# Patient Record
Sex: FEMALE | Race: WHITE
[De-identification: names, ages, dates, MRNs, and addresses within clinical notes are randomized per-mention and may not be internally consistent; named-entity substitution may affect disease eponyms.]

---

## 2017-07-15 ENCOUNTER — HOSPITAL ENCOUNTER (EMERGENCY)
Dept: HOSPITAL 11 - JP.ED | Age: 62
LOS: 1 days | Discharge: HOME | End: 2017-07-16
Payer: MEDICARE

## 2017-07-15 VITALS — SYSTOLIC BLOOD PRESSURE: 146 MMHG | DIASTOLIC BLOOD PRESSURE: 78 MMHG

## 2017-07-15 DIAGNOSIS — Z90.710: ICD-10-CM

## 2017-07-15 DIAGNOSIS — E66.9: ICD-10-CM

## 2017-07-15 DIAGNOSIS — F41.9: ICD-10-CM

## 2017-07-15 DIAGNOSIS — J44.9: ICD-10-CM

## 2017-07-15 DIAGNOSIS — F32.9: ICD-10-CM

## 2017-07-15 DIAGNOSIS — Z87.440: ICD-10-CM

## 2017-07-15 DIAGNOSIS — E03.9: ICD-10-CM

## 2017-07-15 DIAGNOSIS — Z88.8: ICD-10-CM

## 2017-07-15 DIAGNOSIS — R10.84: Primary | ICD-10-CM

## 2017-07-15 DIAGNOSIS — F17.210: ICD-10-CM

## 2017-07-15 DIAGNOSIS — Z88.1: ICD-10-CM

## 2017-07-15 DIAGNOSIS — Z90.721: ICD-10-CM

## 2017-07-15 DIAGNOSIS — Z79.899: ICD-10-CM

## 2017-07-15 DIAGNOSIS — Z90.49: ICD-10-CM

## 2017-07-15 DIAGNOSIS — Z98.890: ICD-10-CM

## 2017-07-15 DIAGNOSIS — Z88.5: ICD-10-CM

## 2017-07-16 NOTE — EDM.PDOC
ED HPI GENERAL MEDICAL PROBLEM





- General


Chief Complaint: Abdominal Pain


Stated Complaint: ABDOMINAL PAIN


Time Seen by Provider: 07/15/17 23:14


Source of Information: Reports: Patient, Family, RN Notes Reviewed


History Limitations: Reports: No Limitations





- History of Present Illness


INITIAL COMMENTS - FREE TEXT/NARRATIVE: 





61-year-old female presents emergency department day complaint of generalized 

abdominal pain, she does have a history of diverticular disease however the 

pain now has completely resolved she had no nausea vomiting or fever


  ** abdominal pain


Pain Score (Numeric/FACES): 3





- Related Data


 Allergies











Allergy/AdvReac Type Severity Reaction Status Date / Time


 


acetaminophen [From Percocet] Allergy  Hives Verified 07/15/17 23:49


 


ciprofloxacin [From Cipro] Allergy  Hives Verified 07/15/17 23:49


 


ciprofloxacin HCl Allergy  Hives Verified 07/15/17 23:49





[From Cipro]     


 


clonidine HCl [From Catapres] Allergy  Blisters Verified 07/15/17 23:49


 


hydromorphone Allergy  Itching Verified 07/15/17 23:49


 


oxycodone [From Percocet] Allergy  Hives Verified 07/15/17 23:49











Home Meds: 


 Home Meds





Citalopram [Citalopram HBr] 20 mg PO DAILY 01/29/15 [History]


Cyclobenzaprine [Flexeril] 10 - 20 mg PO TID PRN 01/29/15 [History]


Levothyroxine Sodium [Synthroid] 25 mcg PO DAILY 01/29/15 [History]


Pantoprazole [ProTONIX***] 40 mg PO BID 01/29/15 [History]


Albuterol Sulfate [Albuterol Sulfate HFA] 2 puff INH Q6H PRN 05/21/15 [History]


Ondansetron HCl [Zofran] 4 mg PO Q4H PRN 05/21/15 [History]


Tiotropium [Spiriva HandiHaler] 1 cap INH DAILY 16 [History]


ClonazePAM [KlonoPIN] 0.5 mg PO BID PRN #0 tablet 16 [Rx]


Loratadine 10 mg PO DAILY 16 [History]


Meclizine HCl 25 mg PO Q6HR PRN 16 [History]


traMADol [Ultram] 50 mg PO Q6HR PRN 16 [History]


buPROPion [Wellbutrin] 200 mg PO DAILY 09/10/16 [History]


Furosemide 40 mg PO DAILY PRN 17 [History]


Lidocaine 2% [Xylocaine 2% Jelly] 1 applic TOP ASDIRECTED 17 [History]


Mupirocin Oint [Bactroban Oint] 1 applic TP BID 17 [History]


Triamcinolone Acetonide [Kenalog 0.1% Crm] 1 applic TOP TID 17 [History]


Albuterol/Ipratropium [DuoNeb 3.0-0.5 MG/3 ML] 3 ml IH Q4H PRN 07/10/17 [History

]


predniSONE [Prednisone] 5 mg PO ASDIRECTED 07/10/17 [History]











Past Medical History


HEENT History: Reports: Allergic Rhinitis, Impaired Vision


Cardiovascular History: Reports: Cardiomyopathy, SOB on Exertion


Respiratory History: Reports: Bronchitis, Recurrent, COPD, Sleep Apnea


Gastrointestinal History: Reports: Bowel Obstruction, Diverticulosis, GERD, 

Hiatal Hernia


Genitourinary History: Reports: UTI, Recurrent, Other (See Below)


Other Genitourinary History: bladder suspension


OB/GYN History: Reports: Pregnancy, Spontaneous 


Musculoskeletal History: Reports: Arthritis, Back Pain, Chronic, Fibromyalgia


Neurological History: Reports: Vertigo


Psychiatric History: Reports: Anxiety, Depression


Endocrine/Metabolic History: Reports: Hypothyroidism, Obesity/BMI 30+


Oncologic (Cancer) History: Reports: Uterine


Other Oncologic History: complete hysterectomy 2015


Dermatologic History: Reports: Cellulitis


Other Dermatologic History: sore on right lower leg that won't go away





- Infectious Disease History


Infectious Disease History: Reports: Chicken Pox, Measles





- Past Surgical History


GI Surgical History: Reports: Appendectomy, Colonoscopy, EGD, Hernia Repair/

Other


Female  Surgical History: Reports: Hysterectomy, Salpingo-Oophorectomy





Social & Family History





- Family History


Family Medical History: Noncontributory





- Tobacco Use


Smoking Status *Q: Current Every Day Smoker


Years of Tobacco use: 40


Packs/Tins Daily: 0.5


Used Tobacco, but Quit: No


Month Tobacco Last Used: TODAY


Second Hand Smoke Exposure: No





- Caffeine Use


Caffeine Use: Reports: Coffee, Soda





- Alcohol Use


Days Per Week of Alcohol Use: 0





- Recreational Drug Use


Recreational Drug Use: No





ED ROS GENERAL





- Review of Systems


Review Of Systems: See Below


Constitutional: Reports: No Symptoms


Respiratory: Reports: No Symptoms


Cardiovascular: Reports: No Symptoms


GI/Abdominal: Reports: Abdominal Pain (Now resolved), Flatus.  Denies: Nausea, 

Vomiting ( headaches versus constipation)


: Reports: No Symptoms


Musculoskeletal: Reports: No Symptoms





ED EXAM, GI/ABD





- Physical Exam


Exam: See Below


Exam Limited By: No Limitations


General Appearance: Alert, WD/WN, No Apparent Distress


Respiratory/Chest: No Respiratory Distress


GI/Abdominal: Normal Bowel Sounds, Soft, Non-Tender, No Mass





Course





- Vital Signs


Last Recorded V/S: 





 Last Vital Signs











Temp  97.3 F   07/15/17 23:15


 


Pulse  83   07/15/17 23:15


 


Resp  16   07/15/17 23:15


 


BP  146/78 H  07/15/17 23:15


 


Pulse Ox  93 L  07/15/17 23:15














Departure





- Departure


Time of Disposition: 00:14


Disposition: Home, Self-Care 01


Condition: Good


Clinical Impression: 


Abdominal pain


Qualifiers:


 Abdominal location: generalized Qualified Code(s): R10.84 - Generalized 

abdominal pain








- Discharge Information


Forms:  ED Department Discharge


Additional Instructions: 


Continue regular medications,  Please followup with your primary care provider 

in 3-5 days if not better, please call return to the emergency department with 

worsening of symptoms.





- Assessment/Plan


Plan: 





Assessment





Acuity = acute





Site and laterality = abdominal pain now resolved





Etiology  = unclear etiology





Manifestations = none





Location of injury =  Home





Lab values = none





Plan


I offered to provide further workup and evaluation of abdominal pain she felt 

since the pain is now resolved she would prefer to just go home and will return 

if the she has a follow-up appointment with general surgery next month

















Patient was in agreement with the plan all questions were answered, they were 

instructed to return to the emergency department or call for worsening 

symptoms.  This note was dictated using dragon voice recognition software 

please call with any questions.

## 2017-10-03 ENCOUNTER — HOSPITAL ENCOUNTER (OUTPATIENT)
Dept: HOSPITAL 11 - JP.SDS | Age: 62
Discharge: HOME | End: 2017-10-03
Attending: SURGERY
Payer: MEDICARE

## 2017-10-03 VITALS — DIASTOLIC BLOOD PRESSURE: 72 MMHG | SYSTOLIC BLOOD PRESSURE: 107 MMHG

## 2017-10-03 DIAGNOSIS — F32.9: ICD-10-CM

## 2017-10-03 DIAGNOSIS — K44.9: ICD-10-CM

## 2017-10-03 DIAGNOSIS — F17.210: ICD-10-CM

## 2017-10-03 DIAGNOSIS — E03.9: ICD-10-CM

## 2017-10-03 DIAGNOSIS — K29.50: Primary | ICD-10-CM

## 2017-10-03 DIAGNOSIS — K21.9: ICD-10-CM

## 2017-10-03 DIAGNOSIS — J44.9: ICD-10-CM

## 2017-10-03 DIAGNOSIS — F41.9: ICD-10-CM

## 2017-10-03 DIAGNOSIS — G47.33: ICD-10-CM

## 2017-10-03 DIAGNOSIS — Z88.8: ICD-10-CM

## 2017-10-03 DIAGNOSIS — Z88.1: ICD-10-CM

## 2017-10-03 PROCEDURE — 43239 EGD BIOPSY SINGLE/MULTIPLE: CPT

## 2017-10-03 PROCEDURE — 88305 TISSUE EXAM BY PATHOLOGIST: CPT

## 2017-10-03 PROCEDURE — 88342 IMHCHEM/IMCYTCHM 1ST ANTB: CPT

## 2017-10-03 PROCEDURE — 88341 IMHCHEM/IMCYTCHM EA ADD ANTB: CPT

## 2017-10-03 PROCEDURE — 87081 CULTURE SCREEN ONLY: CPT

## 2017-10-10 NOTE — OR
DATE OF PROCEDURE:  10/03/2017

 

PREOPERATIVE DIAGNOSES:  Nausea and epigastric discomfort.

 

POSTOPERATIVE DIAGNOSES:  Nausea and epigastric discomfort associated with:

1. Small hiatal hernia with no gross inflammation at esophagogastric junction.

2. Diffuse gastritis (marked edema and mild redness throughout the gastric mucosa).

 

OPERATIVE PROCEDURE:  Esophagogastroduodenoscopy with:

1. Biopsies of antrum for CLOtest.

2. Biopsies of gastric body for histologic evaluation.

 

ANESTHESIA:  IV sedation.

 

INDICATIONS FOR PROCEDURE:  This is a 62-year-old presenting with ongoing problems with

nausea.  Nausea is predominant symptom, also has some mild epigastric discomfort as well.

Presently, she is on Protonix 40 mg b.i.d. and also taking Zofran on a p.r.n. basis.  The

plan is to proceed with an upper GI endoscopy with biopsies as indicated.  Potential risks

including bleeding and perforation were discussed, and the patient wishes to proceed.

 

DETAILS OF PROCEDURE:  The patient was taken to the operating room and placed in a left

lateral decubitus position.  IV sedation was administered, after which the upper GI

endoscope was passed orally through the length of the esophagus into the stomach with

retroflexion view of the fundus, and thereafter through the pyloric channel and into the

duodenum.

 

Findings included a normal hypopharynx, larynx, upper esophageal sphincter, there was a

small hiatal hernia present, but no gross inflammation or upward extension of the

gastroesophageal junction mucosal lining above the gastric fold.  Within the stomach, there

was a quite pronounced diffuse gastritis, this was most prominent within the body and

antrum.  The entire surface of the stomach was reddened and edematous.  No erosions or

ulcers were seen.  Pyloric channel and duodenum __________ third and fourth portions were

otherwise unremarkable.

 

The biopsies were obtained from the antrum and sent for CLOtest for H. pylori.  Multiple

biopsies were obtained from the gastric body, sent for histologic evaluation.  No bleeding

from the biopsy sites was seen and the procedure then concluded.

 

The plan will be to add a cytoprotective agent, in this case Carafate 1 gram q.i.d. to the

regimen and we will see her back in one month for recheck.  If the CLOtest is abnormal, we

will contact her regarding treatment of H. pylori.

 

 

 

 

Arian August MD

DD:  10/09/2017 09:07:38

DT:  10/10/2017 18:44:47

Job #:  [id number]

## 2018-04-16 ENCOUNTER — HOSPITAL ENCOUNTER (INPATIENT)
Dept: HOSPITAL 11 - JP.SDSSCHI | Age: 63
LOS: 2 days | Discharge: HOME | DRG: 351 | End: 2018-04-18
Attending: SURGERY | Admitting: SURGERY
Payer: MEDICARE

## 2018-04-16 DIAGNOSIS — K40.91: Primary | ICD-10-CM

## 2018-04-16 DIAGNOSIS — F17.210: ICD-10-CM

## 2018-04-16 DIAGNOSIS — N18.9: ICD-10-CM

## 2018-04-16 DIAGNOSIS — G57.82: ICD-10-CM

## 2018-04-16 DIAGNOSIS — K40.90: ICD-10-CM

## 2018-04-16 DIAGNOSIS — E78.00: ICD-10-CM

## 2018-04-16 DIAGNOSIS — G47.33: ICD-10-CM

## 2018-04-16 DIAGNOSIS — F60.9: ICD-10-CM

## 2018-04-16 DIAGNOSIS — G89.29: ICD-10-CM

## 2018-04-16 DIAGNOSIS — E55.9: ICD-10-CM

## 2018-04-16 DIAGNOSIS — J43.1: ICD-10-CM

## 2018-04-16 DIAGNOSIS — F32.9: ICD-10-CM

## 2018-04-16 DIAGNOSIS — F41.9: ICD-10-CM

## 2018-04-16 DIAGNOSIS — E03.9: ICD-10-CM

## 2018-04-16 DIAGNOSIS — I50.30: ICD-10-CM

## 2018-04-16 DIAGNOSIS — M54.9: ICD-10-CM

## 2018-04-16 DIAGNOSIS — K30: ICD-10-CM

## 2018-04-16 DIAGNOSIS — Z99.89: ICD-10-CM

## 2018-04-16 DIAGNOSIS — K43.0: ICD-10-CM

## 2018-04-16 PROCEDURE — C1781 MESH (IMPLANTABLE): HCPCS

## 2018-04-16 PROCEDURE — S0077 INJECTION, CLINDAMYCIN PHOSP: HCPCS

## 2018-04-16 PROCEDURE — 0YU60JZ SUPPLEMENT LEFT INGUINAL REGION WITH SYNTHETIC SUBSTITUTE, OPEN APPROACH: ICD-10-PCS | Performed by: SURGERY

## 2018-04-16 PROCEDURE — 01890ZZ DIVISION OF LUMBAR PLEXUS, OPEN APPROACH: ICD-10-PCS | Performed by: SURGERY

## 2018-04-16 PROCEDURE — 0WUF0JZ SUPPLEMENT ABDOMINAL WALL WITH SYNTHETIC SUBSTITUTE, OPEN APPROACH: ICD-10-PCS | Performed by: SURGERY

## 2018-04-16 RX ADMIN — BUPROPION HYDROCHLORIDE SCH MG: 150 TABLET, EXTENDED RELEASE ORAL at 21:50

## 2018-04-17 RX ADMIN — HYDROCODONE BITARTRATE AND ACETAMINOPHEN PRN TAB: 5; 325 TABLET ORAL at 08:58

## 2018-04-17 RX ADMIN — BUPROPION HYDROCHLORIDE SCH MG: 150 TABLET, EXTENDED RELEASE ORAL at 08:53

## 2018-04-17 RX ADMIN — HYDROCODONE BITARTRATE AND ACETAMINOPHEN PRN TAB: 5; 325 TABLET ORAL at 19:28

## 2018-04-17 RX ADMIN — BUPROPION HYDROCHLORIDE SCH MG: 150 TABLET, EXTENDED RELEASE ORAL at 21:04

## 2018-04-17 RX ADMIN — HYDROCODONE BITARTRATE AND ACETAMINOPHEN PRN TAB: 5; 325 TABLET ORAL at 15:05

## 2018-04-18 VITALS — DIASTOLIC BLOOD PRESSURE: 52 MMHG | SYSTOLIC BLOOD PRESSURE: 99 MMHG

## 2018-04-18 RX ADMIN — HYDROCODONE BITARTRATE AND ACETAMINOPHEN PRN TAB: 5; 325 TABLET ORAL at 08:20

## 2018-04-18 RX ADMIN — BUPROPION HYDROCHLORIDE SCH MG: 150 TABLET, EXTENDED RELEASE ORAL at 09:32

## 2018-04-19 NOTE — DISCH
ADMISSION DIAGNOSES:

1. Recurrent incarcerated incisional hernia.

2. Panlobular emphysema.

3. Chronic low back pain.

4. Obstructive sleep apnea syndrome with CPAP.

5. Mild concentric left ventricular hypertrophy.

6. Vitamin D deficiency.

7. Anxiety.

8. Gastrointestinal dysmotility.

9. Personality disorder.

10.Tobacco use disorder, recently stopped about a week ago.

11.Venous insufficiency.

12.Memory loss.

13.Major depression disorder.

14.Pure hypercholesterolemia.

15.Hypothyroidism due to acquired atrophy of the thyroid.

 

DISCHARGE DIAGNOSIS:  Open repair of recurrent incarcerated incisional hernia with mesh, and

repair of left inguinal hernia with mesh, and division of left inguinal nerve for recurrent

incarcerated incisional hernia and recurrent incarcerated left inguinal hernia and left

inguinal nerve entrapment.  Date of surgery on 04/16/2018 by Arian August MD.

 

HISTORY:    Bear Grey is a 62-year-old female with a recurrent incarcerated incisional

hernia and recurrent left inguinal hernia.  After preoperative evaluation and discussion of

possible risks and possible complications, she wished to proceed with surgical procedure.

 

HOSPITAL COURSE:  Bear had her surgery on 04/16/2018.  She had no operative complications.

On postop day #1, she was started on a regular diet, oral pain medication, and a stool

softener.  On postop day #2, her pain was well managed, her activity was good, vital signs

stable, and she was able to be discharged to home.

 

PHYSICAL EXAMINATION:

GENERAL:  Bear Grey is a 62-year-old female, alert, orientated.

VITAL SIGNS:  Height is 5 feet 1 inch, weight is 199 pounds.  TPR is 99.2, 61, 16, blood

pressure 99/52.

HEENT:  Negative.

NECK:  Supple.

HEART:  Regular rate and rhythm.

LUNGS:  Clear.

ABDOMEN:  Soft.  Normal expected tenderness on left surgical site.

EXTREMITIES:  Negative extremities without peripheral edema.

 

DISPOSITION:  Discharged to home.

 

CONDITION:  Stable and improving.

 

FOLLOWUP APPOINTMENT:  With Mariel Lugo PA-C, on 04/30/2018 at 9:00 a.m. and follow up

with Arian August MD on 05/09/2018 at 10:00 a.m.

 

DISCHARGE MEDICATIONS:  New prescriptions:

1. Norco 5/325 mg 1 to 2 every 4 hours p.r.n. pain, #40, new.

She is to resume her home medications:

1. Albuterol inhaler 2 puffs every 6 hours p.r.n. dyspnea.

2. Albuterol DuoNeb 3 mL every 4 hours p.r.n. shortness of breath.

3. Keflex 500 mg oral twice daily.

4. Klonopin 0.5 mg oral daily.

5. Furosemide 40 mg oral daily.

6. Levothyroxine 25 mcg oral daily.

7. Lidocaine 2% jelly one applicator topical as directed.

8. Loratadine 10 mg oral daily.

9. Magnesium oxide 500 mg oral daily.

10.Meclizine 25 mg oral q.6 hours p.r.n. dizziness.

11.Elocon 0.1% cream 1 applicator topical daily.

12.Zofran 4 mg every 4 hours p.r.n. nausea.

13.Protonix 40 mg oral twice daily.

14.Denavir 1% cream 1.5 g topical as directed.

15.Sertraline (Zoloft) 100 mg oral daily.

16.Carafate 1 tablet oral before meals and at bedtime.

17.Kenalog 0.1% cream topical t.i.d.

18.Chantix 1 tablet as directed b.i.d.

19.Bupropion 150 mg oral twice daily.

20.Lamotrigine 150 mg oral at bedtime.

21.Tizanidine 4 mg oral every 8 hours p.r.n. muscle spasms.

22.Trazodone 1 to 2 at bedtime for sleep.

 

DIET:  Usual diet as tolerated, drink 8-10 glasses of water a day.

 

ACTIVITY:  No lifting greater than 10 pounds for 6 weeks.

 

DRIVING AFTER DISCHARGE:  Do not drive on pain medication, may shower.

 

INSTRUCTIONS:  Notify provider if any fever, increased pain, nausea, or vomiting.  Keep site

clean and dry.

 

SPECIAL INSTRUCTION:  Use incentive spirometer 10 times every hour while awake.

## 2018-04-22 NOTE — OR
DATE OF PROCEDURE:  04/16/2018

 

PREOPERATIVE DIAGNOSIS:  Left inguinal hernia.

 

POSTOPERATIVE DIAGNOSES:

1. Recurrent incarcerated incisional hernia.

2. Non-incarcerated left inguinal hernia.

3. Left ilioinguinal nerve at risk for scar entrapment.

 

OPERATIVE PROCEDURES:

1. Open repair of recurrent incarcerated incisional hernia with mesh (61607, 49444).

2. Repair of left inguinal hernia with mesh (48084).

3. Division of left ilioinguinal nerve (77900).

 

ANESTHESIA:  General.

 

ASSISTANTS:

1. Mariel Lugo PA-C.

2. JUS Sierra.

3. JUS Kelsey.

 

INDICATION FOR PROCEDURE:  A 62-year-old presenting with what appears to be increasingly

symptomatic left inguinal hernia.  Plan is to proceed with an inguinal exploration with

repair of hernias as indicated.  Potential risks including bleeding, infection, injury to

underlying viscera, possible problems with the hernia recurring, problems of chronic pain

following the repair were all reviewed along with the remote possibility of cardiopulmonary,

septic, or hemorrhagic complications leading to death, and the patient wishes to proceed.

 

DETAILS OF PROCEDURE:  The patient was taken to the operating room and after general

endotracheal anesthesia was induced, the abdomen and groin areas were prepped and draped.

The area over the left inguinal region was then incised with incision then carried down

through the skin and subcutaneous tissue and down on to the area of the external oblique

aponeurosis.  This area was initially divided.  The patient had a complex hernia picture in

this case.  She had a previous incisional hernia located just above that, but that had been

repaired with mesh.  This appeared to have recurred along with some mesh prolapse in an

incarcerated manner into the abdominal wall just below the previous hernia repair site.

This was associated with the onset of a broad-based direct inguinal hernia as well.  At this

point, the incisional hernia component was dissected free from the surrounding soft tissues

and then reduced.  The previously-placed mesh was then affixed superior to that with some 0

Vicryl stitches between the musculature consisting of the 3 layers of the abdominal muscle

at that level.  This then involved division of the transversalis fascia and dissection in

the plane underneath the transversalis fascia down to the level of the Dell ligament as

well as immediately superiorly up to the level of the adjacent incisional hernia and then

laterally underneath the conjoined tendon.  An extra-large mesh plug was then placed into

that defect.  This was affixed initially to the Dell ligament with titanium tacking

screws, then medially to the underside of the conjoined tendon, superiorly to the conjoint

tendon, and also to the mesh at the repair site of the incisional hernia, all with 0

Ethibond sutures, and then finally with horizontal mattress sutures to the lateral aspect of

the tissue underlying the conjoined tendon.  At that point, there appeared to be a

reasonably secure closure of the floor as well as the above incisional hernia overlying

this.  The conjoined tendon was affixed to the inguinal ligament with a running #1 Vicryl

stitch.

 

The patient's ilioinguinal nerve was within the field of all of this dissection and it was

felt to be at significant risk for entrapment plus scar resulting in chronic pain.  Given

this, this was divided at the lateral aspect of the incision and specimen of this was

delivered from the field.

 

Finally, the external oblique aponeurosis was approximated over this area with 0 Vicryl

stitch, subcutaneous tissue with 4-0 Vicryl stitch, and the skin with staples.  Dressing was

applied.  The patient was taken to the recovery room in a satisfactory condition.  There

were no other complications.

 

Physician assistant, Mariel Lugo PA-C, played an essential role in assisting in this case,

helping to position the patient, retract structures as needed, as well as suturing and

cutting sutures when indicated.  Her presence improved patient safety and decreased the

operative time.

 

 

 

 

Arian August MD

DD:  04/21/2018 18:27:31

DT:  04/22/2018 12:57:37

Job #:  [id number]

## 2018-04-22 NOTE — PN
DATE OF SERVICE:  04/17/2018

 

The patient has been afebrile with stable vital signs.  The plan will be to start diet today

and go over to oral pain medication, and she may be ready for discharge home tomorrow.

 

 

 

 

Arian August MD

DD:  04/17/2018 06:38:23

DT:  04/22/2018 09:18:30

Job #:  [id number]

## 2018-07-03 ENCOUNTER — HOSPITAL ENCOUNTER (INPATIENT)
Dept: HOSPITAL 11 - JP.ED | Age: 63
LOS: 3 days | Discharge: HOME | DRG: 392 | End: 2018-07-06
Attending: INTERNAL MEDICINE | Admitting: INTERNAL MEDICINE
Payer: MEDICARE

## 2018-07-03 DIAGNOSIS — G89.29: ICD-10-CM

## 2018-07-03 DIAGNOSIS — K91.873: ICD-10-CM

## 2018-07-03 DIAGNOSIS — Z88.1: ICD-10-CM

## 2018-07-03 DIAGNOSIS — Z87.440: ICD-10-CM

## 2018-07-03 DIAGNOSIS — F32.9: ICD-10-CM

## 2018-07-03 DIAGNOSIS — E03.9: ICD-10-CM

## 2018-07-03 DIAGNOSIS — F41.9: ICD-10-CM

## 2018-07-03 DIAGNOSIS — K21.9: ICD-10-CM

## 2018-07-03 DIAGNOSIS — F17.210: ICD-10-CM

## 2018-07-03 DIAGNOSIS — H54.7: ICD-10-CM

## 2018-07-03 DIAGNOSIS — K57.32: Primary | ICD-10-CM

## 2018-07-03 DIAGNOSIS — Z85.42: ICD-10-CM

## 2018-07-03 DIAGNOSIS — R10.9: ICD-10-CM

## 2018-07-03 DIAGNOSIS — Z88.5: ICD-10-CM

## 2018-07-03 DIAGNOSIS — K57.30: ICD-10-CM

## 2018-07-03 DIAGNOSIS — M54.9: ICD-10-CM

## 2018-07-03 DIAGNOSIS — M79.7: ICD-10-CM

## 2018-07-03 DIAGNOSIS — J43.1: ICD-10-CM

## 2018-07-03 DIAGNOSIS — Z88.8: ICD-10-CM

## 2018-07-03 DIAGNOSIS — R07.9: ICD-10-CM

## 2018-07-03 DIAGNOSIS — J43.9: ICD-10-CM

## 2018-07-03 PROCEDURE — 36415 COLL VENOUS BLD VENIPUNCTURE: CPT

## 2018-07-03 PROCEDURE — C9113 INJ PANTOPRAZOLE SODIUM, VIA: HCPCS

## 2018-07-03 PROCEDURE — 96374 THER/PROPH/DIAG INJ IV PUSH: CPT

## 2018-07-03 PROCEDURE — 74019 RADEX ABDOMEN 2 VIEWS: CPT

## 2018-07-03 PROCEDURE — 74177 CT ABD & PELVIS W/CONTRAST: CPT

## 2018-07-03 PROCEDURE — 85025 COMPLETE CBC W/AUTO DIFF WBC: CPT

## 2018-07-03 PROCEDURE — 81001 URINALYSIS AUTO W/SCOPE: CPT

## 2018-07-03 PROCEDURE — 82150 ASSAY OF AMYLASE: CPT

## 2018-07-03 PROCEDURE — 96376 TX/PRO/DX INJ SAME DRUG ADON: CPT

## 2018-07-03 PROCEDURE — 99284 EMERGENCY DEPT VISIT MOD MDM: CPT

## 2018-07-03 PROCEDURE — 83690 ASSAY OF LIPASE: CPT

## 2018-07-03 PROCEDURE — 99285 EMERGENCY DEPT VISIT HI MDM: CPT

## 2018-07-03 PROCEDURE — 80053 COMPREHEN METABOLIC PANEL: CPT

## 2018-07-03 PROCEDURE — 96361 HYDRATE IV INFUSION ADD-ON: CPT

## 2018-07-03 RX ADMIN — HYDROCODONE BITARTRATE AND ACETAMINOPHEN PRN TAB: 5; 325 TABLET ORAL at 22:31

## 2018-07-03 RX ADMIN — IOPAMIDOL SCH ML: 612 INJECTION, SOLUTION INTRAVENOUS at 17:44

## 2018-07-03 RX ADMIN — TRIAMCINOLONE ACETONIDE SCH: 1 CREAM TOPICAL at 22:40

## 2018-07-03 RX ADMIN — IOPAMIDOL SCH: 612 INJECTION, SOLUTION INTRAVENOUS at 21:57

## 2018-07-03 RX ADMIN — METRONIDAZOLE SCH MLS/HR: 500 SOLUTION INTRAVENOUS at 22:24

## 2018-07-03 RX ADMIN — IOPAMIDOL SCH ML: 612 INJECTION, SOLUTION INTRAVENOUS at 17:43

## 2018-07-04 RX ADMIN — TAZOBACTAM SODIUM AND PIPERACILLIN SODIUM SCH MLS/HR: 375; 3 INJECTION, SOLUTION INTRAVENOUS at 16:02

## 2018-07-04 RX ADMIN — TAZOBACTAM SODIUM AND PIPERACILLIN SODIUM SCH MLS/HR: 375; 3 INJECTION, SOLUTION INTRAVENOUS at 21:17

## 2018-07-04 RX ADMIN — METRONIDAZOLE SCH MLS/HR: 500 SOLUTION INTRAVENOUS at 04:08

## 2018-07-04 RX ADMIN — TAZOBACTAM SODIUM AND PIPERACILLIN SODIUM SCH MLS/HR: 375; 3 INJECTION, SOLUTION INTRAVENOUS at 09:53

## 2018-07-04 RX ADMIN — DEXTROSE SCH MG: 50 INJECTION, SOLUTION INTRAVENOUS at 21:13

## 2018-07-04 RX ADMIN — TRIAMCINOLONE ACETONIDE SCH APPLIC: 1 CREAM TOPICAL at 08:32

## 2018-07-04 RX ADMIN — HYDROCODONE BITARTRATE AND ACETAMINOPHEN PRN TAB: 5; 325 TABLET ORAL at 14:43

## 2018-07-04 RX ADMIN — HYDROCODONE BITARTRATE AND ACETAMINOPHEN PRN TAB: 5; 325 TABLET ORAL at 08:44

## 2018-07-04 RX ADMIN — HYDROCODONE BITARTRATE AND ACETAMINOPHEN PRN TAB: 5; 325 TABLET ORAL at 04:29

## 2018-07-04 RX ADMIN — TRIAMCINOLONE ACETONIDE SCH APPLIC: 1 CREAM TOPICAL at 14:44

## 2018-07-04 RX ADMIN — HYDROCODONE BITARTRATE AND ACETAMINOPHEN PRN TAB: 5; 325 TABLET ORAL at 21:11

## 2018-07-04 RX ADMIN — TRIAMCINOLONE ACETONIDE SCH APPLIC: 1 CREAM TOPICAL at 21:04

## 2018-07-04 NOTE — PCM.PN
- General Info


Date of Service: 07/04/18


Functional Status: Reports: Pain Controlled





- Review of Systems


General: Denies: Fever


Gastrointestinal: Reports: Abdominal Pain


Systems Review Comment:: 





There were no acute events overnight. Abdominal pain has improved since 

admission. She still reports moderate lower abdominal and left lower quadrant 

abdominal pain. She has had a bowel movement. She has not had any fevers. White 

blood cell count is normal. No nausea. Appetite is returning.





- Patient Data


Vitals - Most Recent: 


 Last Vital Signs











Temp  35.7 C   07/04/18 08:21


 


Pulse  73   07/04/18 08:21


 


Resp  16   07/04/18 08:21


 


BP  97/54 L  07/04/18 08:21


 


Pulse Ox  90 L  07/04/18 08:21











Weight - Most Recent: 89.811 kg


I&O - Last 24 Hours: 


 Intake & Output











 07/03/18 07/04/18 07/04/18





 22:59 06:59 14:59


 


Intake Total  1001 140


 


Output Total  1150 300


 


Balance  -149 -160











Lab Results Last 24 Hours: 


 Laboratory Results - last 24 hr











  07/03/18 07/03/18 07/03/18 Range/Units





  15:11 15:11 15:11 


 


WBC   9.9   (4.5-11.0)  K/uL


 


RBC   4.44   (3.30-5.50)  M/uL


 


Hgb   13.1   (12.0-15.0)  g/dL


 


Hct   40.5   (36.0-48.0)  %


 


MCV   91   (80-98)  fL


 


MCH   30   (27-31)  pg


 


MCHC   32   (32-36)  %


 


Plt Count   253   (150-400)  K/uL


 


Neut % (Auto)   75 H   (36-66)  %


 


Lymph % (Auto)   18 L   (24-44)  %


 


Mono % (Auto)   6   (2-6)  %


 


Eos % (Auto)   1 L   (2-4)  %


 


Baso % (Auto)   0   (0-1)  %


 


Sodium    143  (140-148)  mmol/L


 


Potassium    3.8  (3.6-5.2)  mmol/L


 


Chloride    104  (100-108)  mmol/L


 


Carbon Dioxide    31  (21-32)  mmol/L


 


Anion Gap    8.3  (5.0-14.0)  mmol/L


 


BUN    14  (7-18)  mg/dL


 


Creatinine    1.0  D  (0.6-1.0)  mg/dL


 


Est Cr Clr Drug Dosing    44.58  mL/min


 


Estimated GFR (MDRD)    56 L  (>60)  


 


Glucose    101  ()  mg/dL


 


Calcium    8.9  (8.5-10.1)  mg/dL


 


Total Bilirubin    0.4  (0.2-1.0)  mg/dL


 


AST    17  (15-37)  U/L


 


ALT    22  (12-78)  U/L


 


Alkaline Phosphatase    98  ()  U/L


 


Total Protein    7.2  (6.4-8.2)  g/dL


 


Albumin    3.3 L  (3.4-5.0)  g/dL


 


Globulin    3.9 H  (2.3-3.5)  g/dL


 


Albumin/Globulin Ratio    0.9 L  (1.2-2.2)  


 


Amylase    52  ()  U/L


 


Lipase    71 L  ()  U/L


 


Urine Color  Yellow    


 


Urine Appearance  Clear    


 


Urine pH  7.0    (4.5-8.0)  


 


Ur Specific Gravity  1.010    (1.008-1.030)  


 


Urine Protein  Negative    (NEGATIVE)  mg/dL


 


Urine Glucose (UA)  Normal    (NEGATIVE)  mg/dL


 


Urine Ketones  Negative    (NEGATIVE)  mg/dL


 


Urine Occult Blood  Negative    (NEGATIVE)  


 


Urine Nitrite  Negative    (NEGATIVE)  


 


Urine Bilirubin  Negative    (NEGATIVE)  


 


Urine Urobilinogen  Normal    (NORMAL)  mg/dL


 


Ur Leukocyte Esterase  Negative    (NEGATIVE)  


 


Urine RBC  0-5    (0-5)  


 


Urine WBC  0-5    (0-5)  


 


Ur Epithelial Cells  Rare    


 


Amorphous Sediment  Not seen    


 


Urine Bacteria  Moderate    


 


Urine Mucus  Not seen    














  07/04/18 07/04/18 Range/Units





  05:45 05:45 


 


WBC  6.8   (4.5-11.0)  K/uL


 


RBC  4.11   (3.30-5.50)  M/uL


 


Hgb  12.4   (12.0-15.0)  g/dL


 


Hct  37.9   (36.0-48.0)  %


 


MCV  92   (80-98)  fL


 


MCH  30   (27-31)  pg


 


MCHC  33   (32-36)  %


 


Plt Count  212   (150-400)  K/uL


 


Neut % (Auto)  92 H   (36-66)  %


 


Lymph % (Auto)  7 L   (24-44)  %


 


Mono % (Auto)  1 L   (2-6)  %


 


Eos % (Auto)  0 L   (2-4)  %


 


Baso % (Auto)  0   (0-1)  %


 


Sodium   143  (140-148)  mmol/L


 


Potassium   4.1  (3.6-5.2)  mmol/L


 


Chloride   106  (100-108)  mmol/L


 


Carbon Dioxide   26  (21-32)  mmol/L


 


Anion Gap   10.7  (5.0-14.0)  mmol/L


 


BUN   11  (7-18)  mg/dL


 


Creatinine   1.0  (0.6-1.0)  mg/dL


 


Est Cr Clr Drug Dosing   44.58  mL/min


 


Estimated GFR (MDRD)   56 L  (>60)  


 


Glucose   154 H  ()  mg/dL


 


Calcium   8.4 L  (8.5-10.1)  mg/dL


 


Total Bilirubin    (0.2-1.0)  mg/dL


 


AST    (15-37)  U/L


 


ALT    (12-78)  U/L


 


Alkaline Phosphatase    ()  U/L


 


Total Protein    (6.4-8.2)  g/dL


 


Albumin    (3.4-5.0)  g/dL


 


Globulin    (2.3-3.5)  g/dL


 


Albumin/Globulin Ratio    (1.2-2.2)  


 


Amylase    ()  U/L


 


Lipase    ()  U/L


 


Urine Color    


 


Urine Appearance    


 


Urine pH    (4.5-8.0)  


 


Ur Specific Gravity    (1.008-1.030)  


 


Urine Protein    (NEGATIVE)  mg/dL


 


Urine Glucose (UA)    (NEGATIVE)  mg/dL


 


Urine Ketones    (NEGATIVE)  mg/dL


 


Urine Occult Blood    (NEGATIVE)  


 


Urine Nitrite    (NEGATIVE)  


 


Urine Bilirubin    (NEGATIVE)  


 


Urine Urobilinogen    (NORMAL)  mg/dL


 


Ur Leukocyte Esterase    (NEGATIVE)  


 


Urine RBC    (0-5)  


 


Urine WBC    (0-5)  


 


Ur Epithelial Cells    


 


Amorphous Sediment    


 


Urine Bacteria    


 


Urine Mucus    











Med Orders - Current: 


 Current Medications





Acetaminophen (Tylenol)  650 mg PO Q4H PRN


   PRN Reason: Pain (Mild 1-3)/fever


Hydrocodone Bitart/Acetaminophen (Norco 325-5 Mg)  1 tab PO Q4H PRN


   PRN Reason: Pain (moderate 4-6)


   Last Admin: 07/04/18 08:44 Dose:  1 tab


Albuterol (Proventil Neb Soln)  2.5 mg NEB Q4H PRN


   PRN Reason: Shortness Of Breath/wheezing


Albuterol (Ventolin Hfa)  0 gm INH Q6H PRN


   PRN Reason: Dyspnea


Albuterol/Ipratropium (Duoneb 3.0-0.5 Mg/3 Ml)  3 ml NEB Q4H PRN


   PRN Reason: Shortness of Breath


Albuterol/Ipratropium (Duoneb 3.0-0.5 Mg/3 Ml)  3 ml NEB QIDRT [facility]


Bisacodyl (Dulcolax)  5 mg PO DAILY PRN


   PRN Reason: Constipation


   Last Admin: 07/03/18 22:31 Dose:  5 mg


Bupropion HCl (Wellbutrin)  150 mg PO BID [facility]


   Last Admin: 07/04/18 08:31 Dose:  150 mg


Clonazepam (Klonopin)  0.5 mg PO DAILY [facility]


   Last Admin: 07/04/18 10:01 Dose:  0.5 mg


Docusate Sodium (Colace)  100 mg PO BID PRN


   PRN Reason: Constipation


   Last Admin: 07/03/18 22:31 Dose:  100 mg


Furosemide (Lasix)  40 mg PO DAILY PRN


   PRN Reason: Other


   Last Admin: 07/03/18 22:32 Dose:  40 mg


Piperacillin/Tazobactam/ (Dextrose 3.375 gm/ Premix)  50 mls @ 100 mls/hr IV 

Q6H [facility]


   Last Admin: 07/04/18 09:53 Dose:  100 mls/hr


Dextrose/Lactated Ringer's (Dextrose 5%-Lactated Ringers)  1,000 mls @ 100 mls/

hr IV ASDIRECTED [facility]


Lamotrigine (Lamotrigine)  150 mg PO BEDTIME [facility]


   Last Admin: 07/03/18 22:32 Dose:  150 mg


Levothyroxine Sodium (Levothyroxine)  25 mcg PO ACBREAKFAST [facility]


   Last Admin: 07/04/18 08:30 Dose:  25 mcg


Loratadine (Claritin)  10 mg PO DAILY [facility]


   Last Admin: 07/04/18 08:31 Dose:  10 mg


Lorazepam (Ativan)  1 mg IV Q6H PRN


   PRN Reason: Nausea/Vomiting


Morphine Sulfate (Morphine)  2 mg IVPUSH Q2H PRN


   PRN Reason: Pain (severe 7-10)


Nicotine (Habitrol)  14 mg TRDERM DAILY [facility]


   Last Admin: 07/04/18 08:31 Dose:  Not Given


Ondansetron HCl (Zofran Odt)  4 mg PO Q6H PRN


   PRN Reason: Nausea able to take PO


Ondansetron HCl (Zofran)  4 mg IV Q4H PRN


   PRN Reason: Nausea/Vomiting


Pantoprazole Sodium (Protonix Iv***)  40 mg IV DAILY [facility]


   Last Admin: 07/04/18 08:32 Dose:  40 mg


Sertraline HCl (Zoloft)  50 mg PO DAILY [facility]


   Last Admin: 07/04/18 08:31 Dose:  50 mg


Sucralfate (Carafate)  1 gm PO QIDACANDBED [facility]


   Last Admin: 07/04/18 08:31 Dose:  1 gm


Tizanidine HCl (Zanaflex)  4 mg PO Q8H PRN


   PRN Reason: Muscle Spasm


Trazodone HCl (Trazodone)  50 mg PO BEDTIME PRN


   PRN Reason: Sleep


   Last Admin: 07/03/18 22:31 Dose:  50 mg


Triamcinolone Acetonide (Triamcinolone Acetonide 0.1% Crm)  0 gm TOP TID [facility]


   Last Admin: 07/04/18 08:32 Dose:  1 applic





Discontinued Medications





Albuterol/Ipratropium (Duoneb 3.0-0.5 Mg/3 Ml)  3 ml NEB QID [facility]


   Last Admin: 07/04/18 05:28 Dose:  3 ml


Bupropion HCl (Wellbutrin)  150 mg PO BID [facility]


   Last Admin: 07/03/18 22:45 Dose:  150 mg


Sodium Chloride (Normal Saline)  1,000 mls @ 75 mls/hr IV ASDIRECTED [facility]


   Last Admin: 07/03/18 15:57 Dose:  75 mls/hr


Sodium Chloride (Normal Saline)  80 mls @ 3 mls/sec IV ASDIRECTED [facility]


   Stop: 07/03/18 23:00


   Last Admin: 07/03/18 17:44 Dose:  3 mls/sec


Sodium Chloride (Normal Saline)  1,000 mls @ 75 mls/hr IV ASDIRECTED [facility]


   Last Admin: 07/03/18 21:00 Dose:  75 mls/hr


Metronidazole 500 mg/ Premix  100 mls @ 100 mls/hr IV Q6H [facility]


   Last Admin: 07/04/18 04:08 Dose:  100 mls/hr


Piperacillin Sod/Tazobactam (Sod 3.375 gm/ Sodium Chloride)  50 mls @ 100 mls/

hr IV Q6H [facility]


   Last Admin: 07/04/18 04:08 Dose:  100 mls/hr


Sodium Chloride (Normal Saline)  1,000 mls @ 125 mls/hr IV ASDIRECTED [facility]


   Last Admin: 07/04/18 08:21 Dose:  125 mls/hr


Metronidazole 500 mg/ Premix  100 mls @ 100 mls/hr IV Q6H [facility]


Iopamidol (Isovue-300 (61%))  135 ml IV .AS DIRECTED [facility]


   Stop: 07/03/18 22:00


   Last Admin: 07/03/18 21:57 Dose:  Not Given


Levothyroxine Sodium (Levothyroxine)  25 mcg PO DAILY [facility]


Methylprednisolone Sodium Succinate (Solu-Medrol)  125 mg IVPUSH ONETIME ONE


   Stop: 07/03/18 21:28


   Last Admin: 07/03/18 22:35 Dose:  125 mg


Methylprednisolone Sodium Succinate (Solu-Medrol)  62.5 mg IVPUSH Q6H [facility]


   Last Admin: 07/04/18 04:08 Dose:  62.5 mg


Methylprednisolone Sodium Succinate (Solu-Medrol)  62.5 mg IVPUSH Q6H [facility]


   Last Admin: 07/04/18 09:51 Dose:  62.5 mg


Morphine Sulfate (Morphine)  5 mg IVPUSH ONETIME ONE


   Stop: 07/03/18 15:19


   Last Admin: 07/03/18 15:56 Dose:  5 mg


Morphine Sulfate (Morphine)  2 mg IVPUSH ONETIME ONE


   Stop: 07/03/18 17:13


   Last Admin: 07/03/18 17:30 Dose:  2 mg


Sodium Chloride (Saline Flush)  10 ml FLUSH ASDIRECTED PRN


   PRN Reason: Keep Vein Open


   Last Admin: 07/03/18 15:57 Dose:  10 ml


Sodium Chloride (Saline Flush)  10 ml FLUSH ONETIME ONE


   Stop: 07/03/18 17:21


   Last Admin: 07/03/18 17:40 Dose:  10 ml











- Exam


Quality Assessment: No: Supplemental Oxygen


General: Alert, Oriented, Cooperative, No Acute Distress


Neck: Supple


Lungs: Clear to Auscultation, Normal Respiratory Effort


GI/Abdominal Exam: Soft, No Distention, Tender, Abnormal Bowel Sounds (

hypoactive)


Extremities: No Pedal Edema


Skin: Warm, Dry


Psy/Mental Status: Alert, Normal Affect





- Problem List Review


Problem List Initiated/Reviewed/Updated: Yes





- My Orders


Last 24 Hours: 


My Active Orders





07/04/18 22:00


methylPREDNISolone Sod Succ [Solu-MEDROL]   62.5 mg IVPUSH Q12H 





07/04/18 Lunch


Clear Liquid Diet [DIET] 














- Plan


Plan:: 








ASSESSMENT / PLAN





Acute diverticulitis of the mid sigmoid colon - pain improving. Not having any 

fevers.Indication for surgical intervention at this time.


-IV fluids


-Antibiotic coverage with Piperacillin/tazobactam 3.375mg  IV every 6 hours


-Tapering dose of steroids


-Pain control


-Trial of clear liquids





COPD - stable with no evidence for exacerbation.


 -albuterol nebulizer every 4 hours as needed for wheezing and cough


-Duo nebu ; schedule  nebulize every 6 hours     


-oxygen per nasal cannula to keep sats greater than 92%              


-Advise to notify nurses of any chest pain or other symptoms 





Tobacco dependence - patient currently utilizing a nicotine patch.





Maintenance issues


-Nutrition: Clear liquids


-Pablo catheter - not indicated at this time


-DVT: SCD


-PPI: PPI





Disposition - I would anticipate discharge to home after the hospital stay, 

possibly tomorrow if she continues to improve at this rate





Phil Nelson M.D.

## 2018-07-05 RX ADMIN — TAZOBACTAM SODIUM AND PIPERACILLIN SODIUM SCH MLS/HR: 375; 3 INJECTION, SOLUTION INTRAVENOUS at 15:59

## 2018-07-05 RX ADMIN — TAZOBACTAM SODIUM AND PIPERACILLIN SODIUM SCH MLS/HR: 375; 3 INJECTION, SOLUTION INTRAVENOUS at 09:18

## 2018-07-05 RX ADMIN — TRIAMCINOLONE ACETONIDE SCH: 1 CREAM TOPICAL at 09:32

## 2018-07-05 RX ADMIN — TRIAMCINOLONE ACETONIDE SCH: 1 CREAM TOPICAL at 21:56

## 2018-07-05 RX ADMIN — TRIAMCINOLONE ACETONIDE SCH: 1 CREAM TOPICAL at 15:12

## 2018-07-05 RX ADMIN — TAZOBACTAM SODIUM AND PIPERACILLIN SODIUM SCH MLS/HR: 375; 3 INJECTION, SOLUTION INTRAVENOUS at 03:45

## 2018-07-05 RX ADMIN — DEXTROSE SCH MG: 50 INJECTION, SOLUTION INTRAVENOUS at 10:31

## 2018-07-05 RX ADMIN — TAZOBACTAM SODIUM AND PIPERACILLIN SODIUM SCH MLS/HR: 375; 3 INJECTION, SOLUTION INTRAVENOUS at 21:50

## 2018-07-05 RX ADMIN — HYDROCODONE BITARTRATE AND ACETAMINOPHEN PRN TAB: 5; 325 TABLET ORAL at 16:51

## 2018-07-05 NOTE — CR
Abdomen 2V AP Flat Upright

 

INDICATION:  abdominal pain

 

COMPARISON:   CT abdomen 4/12/2018

 

FINDINGS:  4 views.

There is infiltrate and possible pleural effusion at the left base.

Postoperative changes in the abdomen. No abnormal bowel gas pattern seen. No free air. No signs of ob
struction. No abnormal calculi seen.

## 2018-07-05 NOTE — PN
DATE OF SERVICE:  07/04/2018

 

The patient was admitted overnight with a sigmoid colon diverticulitis.  She states that her

abdomen feels a little bit less uncomfortable today.  Examination does show quite a bit in

the way of tenderness in the left lower quadrant.  A CT scan shows some fluid around the

area of diverticulitis, but no obvious abscess formation.  She is presently receiving

antibiotics consisting of Flagyl and Zosyn.  The plan, at this point, we would attempt to

treat this nonoperatively.  We will continue the antibiotics and minimize the amount of oral

intake, recheck some labs in the morning, and follow things clinically over the next few

days.

 

 

 

 

Arian August MD

DD:  07/04/2018 09:18:13

DT:  07/05/2018 08:15:47

Job #:  [id number]

## 2018-07-05 NOTE — PN
DATE OF SERVICE:  07/05/2018

 

SUBJECTIVE:  Bear has sigmoid diverticulitis.  She is on IV Zosyn and clear liquids.  BNP

was 2610.  She has been active.  Pain has been controlled.  Denies any other associated

signs and symptoms.

 

REVIEW OF SYSTEMS:  HEENT:  Negative.

NECK:  Negative.

HEART:  No chest pain or shortness of breath.

LUNGS:  No cough.

ABDOMEN:  As above.  Last bowel movement was a couple of days ago.

:  Negative for any UTI signs and symptoms.

EXTREMITIES:  Without joint pain or swelling.

SKIN:  Denies any rash or changes in moles.

PSYCHIATRIC:  No depression, anxiety, or insomnia.

 

Remainder of review of systems negative for any pertinent positives and negatives.

 

OBJECTIVE:  GENERAL:  Bear Grey is a 62-year-old female.  She is alert and orientated.

VITAL SIGNS:  TPR 96.3, 73, 18, and blood pressure 132/77.

HEENT:  Negative.

NECK:  Supple.

HEART:  Regular rate and rhythm.

LUNGS:  Clear.

ABDOMEN:  Soft and nontender.

EXTREMITIES:  Without peripheral edema.

NEUROLOGIC:  Intact.

PSYCHIATRIC:  Mood and affect appropriate.

 

ASSESSMENT:  Sigmoid diverticulitis.

 

PLAN:

1. Full liquid diet.

2. We will evaluate p.r.n. or in a.m.

 

 

 

 

Mariel Lugo PA-C

DD:  07/05/2018 08:30:42

DT:  07/05/2018 09:44:27

Job #:  [id number]

## 2018-07-05 NOTE — PCM.PN
- General Info


Date of Service: 07/05/18


Functional Status: Reports: Pain Controlled, Tolerating Diet





- Review of Systems


General: Denies: Fever


Gastrointestinal: Reports: Abdominal Pain


Systems Review Comment:: 





No acute events overnight. Abdominal pain has been slowly improving but has not 

resolved. She has been having bowel movements. She has not been having fevers. 

Shortness of breath is at baseline. Tolerating her clear liquid diet overnight.





- Patient Data


Vitals - Most Recent: 


 Last Vital Signs











Temp  36.8 C   07/05/18 10:54


 


Pulse  72   07/05/18 11:46


 


Resp  18   07/05/18 10:54


 


BP  125/63   07/05/18 10:54


 


Pulse Ox  96   07/05/18 11:46











Weight - Most Recent: 89.074 kg


I&O - Last 24 Hours: 


 Intake & Output











 07/04/18 07/05/18 07/05/18





 22:59 06:59 14:59


 


Intake Total 1270 1970 


 


Output Total 900 1800 700


 


Balance 370 170 -700











Lab Results Last 24 Hours: 


 Laboratory Results - last 24 hr











  07/05/18 07/05/18 Range/Units





  04:48 04:48 


 


WBC  9.2   (4.5-11.0)  K/uL


 


RBC  3.65   (3.30-5.50)  M/uL


 


Hgb  10.9 L   (12.0-15.0)  g/dL


 


Hct  33.7 L   (36.0-48.0)  %


 


MCV  92   (80-98)  fL


 


MCH  30   (27-31)  pg


 


MCHC  32   (32-36)  %


 


Plt Count  213   (150-400)  K/uL


 


Sodium   144  (140-148)  mmol/L


 


Potassium   3.5 L  (3.6-5.2)  mmol/L


 


Chloride   109 H  (100-108)  mmol/L


 


Carbon Dioxide   28  (21-32)  mmol/L


 


Anion Gap   10.5  (5.0-14.0)  mmol/L


 


BUN   11  (7-18)  mg/dL


 


Creatinine   0.9  (0.6-1.0)  mg/dL


 


Est Cr Clr Drug Dosing   49.53  mL/min


 


Estimated GFR (MDRD)   > 60  (>60)  


 


Glucose   175 H  ()  mg/dL


 


Calcium   8.5  (8.5-10.1)  mg/dL


 


Phosphorus   2.7  (2.5-4.9)  mg/dL


 


Magnesium   2.1  (1.8-2.4)  mg/dL


 


Total Bilirubin   0.2  (0.2-1.0)  mg/dL


 


AST   13 L  (15-37)  U/L


 


ALT   19  (12-78)  U/L


 


Alkaline Phosphatase   72  ()  U/L


 


NT-Pro-B Natriuret Pep   2610 H  (5-125)  pg/mL


 


Total Protein   6.3 L  (6.4-8.2)  g/dL


 


Albumin   2.8 L  (3.4-5.0)  g/dL


 


Globulin   3.5  (2.3-3.5)  g/dL


 


Albumin/Globulin Ratio   0.8 L  (1.2-2.2)  











Med Orders - Current: 


 Current Medications





Acetaminophen (Tylenol)  650 mg PO Q4H PRN


   PRN Reason: Pain (Mild 1-3)/fever


   Last Admin: 07/04/18 21:10 Dose:  650 mg


Hydrocodone Bitart/Acetaminophen (Norco 325-5 Mg)  1 tab PO Q4H PRN


   PRN Reason: Pain (moderate 4-6)


   Last Admin: 07/04/18 21:11 Dose:  1 tab


Albuterol (Proventil Neb Soln)  2.5 mg NEB Q4H PRN


   PRN Reason: Shortness Of Breath/wheezing


Albuterol (Ventolin Hfa)  0 gm INH Q6H PRN


   PRN Reason: Dyspnea


Albuterol/Ipratropium (Duoneb 3.0-0.5 Mg/3 Ml)  3 ml NEB Q4H PRN


   PRN Reason: Shortness of Breath


Albuterol/Ipratropium (Duoneb 3.0-0.5 Mg/3 Ml)  3 ml NEB QIDRT [facility]


   Last Admin: 07/05/18 11:46 Dose:  3 ml


Bisacodyl (Dulcolax)  5 mg PO DAILY PRN


   PRN Reason: Constipation


   Last Admin: 07/03/18 22:31 Dose:  5 mg


Bupropion HCl (Wellbutrin)  150 mg PO BID [facility]


   Last Admin: 07/05/18 08:19 Dose:  150 mg


Clonazepam (Klonopin)  0.5 mg PO DAILY [facility]


   Last Admin: 07/05/18 09:32 Dose:  Not Given


Docusate Sodium (Colace)  100 mg PO BID PRN


   PRN Reason: Constipation


   Last Admin: 07/03/18 22:31 Dose:  100 mg


Furosemide (Lasix)  40 mg PO DAILY PRN


   PRN Reason: Other


   Last Admin: 07/03/18 22:32 Dose:  40 mg


Piperacillin/Tazobactam/ (Dextrose 3.375 gm/ Premix)  50 mls @ 100 mls/hr IV 

Q6H [facility]


   Last Admin: 07/05/18 09:18 Dose:  100 mls/hr


Lamotrigine 100 mg/ (Lamotrigine 50 mg)  150 mg PO BEDTIME [facility]


   Last Admin: 07/04/18 22:58 Dose:  150 mg


Levothyroxine Sodium (Levothyroxine)  25 mcg PO ACBREAKFAST [facility]


   Last Admin: 07/05/18 08:18 Dose:  25 mcg


Loratadine (Claritin)  10 mg PO DAILY [facility]


   Last Admin: 07/05/18 08:18 Dose:  10 mg


Morphine Sulfate (Morphine)  2 mg IVPUSH Q2H PRN


   PRN Reason: Pain (severe 7-10)


Nicotine (Habitrol)  14 mg TRDERM DAILY [facility]


   Last Admin: 07/05/18 08:18 Dose:  14 mg


Ondansetron HCl (Zofran Odt)  4 mg PO Q6H PRN


   PRN Reason: Nausea able to take PO


Ondansetron HCl (Zofran)  4 mg IV Q4H PRN


   PRN Reason: Nausea/Vomiting


Sertraline HCl (Zoloft)  50 mg PO DAILY [facility]


   Last Admin: 07/05/18 08:19 Dose:  50 mg


Sucralfate (Carafate)  1 gm PO QIDACANDBED [facility]


   Last Admin: 07/05/18 11:25 Dose:  1 gm


Tizanidine HCl (Zanaflex)  4 mg PO Q8H PRN


   PRN Reason: Muscle Spasm


Triamcinolone Acetonide (Triamcinolone Acetonide 0.1% Crm)  0 gm TOP TID [facility]


   Last Admin: 07/05/18 09:32 Dose:  Not Given





Discontinued Medications





Albuterol/Ipratropium (Duoneb 3.0-0.5 Mg/3 Ml)  3 ml NEB QID [facility]


   Last Admin: 07/04/18 05:28 Dose:  3 ml


Bupropion HCl (Wellbutrin)  150 mg PO BID [facility]


   Last Admin: 07/03/18 22:45 Dose:  150 mg


Sodium Chloride (Normal Saline)  1,000 mls @ 75 mls/hr IV ASDIRECTED [facility]


   Last Admin: 07/03/18 15:57 Dose:  75 mls/hr


Sodium Chloride (Normal Saline)  80 mls @ 3 mls/sec IV ASDIRECTED [facility]


   Stop: 07/03/18 23:00


   Last Admin: 07/03/18 17:44 Dose:  3 mls/sec


Sodium Chloride (Normal Saline)  1,000 mls @ 75 mls/hr IV ASDIRECTED [facility]


   Last Admin: 07/03/18 21:00 Dose:  75 mls/hr


Metronidazole 500 mg/ Premix  100 mls @ 100 mls/hr IV Q6H [facility]


   Last Admin: 07/04/18 04:08 Dose:  100 mls/hr


Piperacillin Sod/Tazobactam (Sod 3.375 gm/ Sodium Chloride)  50 mls @ 100 mls/

hr IV Q6H [facility]


   Last Admin: 07/04/18 04:08 Dose:  100 mls/hr


Sodium Chloride (Normal Saline)  1,000 mls @ 125 mls/hr IV ASDIRECTED [facility]


   Last Admin: 07/04/18 08:21 Dose:  125 mls/hr


Metronidazole 500 mg/ Premix  100 mls @ 100 mls/hr IV Q6H [facility]


Dextrose/Lactated Ringer's (Dextrose 5%-Lactated Ringers)  1,000 mls @ 100 mls/

hr IV ASDIRECTED [facility]


   Last Admin: 07/04/18 16:14 Dose:  100 mls/hr


Iopamidol (Isovue-300 (61%))  135 ml IV .AS DIRECTED [facility]


   Stop: 07/03/18 22:00


   Last Admin: 07/03/18 21:57 Dose:  Not Given


Lamotrigine (Lamotrigine)  150 mg PO BEDTIME [facility]


   Last Admin: 07/03/18 22:32 Dose:  150 mg


Levothyroxine Sodium (Levothyroxine)  25 mcg PO DAILY [facility]


Lorazepam (Ativan)  1 mg IV Q6H PRN


   PRN Reason: Nausea/Vomiting


   Last Admin: 07/05/18 08:17 Dose:  1 mg


Methylprednisolone Sodium Succinate (Solu-Medrol)  125 mg IVPUSH ONETIME ONE


   Stop: 07/03/18 21:28


   Last Admin: 07/03/18 22:35 Dose:  125 mg


Methylprednisolone Sodium Succinate (Solu-Medrol)  62.5 mg IVPUSH Q6H [facility]


   Last Admin: 07/04/18 04:08 Dose:  62.5 mg


Methylprednisolone Sodium Succinate (Solu-Medrol)  62.5 mg IVPUSH Q6H [facility]


   Last Admin: 07/04/18 09:51 Dose:  62.5 mg


Methylprednisolone Sodium Succinate (Solu-Medrol)  62.5 mg IVPUSH Q12H [facility]


   Last Admin: 07/05/18 10:31 Dose:  62.5 mg


Morphine Sulfate (Morphine)  5 mg IVPUSH ONETIME ONE


   Stop: 07/03/18 15:19


   Last Admin: 07/03/18 15:56 Dose:  5 mg


Morphine Sulfate (Morphine)  2 mg IVPUSH ONETIME ONE


   Stop: 07/03/18 17:13


   Last Admin: 07/03/18 17:30 Dose:  2 mg


Pantoprazole Sodium (Protonix Iv***)  40 mg IV DAILY [facility]


   Last Admin: 07/05/18 08:19 Dose:  40 mg


Sodium Chloride (Saline Flush)  10 ml FLUSH ASDIRECTED PRN


   PRN Reason: Keep Vein Open


   Last Admin: 07/03/18 15:57 Dose:  10 ml


Sodium Chloride (Saline Flush)  10 ml FLUSH ONETIME ONE


   Stop: 07/03/18 17:21


   Last Admin: 07/03/18 17:40 Dose:  10 ml


Trazodone HCl (Trazodone)  50 mg PO BEDTIME PRN


   PRN Reason: Sleep


   Last Admin: 07/03/18 22:31 Dose:  50 mg


Trazodone HCl (Trazodone)  50 - 100 mg PO BEDTIME PRN


   PRN Reason: Sleep


   Last Admin: 07/04/18 23:03 Dose:  100 mg











- Exam


Quality Assessment: Supplemental Oxygen


General: Alert, Oriented, Cooperative, No Acute Distress


Neck: Supple


Lungs: Normal Respiratory Effort


GI/Abdominal Exam: Soft, No Distention


Extremities: No Pedal Edema


Psy/Mental Status: Alert, Normal Affect





- Problem List Review


Problem List Initiated/Reviewed/Updated: Yes





- My Orders


Last 24 Hours: 


My Active Orders





07/04/18 21:11


traZODone   50 - 100 mg PO BEDTIME PRN 





07/05/18 12:05


Convert IV to Saline Lock [OM.PC] Routine 





07/05/18 12:06


LORazepam [Ativan]   1 mg PO Q4H PRN 





07/05/18 12:07


traZODone   100 mg PO BEDTIME PRN 





07/06/18 07:30


Pantoprazole [ProTONIX***]   40 mg PO ACBREAKFAST 














- Plan


Plan:: 








ASSESSMENT / PLAN





Acute diverticulitis of the mid sigmoid colon - Abdominal pain steadily 

improving. No fevers overnight.


-Saline lock IV fluids


-Antibiotic coverage with Piperacillin/tazobactam 3.375mg  IV every 6 hours


-Discontinue steroids


-Pain control


-Trial of full liquids





COPD - stable with no evidence for exacerbation.


-albuterol nebulizer every 4 hours as needed for wheezing and cough


-Duo nebu ; schedule  nebulize every 6 hours     


-oxygen per nasal cannula to keep sats greater than 92%              


-Advise to notify nurses of any chest pain or other symptoms 





Tobacco dependence - patient currently utilizing a nicotine patch.





Maintenance issues


-Nutrition: Clear liquids


-Pablo catheter - not indicated at this time


-DVT: SCD


-PPI: PPI





Disposition - I would anticipate discharge to home after the hospital stay, 

probably tomorrow





Phil Nelson M.D.

## 2018-07-06 VITALS — DIASTOLIC BLOOD PRESSURE: 72 MMHG | SYSTOLIC BLOOD PRESSURE: 118 MMHG

## 2018-07-06 RX ADMIN — TAZOBACTAM SODIUM AND PIPERACILLIN SODIUM SCH MLS/HR: 375; 3 INJECTION, SOLUTION INTRAVENOUS at 11:21

## 2018-07-06 RX ADMIN — TAZOBACTAM SODIUM AND PIPERACILLIN SODIUM SCH MLS/HR: 375; 3 INJECTION, SOLUTION INTRAVENOUS at 03:29

## 2018-07-06 RX ADMIN — TRIAMCINOLONE ACETONIDE SCH: 1 CREAM TOPICAL at 09:39

## 2018-07-06 RX ADMIN — ALUMINUM HYDROXIDE, MAGNESIUM HYDROXIDE, AND SIMETHICONE ONE ML: 200; 200; 20 SUSPENSION ORAL at 11:31

## 2018-07-06 NOTE — DISCH
ADMISSION DIAGNOSES:

1. Abdominal pain.

2. Chronic obstructive pulmonary disease.

3. Mild concentric left ventricular hypertrophy.

4. Panlobular emphysema.

5. Chronic low back pain.

6. Chronic obstructive lung disease.

 

DISCHARGE DIAGNOSIS:  Diverticulitis.

 

HISTORY:  Juany Grey is a 62-year-old female who presented to the emergency department at

[facility] for abdominal pain on 07/03/2018.  She reports abdominal pain

started on 07/01/2018.  She had chills, but no fever.  The pain gradually worsened.  She had

no vomiting, red or black stools, and no fever.  A CT scan was obtained, and there was

severe colonic diverticulosis with moderate acute diverticulitis of the mid-sigmoid colon

and a small collection of fluid to the left of the urinary bladder, deep to the anterior

abdominal wall, forced to represent postsurgical seroma from her hernia repair.

 

HOSPITAL COURSE:  She was admitted to the hospital and started on IV Flagyl and Zosyn.

Abdominal pain did lessen.  Her abdomen was much less tender, and she started having bowel

movements.  She remained to be on a clear liquid diet and advanced to a full liquid diet on

07/05/2018.  On 07/06/2018, she was started on a low-residue diet, received dietary

education and instructions, and was able to be discharged to home without any complications.

 

ALLERGIES:  JUANY HAS AN ALLERGY TO CIPRO.

 

 

REVIEW OF SYSTEMS:  CONSTITUTIONAL:  Denies any fever, chills, night sweats, or fatigue.  No

weight loss.

NECK:  Negative.

HEART:  No chest pain, shortness of breath, fast or irregular heartbeats.  The chest pain

was evaluated that she did have and was thought to be GI symptoms.

ABDOMEN:  As above.

:  Negative.

MUSCULOSKELETAL:  Chronic back pain and some joint pain and stiffness.

SKIN:  Negative for rash.

NEURO:  No headaches, dizziness, or loss of coordination.

PSYCHIATRIC:  Mood and affect appropriate.

 

Remainder of review of systems negative for any pertinent positives and negatives.

 

PHYSICAL EXAMINATION:

GENERAL:  Juany Grey is a 62-year-old female.

VITAL SIGNS:  Height is 5 feet 1 inch.  Weight is 196 pounds.  TPR is 98.1, 76, 18, and

blood pressure 127/72.

HEENT:  Negative.

NECK:  Supple.

HEART:  Regular rate and rhythm.

LUNGS:  Clear.

ABDOMEN:  Soft and nontender.

EXTREMITIES:  Without peripheral edema.

NEURO:  Intact.

SKIN:  Without rash.

PSYCHIATRIC:  Mood and affect appropriate.

 

DISPOSITION:  Discharged to home.

 

CONDITION:  Stable and improving.

 

FOLLOWUP APPOINTMENT:  With Arian August MD, at [facility] on

07/18/2018 at 9 a.m.

 

DISCHARGE MEDICATIONS:  She is to resume home medications of;

1. Albuterol 2 puffs every 6 hours p.r.n. shortness of breath.

2. DuoNeb 3 mL inhalation every 4 hours p.r.n. shortness of breath.

3. Clonazepam 0.5 mg oral daily.

4. Furosemide 40 mg oral daily.

5. Norco 5/325 mg 1 to 2 tabs every 4 hours p.r.n. pain.

6. Synthroid 25 mcg daily.

7. Lidocaine one applicator topical as directed.

8. Loratadine 10 mg oral daily.

9. Magnesium oxide 250 mg oral as directed.

10.Elocon 0.1% cream topical daily.

11.Zofran 4 mg every 4 hours.

12.Protonix 40 mg oral twice daily.

13.Penciclovir (Denavir) 1% cream 1.5 topical as directed.

14.Zoloft 50 mg oral daily.

15.Carafate one tablet before meals and at bedtime.

16.Triamcinolone cream three times a day to affected area.

17.Chantix one tablet twice daily.

18.Wellbutrin 150 oral twice daily.

19.Lamotrigine 150 mg oral at bedtime.

20.Tizanidine 4 mg oral every 8 hours p.r.n. muscle spasm.

21.Trazodone one to two tabs at bedtime for sleep.

22.Augmentin 875/125 mg one tablet b.i.d. for 10 days.

23.Flagyl 500 mg p.o. q.8 hours, #30.

Recommendations of diverticulitis from up to date.

 

DIET:  Soft, low-residue diet.  Drink 8 to 10 glasses of water a day.

 

ACTIVITY:  As tolerated.  Shower/bathing, may shower.

 

DISCHARGE INSTRUCTIONS:  Notify provider if any fever, increased pain, nausea, or vomiting.

 

Time of discharge, greater than 10 minutes.

## 2018-07-06 NOTE — PCM.SN
- Free Text/Narrative


Note: 





Pt started having moderate central chest pain while walking. Had similar pain 

yesterday and again this morning. Worse when trying to swallow, even with 

coffee. No change with pressure on chest. No nausea or diaphoresis. Does not 

feel short of breath. EKG shows sinus rhythm some borderline ST changes 

inferiorly and laterally. The EKG appears similar to the one from 2015. 





Planning trial of GI cocktail and Q3H troponins but this sounds like GI rather 

than cardiac pain. Exam is benign. 





Demetrius Nelson MD

## 2019-01-14 ENCOUNTER — HOSPITAL ENCOUNTER (EMERGENCY)
Dept: HOSPITAL 11 - JP.ED | Age: 64
LOS: 1 days | Discharge: HOME | End: 2019-01-15
Payer: MEDICARE

## 2019-01-14 VITALS — SYSTOLIC BLOOD PRESSURE: 144 MMHG | DIASTOLIC BLOOD PRESSURE: 75 MMHG

## 2019-01-14 DIAGNOSIS — Z87.19: ICD-10-CM

## 2019-01-14 DIAGNOSIS — Z88.1: ICD-10-CM

## 2019-01-14 DIAGNOSIS — R10.32: Primary | ICD-10-CM

## 2019-01-14 DIAGNOSIS — Z88.5: ICD-10-CM

## 2019-01-14 DIAGNOSIS — F17.210: ICD-10-CM

## 2019-01-14 DIAGNOSIS — Z79.899: ICD-10-CM

## 2019-01-14 PROCEDURE — 96374 THER/PROPH/DIAG INJ IV PUSH: CPT

## 2019-01-14 PROCEDURE — 96361 HYDRATE IV INFUSION ADD-ON: CPT

## 2019-01-14 PROCEDURE — 74176 CT ABD & PELVIS W/O CONTRAST: CPT

## 2019-01-14 PROCEDURE — 83605 ASSAY OF LACTIC ACID: CPT

## 2019-01-14 PROCEDURE — 83690 ASSAY OF LIPASE: CPT

## 2019-01-14 PROCEDURE — 99284 EMERGENCY DEPT VISIT MOD MDM: CPT

## 2019-01-14 PROCEDURE — 81001 URINALYSIS AUTO W/SCOPE: CPT

## 2019-01-14 PROCEDURE — 85025 COMPLETE CBC W/AUTO DIFF WBC: CPT

## 2019-01-14 PROCEDURE — 36415 COLL VENOUS BLD VENIPUNCTURE: CPT

## 2019-01-14 PROCEDURE — 80053 COMPREHEN METABOLIC PANEL: CPT

## 2019-01-14 NOTE — EDM.PDOC
ED HPI GENERAL MEDICAL PROBLEM





- General


Chief Complaint: Abdominal Pain


Stated Complaint: ABD PAIN


Time Seen by Provider: 19 22:35


Source of Information: Reports: Patient, Family, Old Records, RN Notes Reviewed


History Limitations: Reports: No Limitations





- History of Present Illness


INITIAL COMMENTS - FREE TEXT/NARRATIVE: 





63-year-old female presents to the emergency department today with complaint of 

abdominal pain. She states he's had abdominal pain over the last 2 days it is 

progressively getting worse is mainly in the left lower quadrant. She has felt 

nauseated no vomiting she still passing gas no shortness of breath or chest 

pain. Does have extensive history of abdominal surgeries 7 does have a recent 

bout of diverticulitis the summer


  ** Abdominal


Pain Score (Numeric/FACES): 8





- Related Data


 Allergies











Allergy/AdvReac Type Severity Reaction Status Date / Time


 


ciprofloxacin [From Cipro] Allergy  Hives Verified 19 22:31


 


ciprofloxacin HCl Allergy  Hives Verified 19 22:31





[From Cipro]     


 


clonidine HCl [From Catapres] Allergy  Blisters Verified 19 22:31


 


hydromorphone Allergy  Itching Verified 19 22:31


 


oxycodone [From Percocet] Allergy  Hives Verified 19 22:31











Home Meds: 


 Home Meds





Levothyroxine Sodium [Synthroid] 25 mcg PO DAILY 01/29/15 [History]


Pantoprazole [ProTONIX Granules***] 40 mg PO BID 01/29/15 [History]


Albuterol Sulfate [Albuterol Sulfate HFA] 2 puff INH Q6H PRN 05/21/15 [History]


Ondansetron HCl [Zofran] 4 mg PO Q4H PRN 05/21/15 [History]


Loratadine 10 mg PO DAILY 16 [History]


buPROPion [Wellbutrin] 150 mg PO BID 09/10/16 [History]


Furosemide 40 mg PO DAILY PRN 17 [History]


Triamcinolone Acetonide [Kenalog 0.1% Crm] 1 applic TOP TID 17 [History]


Albuterol/Ipratropium [DuoNeb 3.0-0.5 MG/3 ML] 3 ml IH Q4H PRN 07/10/17 [History

]


Magnesium Oxide [Magnesium] 250 mg PO ASDIRECTED 17 [History]


Sertraline HCl [Zoloft] 50 mg PO DAILY 17 [History]


lamoTRIgine [Lamotrigine] 150 mg PO BEDTIME 17 [History]


traZODone 1 - 2 tab PO BEDTIME PRN 17 [History]


ClonazePAM [KlonoPIN] 0.5 mg PO DAILY 17 [History]


Penciclovir [Denavir 1% Crm] 1.5 gm TP ASDIRECTED 17 [History]


Mometasone Furoate [Elocon 0.1% Crm] 1 applic TOP DAILY 18 [History]


Sucralfate [Carafate] 1 tab PO QIDACANDBED 18 [History]


tiZANidine [Zanaflex] 4 mg PO Q8H PRN 18 [History]











Past Medical History


HEENT History: Reports: Allergic Rhinitis, Impaired Vision


Cardiovascular History: Reports: Cardiomyopathy, SOB on Exertion


Respiratory History: Reports: Bronchitis, Recurrent, COPD, Sleep Apnea


Gastrointestinal History: Reports: Bowel Obstruction, Diverticulosis, GERD, 

Hiatal Hernia


Genitourinary History: Reports: UTI, Recurrent, Other (See Below)


Other Genitourinary History: bladder suspension


OB/GYN History: Reports: Pregnancy, Spontaneous 


Musculoskeletal History: Reports: Arthritis, Back Pain, Chronic, Fibromyalgia


Neurological History: Reports: Vertigo


Psychiatric History: Reports: Anxiety, Depression


Endocrine/Metabolic History: Reports: Hypothyroidism, Obesity/BMI 30+


Immunologic History: Reports: None


Oncologic (Cancer) History: Reports: Uterine


Other Oncologic History: complete hysterectomy 2015


Dermatologic History: Reports: Cellulitis


Other Dermatologic History: sore on right lower leg that won't go away





- Infectious Disease History


Infectious Disease History: Reports: Chicken Pox, Measles





- Past Surgical History


HEENT Surgical History: Reports: Other (See Below)


Other HEENT Surgeries/Procedures: biopsy on tongue


GI Surgical History: Reports: Appendectomy, Colon, Colonoscopy, EGD, Hernia 

Repair/Other


Female  Surgical History: Reports: Hysterectomy, Salpingo-Oophorectomy





Social & Family History





- Family History


Family Medical History: Noncontributory





- Tobacco Use


Smoking Status *Q: Current Every Day Smoker


Years of Tobacco use: 48


Packs/Tins Daily: 0.5





- Caffeine Use


Caffeine Use: Reports: Coffee





- Recreational Drug Use


Recreational Drug Use: No





- Living Situation & Occupation


Living situation: Reports:  (Lives with her 80+ mother, and her sister 

is in the next house. Has 1 son and 2 grandchildren , who she is currently 

estranged from her child and grandchildren.)





ED ROS GENERAL





- Review of Systems


Review Of Systems: See Below


Constitutional: Denies: Fever, Chills


HEENT: Reports: No Symptoms


Respiratory: Reports: Shortness of Breath (Not beyond baseline)


Cardiovascular: Reports: No Symptoms


GI/Abdominal: Reports: Abdominal Pain, Flatus, Nausea.  Denies: Vomiting


: Reports: No Symptoms


Musculoskeletal: Reports: No Symptoms


Skin: Reports: No Symptoms





ED EXAM, GI/ABD





- Physical Exam


Exam: See Below


Exam Limited By: No Limitations


General Appearance: Alert, WD/WN, No Apparent Distress


Head: Atraumatic, Normocephalic


Neck: Normal Inspection, Supple, Non-Tender, Full Range of Motion


Respiratory/Chest: No Respiratory Distress, No Accessory Muscle Use, Chest Non-

Tender, Decreased Breath Sounds


Cardiovascular: Regular Rate, Rhythm, No Murmur


GI/Abdominal Exam: Normal Bowel Sounds, Soft, Tender (Tender left lower quadrant

)





Course





- Vital Signs


Last Recorded V/S: 


 Last Vital Signs











Temp  97.4 F   19 22:23


 


Pulse  86   19 22:23


 


Resp  16   19 22:23


 


BP  144/75 H  19 22:23


 


Pulse Ox  92 L  19 22:23














- Orders/Labs/Meds


Orders: 


 Active Orders 24 hr











 Category Date Time Status


 


 Peripheral IV Care [RC] .AS DIRECTED Care  19 22:41 Active


 


 Abdomen Pelvis wo Cont [CT] Stat Exams  19 23:32 Taken


 


 Lactated Ringers [Ringers, Lactated] 1,000 ml Med  19 22:45 Active





 IV ASDIRECTED   


 


 Sodium Chloride 0.9% [Saline Flush] Med  19 22:54 Active





 10 ml FLUSH ASDIRECTED PRN   


 


 Peripheral IV Insertion Adult [OM.PC] Urgent Oth  19 22:40 Ordered








 Medication Orders





Lactated Ringer's (Ringers, Lactated)  1,000 mls @ 500 mls/hr IV ASDIRECTED ABEL


   Last Admin: 19 23:31  Dose: 500 mls/hr


Sodium Chloride (Saline Flush)  10 ml FLUSH ASDIRECTED PRN


   PRN Reason: Keep Vein Open








Labs: 


 Laboratory Tests











  19 Range/Units





  22:40 23:02 23:02 


 


WBC  7.7    (4.5-11.0)  K/uL


 


RBC  4.31    (3.30-5.50)  M/uL


 


Hgb  13.2  D    (12.0-15.0)  g/dL


 


Hct  40.1    (36.0-48.0)  %


 


MCV  93    (80-98)  fL


 


MCH  31    (27-31)  pg


 


MCHC  33    (32-36)  %


 


Plt Count  237    (150-400)  K/uL


 


Neut % (Auto)  58    (36-66)  %


 


Lymph % (Auto)  35    (24-44)  %


 


Mono % (Auto)  6    (2-6)  %


 


Eos % (Auto)  2    (2-4)  %


 


Baso % (Auto)  0    (0-1)  %


 


Sodium   141   (140-148)  mmol/L


 


Potassium   3.9   (3.6-5.2)  mmol/L


 


Chloride   101   (100-108)  mmol/L


 


Carbon Dioxide   32   (21-32)  mmol/L


 


Anion Gap   8.4   (5.0-14.0)  mmol/L


 


BUN   23 H D   (7-18)  mg/dL


 


Creatinine   1.7 H D   (0.6-1.0)  mg/dL


 


Est Cr Clr Drug Dosing   25.89   mL/min


 


Estimated GFR (MDRD)   30 L   (>60)  


 


Glucose   105   ()  mg/dL


 


Lactic Acid    1.3  (0.4-2.0)  mmol/L


 


Calcium   9.0   (8.5-10.1)  mg/dL


 


Total Bilirubin   0.2   (0.2-1.0)  mg/dL


 


AST   15   (15-37)  U/L


 


ALT   20   (12-78)  U/L


 


Alkaline Phosphatase   102   ()  U/L


 


Total Protein   7.2   (6.4-8.2)  g/dL


 


Albumin   3.4   (3.4-5.0)  g/dL


 


Globulin   3.8 H   (2.3-3.5)  g/dL


 


Albumin/Globulin Ratio   0.9 L   (1.2-2.2)  


 


Lipase   156   ()  U/L


 


Urine Color     


 


Urine Appearance     


 


Urine pH     (4.5-8.0)  


 


Ur Specific Gravity     (1.008-1.030)  


 


Urine Protein     (NEGATIVE)  mg/dL


 


Urine Glucose (UA)     (NEGATIVE)  mg/dL


 


Urine Ketones     (NEGATIVE)  mg/dL


 


Urine Occult Blood     (NEGATIVE)  


 


Urine Nitrite     (NEGATIVE)  


 


Urine Bilirubin     (NEGATIVE)  


 


Urine Urobilinogen     (NORMAL)  mg/dL


 


Ur Leukocyte Esterase     (NEGATIVE)  


 


Urine RBC     (0-5)  


 


Urine WBC     (0-5)  


 


Ur Epithelial Cells     


 


Amorphous Sediment     


 


Urine Bacteria     


 


Urine Mucus     














  19 Range/Units





  23:14 


 


WBC   (4.5-11.0)  K/uL


 


RBC   (3.30-5.50)  M/uL


 


Hgb   (12.0-15.0)  g/dL


 


Hct   (36.0-48.0)  %


 


MCV   (80-98)  fL


 


MCH   (27-31)  pg


 


MCHC   (32-36)  %


 


Plt Count   (150-400)  K/uL


 


Neut % (Auto)   (36-66)  %


 


Lymph % (Auto)   (24-44)  %


 


Mono % (Auto)   (2-6)  %


 


Eos % (Auto)   (2-4)  %


 


Baso % (Auto)   (0-1)  %


 


Sodium   (140-148)  mmol/L


 


Potassium   (3.6-5.2)  mmol/L


 


Chloride   (100-108)  mmol/L


 


Carbon Dioxide   (21-32)  mmol/L


 


Anion Gap   (5.0-14.0)  mmol/L


 


BUN   (7-18)  mg/dL


 


Creatinine   (0.6-1.0)  mg/dL


 


Est Cr Clr Drug Dosing   mL/min


 


Estimated GFR (MDRD)   (>60)  


 


Glucose   ()  mg/dL


 


Lactic Acid   (0.4-2.0)  mmol/L


 


Calcium   (8.5-10.1)  mg/dL


 


Total Bilirubin   (0.2-1.0)  mg/dL


 


AST   (15-37)  U/L


 


ALT   (12-78)  U/L


 


Alkaline Phosphatase   ()  U/L


 


Total Protein   (6.4-8.2)  g/dL


 


Albumin   (3.4-5.0)  g/dL


 


Globulin   (2.3-3.5)  g/dL


 


Albumin/Globulin Ratio   (1.2-2.2)  


 


Lipase   ()  U/L


 


Urine Color  Yellow  


 


Urine Appearance  Clear  


 


Urine pH  6.0  (4.5-8.0)  


 


Ur Specific Gravity  1.015  (1.008-1.030)  


 


Urine Protein  Negative  (NEGATIVE)  mg/dL


 


Urine Glucose (UA)  Normal  (NEGATIVE)  mg/dL


 


Urine Ketones  Negative  (NEGATIVE)  mg/dL


 


Urine Occult Blood  Negative  (NEGATIVE)  


 


Urine Nitrite  Negative  (NEGATIVE)  


 


Urine Bilirubin  Negative  (NEGATIVE)  


 


Urine Urobilinogen  Normal  (NORMAL)  mg/dL


 


Ur Leukocyte Esterase  Negative  (NEGATIVE)  


 


Urine RBC  0-5  (0-5)  


 


Urine WBC  0-5  (0-5)  


 


Ur Epithelial Cells  Few  


 


Amorphous Sediment  Rare  


 


Urine Bacteria  Not seen  


 


Urine Mucus  Not seen  











Meds: 


Medications











Generic Name Dose Route Start Last Admin





  Trade Name Freq  PRN Reason Stop Dose Admin


 


Lactated Ringer's  1,000 mls @ 500 mls/hr  19 22:45  19 23:31





  Ringers, Lactated  IV   500 mls/hr





  ASDIRECTED ABEL   Administration





     





     





     





     


 


Sodium Chloride  10 ml  19 22:54  





  Saline Flush  FLUSH   





  ASDIRECTED PRN   





  Keep Vein Open   





     





     





     














Discontinued Medications














Generic Name Dose Route Start Last Admin





  Trade Name Freq  PRN Reason Stop Dose Admin


 


Sodium Chloride  80 mls @ 3 mls/sec  19 23:00  





  Normal Saline  IV   





  ASDIRECTED [facility]   





     





     





     





     


 


Iopamidol  126 ml  19 23:00  





  Isovue-300 (61%)  IV   





  .AS DIRECTED [facility]   





     





     





     





     


 


Prochlorperazine Edisylate  5 mg  19 22:45  19 23:28





  Compazine  IVPUSH  19 22:46  5 mg





  ONETIME ONE   Administration





     





     





     





     


 


Sodium Chloride  10 ml  19 22:40  





  Saline Flush  FLUSH   





  ASDIRECTED PRN   





  Keep Vein Open   





     





     





     














Departure





- Departure


Time of Disposition: 00:32


Disposition: Home, Self-Care 01


Condition: Fair


Clinical Impression: 


 LLQ abdominal pain, Left lower quadrant abdominal pain








- Discharge Information


Referrals: 


Gabriel Schmidt MD [Primary Care Provider] - 


Forms:  ED Department Discharge


Additional Instructions: 


Continue to use Tylenol or Motrin as needed for pain control, use your anti-

nausea medication artery prescribed,  Please followup with your primary care 

provider in 3-5 days if not better, please call return to the emergency 

department with worsening of symptoms.





- My Orders


Last 24 Hours: 


My Active Orders





19 22:40


Peripheral IV Insertion Adult [OM.PC] Urgent 





19 22:41


Peripheral IV Care [RC] .AS DIRECTED 





19 22:45


Lactated Ringers [Ringers, Lactated] 1,000 ml IV ASDIRECTED 





19 22:54


Sodium Chloride 0.9% [Saline Flush]   10 ml FLUSH ASDIRECTED PRN 





19 23:32


Abdomen Pelvis wo Cont [CT] Stat 














- Assessment/Plan


Last 24 Hours: 


My Active Orders





19 22:40


Peripheral IV Insertion Adult [OM.PC] Urgent 





19 22:41


Peripheral IV Care [RC] .AS DIRECTED 





19 22:45


Lactated Ringers [Ringers, Lactated] 1,000 ml IV ASDIRECTED 





19 22:54


Sodium Chloride 0.9% [Saline Flush]   10 ml FLUSH ASDIRECTED PRN 





19 23:32


Abdomen Pelvis wo Cont [CT] Stat 











Plan: 





Assessment





Acuity = acute





Site and laterality = left lower quadrant abdominal pain  complicated patient 

with known history of diverticulosis





Etiology  = unclear etiology  





Manifestations = none]  





Location of injury =  Home





Lab values = CBC within normal limits, creatinine elevated 1.7 consistent with 

acute renal failure stage G IIIB, urinalysis unremarkable CT scan of the 

abdomen shows no acute process  





Plan


I reviewed lab work CT scan results with her elected to do watchful waiting at 

this time she is given a follow-up with primary care the next 3-5 days for 

reevaluation if pain persists 

















 This note was dictated using dragon voice recognition software please call 

with any questions on syntax or grammar.

## 2019-03-08 ENCOUNTER — HOSPITAL ENCOUNTER (EMERGENCY)
Dept: HOSPITAL 11 - JP.ED | Age: 64
Discharge: HOME | End: 2019-03-08
Payer: MEDICARE

## 2019-03-08 VITALS — SYSTOLIC BLOOD PRESSURE: 106 MMHG | DIASTOLIC BLOOD PRESSURE: 51 MMHG

## 2019-03-08 DIAGNOSIS — Z88.1: ICD-10-CM

## 2019-03-08 DIAGNOSIS — F32.9: ICD-10-CM

## 2019-03-08 DIAGNOSIS — F17.210: ICD-10-CM

## 2019-03-08 DIAGNOSIS — Z79.899: ICD-10-CM

## 2019-03-08 DIAGNOSIS — K57.32: Primary | ICD-10-CM

## 2019-03-08 DIAGNOSIS — F41.9: ICD-10-CM

## 2019-03-08 DIAGNOSIS — Z88.6: ICD-10-CM

## 2019-03-08 DIAGNOSIS — E03.9: ICD-10-CM

## 2019-03-08 PROCEDURE — 83690 ASSAY OF LIPASE: CPT

## 2019-03-08 PROCEDURE — 81001 URINALYSIS AUTO W/SCOPE: CPT

## 2019-03-08 PROCEDURE — 96365 THER/PROPH/DIAG IV INF INIT: CPT

## 2019-03-08 PROCEDURE — 96372 THER/PROPH/DIAG INJ SC/IM: CPT

## 2019-03-08 PROCEDURE — 99284 EMERGENCY DEPT VISIT MOD MDM: CPT

## 2019-03-08 PROCEDURE — 85025 COMPLETE CBC W/AUTO DIFF WBC: CPT

## 2019-03-08 PROCEDURE — 80053 COMPREHEN METABOLIC PANEL: CPT

## 2019-03-08 PROCEDURE — 74176 CT ABD & PELVIS W/O CONTRAST: CPT

## 2019-03-08 PROCEDURE — 36415 COLL VENOUS BLD VENIPUNCTURE: CPT

## 2019-03-08 PROCEDURE — 86140 C-REACTIVE PROTEIN: CPT

## 2019-03-08 NOTE — EDM.PDOC
<Gabriel Storm - Last Filed: 19 16:35>





ED HPI GENERAL MEDICAL PROBLEM





- General


Chief Complaint: Abdominal Pain


Stated Complaint: LOWER LEFT ABD PAIN


Time Seen by Provider: 19 13:45





- Related Data


 Allergies











Allergy/AdvReac Type Severity Reaction Status Date / Time


 


ciprofloxacin [From Cipro] Allergy  Hives Verified 19 13:29


 


ciprofloxacin HCl Allergy  Hives Verified 19 13:29





[From Cipro]     


 


clonidine HCl [From Catapres] Allergy  Blisters Verified 19 13:29


 


hydromorphone Allergy  Itching Verified 19 13:29


 


oxycodone [From Percocet] Allergy  Hives Verified 19 13:29











Home Meds: 


 Home Meds





RX: Levothyroxine Sodium [Synthroid] 25 mcg PO DAILY 01/29/15 [History]


RX: Pantoprazole [ProTONIX Granules***] 40 mg PO BID 01/29/15 [History]


RX: Albuterol Sulfate [Albuterol Sulfate HFA] 2 puff INH Q6H PRN 05/21/15 [

History]


RX: Ondansetron HCl [Zofran] 4 mg PO Q4H PRN 05/21/15 [History]


RX: Loratadine 10 mg PO DAILY 16 [History]


RX: buPROPion [Wellbutrin] 150 mg PO BID 09/10/16 [History]


RX: Furosemide 40 mg PO DAILY PRN 17 [History]


RX: Triamcinolone Acetonide [Kenalog 0.1% Crm] 1 applic TOP TID 17 [

History]


RX: Albuterol/Ipratropium [DuoNeb 3.0-0.5 MG/3 ML] 3 ml IH Q4H PRN 07/10/17 [

History]


RX: Magnesium Oxide [Magnesium] 250 mg PO ASDIRECTED 17 [History]


RX: Sertraline HCl [Zoloft] 50 mg PO DAILY 17 [History]


RX: lamoTRIgine [Lamotrigine] 150 mg PO BEDTIME 17 [History]


RX: traZODone 1 - 2 tab PO BEDTIME PRN 17 [History]


RX: ClonazePAM [KlonoPIN] 0.5 mg PO DAILY 17 [History]


RX: Penciclovir [Denavir 1% Crm] 1.5 gm TP ASDIRECTED 17 [History]


RX: Mometasone Furoate [Elocon 0.1% Crm] 1 applic TOP DAILY 18 [History]


RX: Sucralfate [Carafate] 1 tab PO QIDACANDBED 18 [History]


RX: tiZANidine [Zanaflex] 4 mg PO Q8H PRN 18 [History]











Course





- Vital Signs


Last Recorded V/S: 


 Last Vital Signs











Temp  36.3 C   19 16:00


 


Pulse  80   19 16:00


 


Resp  16   19 16:00


 


BP  106/51 L  19 16:00


 


Pulse Ox  91 L  19 16:00














- Orders/Labs/Meds


Orders: 


 Active Orders 24 hr











 Category Date Time Status


 


 Sodium Chloride 0.9% [Normal Saline] 1,000 ml Med  19 15:15 Active





 IV ASDIRECTED   








 Medication Orders





Sodium Chloride (Normal Saline)  1,000 mls @ 999 mls/hr IV ASDIRECTED ABEL


   Last Admin: 19 16:11  Dose: 999 mls/hr








Labs: 


 Laboratory Tests











  19 Range/Units





  14:07 14:07 14:58 


 


WBC  12.0 H    (4.5-11.0)  K/uL


 


RBC  4.64    (3.30-5.50)  M/uL


 


Hgb  13.9    (12.0-15.0)  g/dL


 


Hct  43.1    (36.0-48.0)  %


 


MCV  93    (80-98)  fL


 


MCH  30    (27-31)  pg


 


MCHC  32    (32-36)  %


 


Plt Count  248    (150-400)  K/uL


 


Neut % (Auto)  78 H    (36-66)  %


 


Lymph % (Auto)  15 L    (24-44)  %


 


Mono % (Auto)  6    (2-6)  %


 


Eos % (Auto)  1 L    (2-4)  %


 


Baso % (Auto)  0    (0-1)  %


 


Sodium   139 L   (140-148)  mmol/L


 


Potassium   4.4   (3.6-5.2)  mmol/L


 


Chloride   103   (100-108)  mmol/L


 


Carbon Dioxide   27   (21-32)  mmol/L


 


Anion Gap   13.4   (5.0-14.0)  mmol/L


 


BUN   15   (7-18)  mg/dL


 


Creatinine   1.0   (0.6-1.0)  mg/dL


 


Est Cr Clr Drug Dosing   44.02   mL/min


 


Estimated GFR (MDRD)   59 L   (>60)  


 


Glucose   82   ()  mg/dL


 


Calcium   9.7   (8.5-10.1)  mg/dL


 


Total Bilirubin   0.5  D   (0.2-1.0)  mg/dL


 


AST   17   (15-37)  U/L


 


ALT   13   (12-78)  U/L


 


Alkaline Phosphatase   81   ()  U/L


 


C-Reactive Protein   3.30 H   (0.0-0.3)  mg/dL


 


Total Protein   7.1   (6.4-8.2)  g/dL


 


Albumin   3.5   (3.4-5.0)  g/dL


 


Globulin   3.6 H   (2.3-3.5)  g/dL


 


Albumin/Globulin Ratio   1.0 L   (1.2-2.2)  


 


Lipase   82   ()  U/L


 


Urine Color    Yellow  


 


Urine Appearance    Clear  


 


Urine pH    6.0  (4.5-8.0)  


 


Ur Specific Gravity    1.020  (1.008-1.030)  


 


Urine Protein    Negative  (NEGATIVE)  mg/dL


 


Urine Glucose (UA)    Normal  (NEGATIVE)  mg/dL


 


Urine Ketones    Negative  (NEGATIVE)  mg/dL


 


Urine Occult Blood    Negative  (NEGATIVE)  


 


Urine Nitrite    Negative  (NEGATIVE)  


 


Urine Bilirubin    Negative  (NEGATIVE)  


 


Urine Urobilinogen    Normal  (NORMAL)  mg/dL


 


Ur Leukocyte Esterase    Small  (NEGATIVE)  


 


Urine RBC    Not seen  (0-5)  


 


Urine WBC    0-5  (0-5)  


 


Ur Epithelial Cells    Moderate  


 


Amorphous Sediment    Not seen  


 


Urine Bacteria    Few  


 


Urine Mucus    Few  











Meds: 


Medications











Generic Name Dose Route Start Last Admin





  Trade Name Freq  PRN Reason Stop Dose Admin


 


Sodium Chloride  1,000 mls @ 999 mls/hr  19 15:15  19 16:11





  Normal Saline  IV   999 mls/hr





  ASDIRECTED ABEL   Administration





     





     





     





     














Discontinued Medications














Generic Name Dose Route Start Last Admin





  Trade Name Freq  PRN Reason Stop Dose Admin


 


Ampicillin Sodium/Sulbactam  50 mls @ 100 mls/hr  19 16:00  19 16:12





  Sodium 1.5 gm/ Sodium Chloride  IV  19 16:29  100 mls/hr





  ONETIME ONE   Administration





     





     





     





     


 


Ketorolac Tromethamine  60 mg  19 13:56  19 14:14





  Toradol  IM  19 13:57  60 mg





  ONETIME ONE   Administration





     





     





     





     














- Re-Assessments/Exams


Free Text/Narrative Re-Assessment/Exam: 





19 16:35


White count returned mildly elevated, CT without contrast confirms sigmoid 

diverticulitis. Patient was given 1.5 mg of IV Unasyn, and will be placed on 

500 mg of metronidazole 3 times a day and Augmentin 875 twice a day for the 

next 7 days. Encourage stool softeners and lots of water, return if worsening 

despite treatment.





Departure





- Departure


Disposition: Home, Self-Care 01


Condition: Fair


Clinical Impression: 


 Diverticulitis








- Discharge Information


Instructions:  Diverticulitis, Easy-to-Read


Referrals: 


PCP,None [Primary Care Provider] - 


Forms:  ED Department Discharge


Care Plan Goals: 


Take antibiotic as prescribed until gone. Drink lots of water, and consider 

stool softeners such as docusate sodium or fiber. Return anytime if worsening 

despite treatment such as fever or increased pain.





- My Orders


Last 24 Hours: 


My Active Orders





19 15:15


Sodium Chloride 0.9% [Normal Saline] 1,000 ml IV ASDIRECTED 














- Assessment/Plan


Last 24 Hours: 


My Active Orders





19 15:15


Sodium Chloride 0.9% [Normal Saline] 1,000 ml IV ASDIRECTED 














<Brenna Platt - Last Filed: 19 16:58>





ED HPI GENERAL MEDICAL PROBLEM





- History of Present Illness


INITIAL COMMENTS - FREE TEXT/NARRATIVE: 


Bear rGey is a 63 year old female who reports with concerns of LLQ pain that 

started 3 days ago. She indicates the pain progressed and was an 8/10 this AM. 

She states she has chronic loose stools, which has been worse in the last 

couple of days. 3 days ago she was incontinent of bowel two times during the 

night hours while sleeping. She states the pain occurs upon positional change, 

when having a BM and while urinating. She thinks she had a colonoscopy about 1 

year ago. She did have a normal CT in January. She states feeling chilled but 

denies fevers. Further symptoms are denied at this time.





Past Medical History


HEENT History: Reports: Allergic Rhinitis, Impaired Vision


Cardiovascular History: Reports: Cardiomyopathy, SOB on Exertion


Respiratory History: Reports: Bronchitis, Recurrent, COPD, Sleep Apnea


Gastrointestinal History: Reports: Bowel Obstruction, Diverticulosis, GERD, 

Hiatal Hernia


Genitourinary History: Reports: UTI, Recurrent, Other (See Below)


Other Genitourinary History: bladder suspension


OB/GYN History: Reports: Pregnancy, Spontaneous 


Musculoskeletal History: Reports: Arthritis, Back Pain, Chronic, Fibromyalgia


Neurological History: Reports: Vertigo


Psychiatric History: Reports: Anxiety, Depression


Endocrine/Metabolic History: Reports: Hypothyroidism, Obesity/BMI 30+


Immunologic History: Reports: None


Oncologic (Cancer) History: Reports: Uterine


Other Oncologic History: complete hysterectomy 2015


Dermatologic History: Reports: Cellulitis


Other Dermatologic History: sore on right lower leg that won't go away





- Infectious Disease History


Infectious Disease History: Reports: Chicken Pox, Measles





- Past Surgical History


HEENT Surgical History: Reports: Other (See Below)


Other HEENT Surgeries/Procedures: biopsy on tongue


GI Surgical History: Reports: Appendectomy, Colon, Colonoscopy, EGD, Hernia 

Repair/Other


Female  Surgical History: Reports: Hysterectomy, Salpingo-Oophorectomy





Social & Family History





- Family History


Family Medical History: Noncontributory





- Tobacco Use


Smoking Status *Q: Current Every Day Smoker


Years of Tobacco use: 40


Packs/Tins Daily: 0.7





- Caffeine Use


Caffeine Use: Reports: Coffee





- Living Situation & Occupation


Living situation: Reports:  (Lives with her 80+ mother, and her sister 

is in the next house. Has 1 son and 2 grandchildren , who she is currently 

estranged from her child and grandchildren.)





ED ROS GENERAL





- Review of Systems


Review Of Systems: See Below


Constitutional: Denies: Fever, Chills


HEENT: Reports: No Symptoms


Respiratory: Reports: Shortness of Breath (Patient stated is a chronic issue)


Cardiovascular: Reports: No Symptoms


GI/Abdominal: Reports: Diarrhea, Distension, Nausea, Other (LLQ pain upon 

palpation).  Denies: Black Stool, Bloody Stool, Constipation, Difficulty 

Swallowing, Vomiting


: Reports: Pain


Musculoskeletal: Reports: No Symptoms


Skin: Reports: No Symptoms


Neurological: Reports: No Symptoms


Psychiatric: Reports: No Symptoms





ED EXAM, GI/ABD





- Physical Exam


Exam: See Below


Exam Limited By: No Limitations


General Appearance: Alert, WD/WN, No Apparent Distress, Mild Distress


Ears: Normal External Exam


Nose: Normal Inspection


Throat/Mouth: Normal Inspection, Normal Lips


Head: Atraumatic, Normocephalic


Neck: Normal Inspection, Supple, Non-Tender, Full Range of Motion


Respiratory/Chest: No Respiratory Distress, Lungs Clear


Cardiovascular: Regular Rate, Rhythm, No Edema


GI/Abdominal Exam: Normal Bowel Sounds, Soft, Tender (Tenderness upon palpation 

of LLQ)


Extremities: Normal Inspection


Neurological: Alert, Oriented


Psychiatric: Normal Affect, Normal Mood





Departure





- Departure


Time of Disposition: 16:58

## 2019-03-08 NOTE — CRLCT
INDICATION:



Left lower abdominal pain 



TECHNIQUE:



CT abdomen and pelvis without contrast.



COMPARISON:



January 14, 2014  



FINDINGS:



Lower chest: Coronary artery calcifications. 



Liver: Unremarkable.  



Spleen: Unremarkable.  



Pancreas: Unremarkable.  



Gallbladder and bile ducts: Unremarkable.  



Adrenal glands: 1.2 cm left adrenal gland adenoma. 



Kidneys: Unremarkable.  No kidney or ureteral stones and no hydronephrosis. 

 



GI tract: Anastomotic suture within the distal small bowel. Colonic 

diverticulosis. There is some fat stranding around the mid sigmoid colon, 

image 113 series 2. No extraluminal air. No evidence for large abscess in 

the confines of a noncontrast exam.  



Vascular structures: Moderate atherosclerotic disease. 



Lymph nodes: Unremarkable.  



Miscellaneous:  Multiple calcifications in the anterior abdomen likely from 

prior ventral herniorrhaphy.



Pelvic Organs:  Status post hysterectomy. 



Bones:  Unremarkable for age.  



IMPRESSION:



Acute sigmoid diverticulitis. No extraluminal air. No evidence for abscess 

within the confines of a noncontrast exam. 



 



Coronary artery disease.



Left adrenal gland adenoma.



Status post hysterectomy and likely ventral herniorrhaphy.



Please note that all CT scans at this facility use dose modulation, 

iterative reconstruction, and/or weight-based dosing when appropriate to 

reduce radiation dose to as low as reasonably achievable.



Dictated by Giselle Kidd MD @ Mar  8 2019  4:21PM



Signed by Dr. Giselle Kidd @ Mar  8 2019  4:33PM

## 2019-06-19 ENCOUNTER — HOSPITAL ENCOUNTER (EMERGENCY)
Dept: HOSPITAL 11 - JP.ED | Age: 64
Discharge: HOME | End: 2019-06-19
Payer: MEDICARE

## 2019-06-19 VITALS — SYSTOLIC BLOOD PRESSURE: 152 MMHG | DIASTOLIC BLOOD PRESSURE: 74 MMHG

## 2019-06-19 DIAGNOSIS — Z88.6: ICD-10-CM

## 2019-06-19 DIAGNOSIS — F32.9: ICD-10-CM

## 2019-06-19 DIAGNOSIS — Z88.8: ICD-10-CM

## 2019-06-19 DIAGNOSIS — S83.91XA: Primary | ICD-10-CM

## 2019-06-19 DIAGNOSIS — Z88.1: ICD-10-CM

## 2019-06-19 DIAGNOSIS — F41.9: ICD-10-CM

## 2019-06-19 DIAGNOSIS — J44.9: ICD-10-CM
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
## 2019-06-19 DIAGNOSIS — Z79.899: ICD-10-CM

## 2019-06-19 DIAGNOSIS — E03.9: ICD-10-CM

## 2019-06-19 DIAGNOSIS — F17.210: ICD-10-CM

## 2019-06-19 PROCEDURE — 73562 X-RAY EXAM OF KNEE 3: CPT

## 2019-06-19 PROCEDURE — 96372 THER/PROPH/DIAG INJ SC/IM: CPT

## 2019-06-19 PROCEDURE — 99283 EMERGENCY DEPT VISIT LOW MDM: CPT

## 2019-06-19 NOTE — EDM.PDOC
ED HPI GENERAL MEDICAL PROBLEM





- General


Chief Complaint: Lower Extremity Injury/Pain


Stated Complaint: VIA NORTH


Time Seen by Provider: 19 16:10


Source of Information: Reports: Patient


History Limitations: Reports: No Limitations





- History of Present Illness


INITIAL COMMENTS - FREE TEXT/NARRATIVE: 





63-year-old female was on a riding lawnmower going up a hill, when she 

accidentally hit the accelerator and the lawn more flipped upright and then 

over onto her right leg. She has significant pain of the right knee and she 

felt and heard a snap. No other significant injury. She was given 50 g of 

fentanyl in route which didn't do anything for her.


Onset: Sudden


Duration: Hour(s):


Location: Reports: Lower Extremity, Right (within the last hour)


Associated Symptoms: Reports: Other (Slight bruising of the left knee as well 

but no significant pain)


  ** Right Knee


Pain Score (Numeric/FACES): 8





- Related Data


 Allergies











Allergy/AdvReac Type Severity Reaction Status Date / Time


 


ciprofloxacin [From Cipro] Allergy  Hives Verified 19 16:08


 


clonidine HCl [From Catapres] Allergy  Blisters Verified 19 16:08


 


oxycodone [From Percocet] Allergy  Hives Verified 19 16:08











Home Meds: 


 Home Meds





Levothyroxine Sodium [Synthroid] 25 mcg PO DAILY 01/29/15 [History]


Pantoprazole [ProTONIX Granules***] 40 mg PO BID 01/29/15 [History]


Albuterol Sulfate [Albuterol Sulfate HFA] 2 puff INH Q6H PRN 05/21/15 [History]


Ondansetron HCl [Zofran] 4 mg PO Q4H PRN 05/21/15 [History]


Loratadine 10 mg PO DAILY 16 [History]


buPROPion [Wellbutrin] 150 mg PO BID 09/10/16 [History]


Furosemide 40 mg PO DAILY PRN 17 [History]


Triamcinolone Acetonide [Kenalog 0.1% Crm] 1 applic TOP TID 17 [History]


Albuterol/Ipratropium [DuoNeb 3.0-0.5 MG/3 ML] 3 ml IH Q4H PRN 07/10/17 [History

]


Magnesium Oxide [Magnesium] 250 mg PO ASDIRECTED 17 [History]


Sertraline HCl [Zoloft] 50 mg PO DAILY 17 [History]


lamoTRIgine [Lamotrigine] 150 mg PO BEDTIME 17 [History]


traZODone 1 - 2 tab PO BEDTIME PRN 17 [History]


ClonazePAM [KlonoPIN] 0.5 mg PO DAILY 17 [History]


Penciclovir [Denavir 1% Crm] 1.5 gm TP ASDIRECTED 17 [History]


Mometasone Furoate [Elocon 0.1% Crm] 1 applic TOP DAILY 18 [History]


Sucralfate [Carafate] 1 tab PO QIDACANDBED 18 [History]


tiZANidine [Zanaflex] 4 mg PO Q8H PRN 18 [History]











Past Medical History


HEENT History: Reports: Allergic Rhinitis, Impaired Vision


Cardiovascular History: Reports: Cardiomyopathy, SOB on Exertion


Respiratory History: Reports: Bronchitis, Recurrent, COPD, Sleep Apnea


Gastrointestinal History: Reports: Bowel Obstruction, Diverticulosis, GERD, 

Hiatal Hernia


Genitourinary History: Reports: UTI, Recurrent, Other (See Below)


Other Genitourinary History: bladder suspension


OB/GYN History: Reports: Pregnancy, Spontaneous 


Musculoskeletal History: Reports: Arthritis, Back Pain, Chronic, Fibromyalgia


Neurological History: Reports: Vertigo


Psychiatric History: Reports: Anxiety, Depression, Suicidal Ideation


Endocrine/Metabolic History: Reports: Hypothyroidism, Obesity/BMI 30+


Immunologic History: Reports: None


Oncologic (Cancer) History: Reports: Uterine


Other Oncologic History: complete hysterectomy 2015


Dermatologic History: Reports: Cellulitis


Other Dermatologic History: sore on right lower leg that won't go away





- Infectious Disease History


Infectious Disease History: Reports: Chicken Pox, Measles





- Past Surgical History


HEENT Surgical History: Reports: Other (See Below)


Other HEENT Surgeries/Procedures: biopsy on tongue


GI Surgical History: Reports: Appendectomy, Colon, Colonoscopy, EGD, Hernia 

Repair/Other


Female  Surgical History: Reports: Hysterectomy, Salpingo-Oophorectomy





Social & Family History





- Family History


Family Medical History: Noncontributory





- Tobacco Use


Smoking Status *Q: Current Every Day Smoker


Years of Tobacco use: 45


Packs/Tins Daily: 1





- Caffeine Use


Caffeine Use: Reports: Coffee, Soda





- Recreational Drug Use


Recreational Drug Use: No





- Living Situation & Occupation


Living situation: Reports:  (Lives with her 80+ mother, and her sister 

is in the next house. Has 1 son and 2 grandchildren , who she is currently 

estranged from her child and grandchildren.)





Review of Systems





- Review of Systems


Review Of Systems: See Below


Constitutional: Denies: Fever


Respiratory: Reports: No Symptoms


Cardiovascular: Reports: No Symptoms


GI/Abdominal: Reports: No Symptoms


Skin: Reports: Bruising, Other (A few abrasions on the lower extremities, some 

chronic vascular changes and excoriations)


Psychiatric: Reports: No Symptoms





ED EXAM, GENERAL





- Physical Exam


Exam: See Below


Exam Limited By: No Limitations


General Appearance: Alert, No Apparent Distress (Uncomfortable but not 

distressed)


Head: Atraumatic


Neck: Non-Tender


Respiratory/Chest: No Respiratory Distress


GI/Abdominal: Non-Tender


Extremities: Other (Exam is otherwise limited the lower extremities. There is a 

slight swelling around the anterior aspect of the right knee compared to the 

left, no patellar palpation tenderness. Most the palpation tenderness is across 

the lateral knee and on the proximal fibula. There is increased pain with 

flexion of the knee even passively.)





Course





- Vital Signs


Last Recorded V/S: 


 Last Vital Signs











Temp  96.3 F   19 16:06


 


Pulse  78   19 16:06


 


Resp  20   19 16:06


 


BP  152/74 H  19 16:06


 


Pulse Ox  78 L  19 16:06














- Orders/Labs/Meds


Orders: 


 Active Orders 24 hr











 Category Date Time Status


 


 DME for Discharge [COMM] Stat Oth  19 16:47 Ordered











Meds: 


Medications














Discontinued Medications














Generic Name Dose Route Start Last Admin





  Trade Name Freq  PRN Reason Stop Dose Admin


 


Ketorolac Tromethamine  60 mg  19 16:16  19 16:22





  Toradol  IM  19 16:17  60 mg





  ONETIME ONE   Administration





     





     





     





     














- Re-Assessments/Exams


Free Text/Narrative Re-Assessment/Exam: 





19 16:31


An x-ray of the right knee was obtained.


19 16:46


X-ray shows no fracture. A six-inch Ace wrap was applied to the knee and she 

attempted to bear weight but it was too sore. Crutches were given, and she is 

can wear the Ace wrap for support for the next several days and recheck early 

next week with Dr. Oro if not improving satisfactorily. She was given 20 

hydrocodone for pain control not responding to anti-inflammatories.








Departure





- Departure


Time of Disposition: 17:18


Disposition: Home, Self-Care 01


Clinical Impression: 


Right knee sprain


Qualifiers:


 Encounter type: initial encounter Involved ligament of knee: unspecified 

ligament Qualified Code(s): S83.91XA - Sprain of unspecified site of right knee

, initial encounter








- Discharge Information


Instructions:  Knee Sprain, Adult, Easy-to-Read


Referrals: 


PCP,None [Primary Care Provider] - 


Forms:  ED Department Discharge


Care Plan Goals: 


Wrap for comfort and use crutches the next several days as you increase 

activity as tolerated. Take ibuprofen and return naproxen for pain and add 

stronger pain medication as directed if needed. Recheck with Dr. Oro next 

week, call Monday for an appointment if still unable to ambulate without 

crutches.





- My Orders


Last 24 Hours: 


My Active Orders





19 16:47


DME for Discharge [COMM] Stat 














- Assessment/Plan


Last 24 Hours: 


My Active Orders





19 16:47


DME for Discharge [COMM] Stat

## 2019-06-19 NOTE — CRLCR
Indication:



Injury and pain



Technique:



Right knee 3 views



Comparison:



None



Findings/Impression:



Bones: Alignment is normal. No fractures or bone lesions.  



Joint spaces: Joint effusion is suspected. There is moderate narrowing of 

the knee joint medial compartment. No other degenerative changes. 



Soft tissues: Unremarkable.



Dictated by Denis Mcgowan MD @ 6/19/2019 5:19:27 PM



Dictated by: Denis Mcgowan MD @ 06/19/2019 17:19:31



(Electronically Signed)

## 2019-07-18 ENCOUNTER — HOSPITAL ENCOUNTER (EMERGENCY)
Dept: HOSPITAL 11 - JP.ED | Age: 64
LOS: 1 days | Discharge: HOME | End: 2019-07-19
Payer: MEDICARE

## 2019-07-18 VITALS — HEART RATE: 93 BPM | SYSTOLIC BLOOD PRESSURE: 139 MMHG | DIASTOLIC BLOOD PRESSURE: 74 MMHG

## 2019-07-18 DIAGNOSIS — Z88.8: ICD-10-CM

## 2019-07-18 DIAGNOSIS — F17.210: ICD-10-CM

## 2019-07-18 DIAGNOSIS — F41.9: ICD-10-CM

## 2019-07-18 DIAGNOSIS — E03.9: ICD-10-CM

## 2019-07-18 DIAGNOSIS — B96.89: ICD-10-CM

## 2019-07-18 DIAGNOSIS — Z88.1: ICD-10-CM

## 2019-07-18 DIAGNOSIS — K21.9: ICD-10-CM

## 2019-07-18 DIAGNOSIS — L03.115: Primary | ICD-10-CM

## 2019-07-18 DIAGNOSIS — J44.9: ICD-10-CM

## 2019-07-18 DIAGNOSIS — Z79.899: ICD-10-CM

## 2019-07-18 DIAGNOSIS — F32.9: ICD-10-CM

## 2019-07-19 NOTE — EDM.PDOC
ED HPI GENERAL MEDICAL PROBLEM





- General


Chief Complaint: Lower Extremity Injury/Pain


Stated Complaint: RIGHT LEG RED SWOLLEN AND SORE


Time Seen by Provider: 19 00:06


Source of Information: Reports: Patient, RN Notes Reviewed


History Limitations: Reports: No Limitations





- History of Present Illness


INITIAL COMMENTS - FREE TEXT/NARRATIVE: 





63-year-old female presents emergency department today complaint red swollen 

right leg, she does have a small wound on this leg she's noticed the redness 

has spread over the last 24 hours has not had any fevers


  ** Right Lower Leg


Pain Score (Numeric/FACES): 4





- Related Data


 Allergies











Allergy/AdvReac Type Severity Reaction Status Date / Time


 


ciprofloxacin [From Cipro] Allergy  Hives Verified 19 23:43


 


clonidine HCl [From Catapres] Allergy  Blisters Verified 19 23:43











Home Meds: 


 Home Meds





Levothyroxine Sodium [Synthroid] 25 mcg PO DAILY 01/29/15 [History]


Pantoprazole [ProTONIX Granules***] 40 mg PO BID 01/29/15 [History]


Albuterol Sulfate [Albuterol Sulfate HFA] 2 puff INH Q6H PRN 05/21/15 [History]


Ondansetron HCl [Zofran] 4 mg PO Q4H PRN 05/21/15 [History]


Loratadine 10 mg PO DAILY 16 [History]


buPROPion [Wellbutrin] 150 mg PO BID 09/10/16 [History]


Furosemide 40 mg PO DAILY PRN 17 [History]


Triamcinolone Acetonide [Kenalog 0.1% Crm] 1 applic TOP TID 17 [History]


Albuterol/Ipratropium [DuoNeb 3.0-0.5 MG/3 ML] 3 ml IH Q4H PRN 07/10/17 [History

]


Magnesium Oxide [Magnesium] 250 mg PO ASDIRECTED 17 [History]


Sertraline HCl [Zoloft] 50 mg PO DAILY 17 [History]


lamoTRIgine [Lamotrigine] 150 mg PO DAILY 17 [History]


traZODone 1 - 2 tab PO BEDTIME PRN 17 [History]


ClonazePAM [KlonoPIN] 0.5 mg PO ASDIRECTED PRN 17 [History]


Penciclovir [Denavir 1% Crm] 1.5 gm TP ASDIRECTED 17 [History]


Mometasone Furoate [Elocon 0.1% Crm] 1 applic TOP DAILY 18 [History]


Sucralfate [Carafate] 1 tab PO QIDACANDBED 18 [History]


tiZANidine [Zanaflex] 4 mg PO Q8H PRN 18 [History]


Acetaminophen/HYDROcodone [Norco 325-5 MG] 1 tab PO Q6H PRN 19 [History]


Hydrocodone/Acetaminophen [Norco 5-325 Tablet] 1 each PO Q6HR PRN #28 tablet  [Rx]











Past Medical History


HEENT History: Reports: Allergic Rhinitis, Impaired Vision


Cardiovascular History: Reports: Cardiomyopathy, SOB on Exertion


Respiratory History: Reports: Bronchitis, Recurrent, COPD, Sleep Apnea


Gastrointestinal History: Reports: Bowel Obstruction, Diverticulosis, GERD, 

Hiatal Hernia


Genitourinary History: Reports: UTI, Recurrent, Other (See Below)


Other Genitourinary History: bladder suspension


OB/GYN History: Reports: Pregnancy, Spontaneous 


Musculoskeletal History: Reports: Arthritis, Back Pain, Chronic, Fibromyalgia


Other Musculoskeletal History: right knee pain


Neurological History: Reports: Vertigo


Psychiatric History: Reports: Anxiety, Depression, Suicidal Ideation


Endocrine/Metabolic History: Reports: Hypothyroidism, Obesity/BMI 30+


Hematologic History: Reports: None


Immunologic History: Reports: None


Oncologic (Cancer) History: Reports: Cervix, Uterine


Other Oncologic History: complete hysterectomy 2015


Dermatologic History: Reports: Cellulitis


Other Dermatologic History: sore on right lower leg that won't go away





- Infectious Disease History


Infectious Disease History: Reports: Helicobacter Pylori, Measles, Mumps





- Past Surgical History


HEENT Surgical History: Reports: Other (See Below)


Other HEENT Surgeries/Procedures: biopsy on tongue


GI Surgical History: Reports: Appendectomy, Colon, Colonoscopy, EGD, Hernia 

Repair/Other


Female  Surgical History: Reports: Hysterectomy, Salpingo-Oophorectomy


Musculoskeletal Surgical History: Reports: None


Oncologic Surgical History: Reports: None





Social & Family History





- Family History


Family Medical History: Noncontributory





- Tobacco Use


Smoking Status *Q: Current Every Day Smoker


Years of Tobacco use: 45


Packs/Tins Daily: 5





- Caffeine Use


Caffeine Use: Reports: Coffee





- Recreational Drug Use


Recreational Drug Use: No





- Living Situation & Occupation


Living situation: Reports:  (Lives with her 80+ mother, and her sister 

is in the next house. Has 1 son and 2 grandchildren , who she is currently 

estranged from her child and grandchildren.)





Review of Systems





- Review of Systems


Review Of Systems: See Below


Constitutional: Reports: No Symptoms


Respiratory: Reports: No Symptoms


Cardiovascular: Reports: No Symptoms


Skin: Reports: Pallor, Rash, Erythema, Wound





ED EXAM, GENERAL





- Physical Exam


Exam: See Below


Free Text/Narrative:: 





Examination of the right leg ado appreciated erythematous area approximately 8 

cm x 5 cm it is located around her facial wound. It is warm to the touch and 

tender to the touch, pedal pulse is +2 I don't appreciate any significant edema 

in the calf


Exam Limited By: No Limitations


General Appearance: Alert, WD/WN, No Apparent Distress





Course





- Vital Signs


Last Recorded V/S: 





 Last Vital Signs











Temp  97.6 F   19 23:51


 


Pulse  93   19 23:51


 


Resp  18   19 23:51


 


BP  139/74   19 23:51


 


Pulse Ox  91 L  19 23:51














Departure





- Departure


Time of Disposition: 00:13


Disposition: Home, Self-Care 01


Condition: Fair


Clinical Impression: 


 Cellulitis of right lower leg








- Discharge Information


Instructions:  Cellulitis, Adult


Referrals: 


Gabriel Schmidt MD [Primary Care Provider] - 


Additional Instructions: 


Take full course of antibiotics,   Please followup with your primary care 

provider in 3-5 days if not better, please call return to the emergency 

department with worsening of symptoms.





- Assessment/Plan


Plan: 





Assessment





Acuity = acute





Site and laterality = right lower extremity cellulitis  





Etiology  = secondary to breaking the skin bacterial cause  





Manifestations = none  





Location of injury =  Home





Lab values = none  





Plan


Elected to treat empirically Keflex 500 mg by mouth 4 times a day 7 days have 

her follow up with primary care next week  

















 This note was dictated using dragon voice recognition software please call 

with any questions on syntax or grammar.

## 2019-10-24 ENCOUNTER — HOSPITAL ENCOUNTER (EMERGENCY)
Dept: HOSPITAL 11 - JP.ED | Age: 64
Discharge: HOME | End: 2019-10-24
Payer: MEDICARE

## 2019-10-24 VITALS — SYSTOLIC BLOOD PRESSURE: 114 MMHG | DIASTOLIC BLOOD PRESSURE: 59 MMHG | HEART RATE: 84 BPM

## 2019-10-24 DIAGNOSIS — F41.9: ICD-10-CM

## 2019-10-24 DIAGNOSIS — E03.9: ICD-10-CM

## 2019-10-24 DIAGNOSIS — Z88.8: ICD-10-CM

## 2019-10-24 DIAGNOSIS — J44.9: ICD-10-CM

## 2019-10-24 DIAGNOSIS — F32.9: ICD-10-CM

## 2019-10-24 DIAGNOSIS — Z79.899: ICD-10-CM

## 2019-10-24 DIAGNOSIS — Z79.890: ICD-10-CM

## 2019-10-24 DIAGNOSIS — L29.9: Primary | ICD-10-CM

## 2019-10-24 DIAGNOSIS — Z88.1: ICD-10-CM

## 2019-10-24 DIAGNOSIS — E66.9: ICD-10-CM

## 2019-10-24 DIAGNOSIS — K21.9: ICD-10-CM

## 2019-10-24 PROCEDURE — 99282 EMERGENCY DEPT VISIT SF MDM: CPT

## 2019-10-24 PROCEDURE — 99284 EMERGENCY DEPT VISIT MOD MDM: CPT

## 2019-10-24 PROCEDURE — 96372 THER/PROPH/DIAG INJ SC/IM: CPT

## 2019-10-24 NOTE — EDM.PDOC
ED HPI GENERAL MEDICAL PROBLEM





- General


Chief Complaint: Skin Complaint


Stated Complaint: RIGHT LEG ITCHING WITH PAIN


Time Seen by Provider: 10/24/19 22:10


Source of Information: Reports: Patient


History Limitations: Reports: No Limitations





- History of Present Illness


INITIAL COMMENTS - FREE TEXT/NARRATIVE: 





65 yo female here with itching to the R leg beneath a dressing that was applied 

2 days ago by Dr. Christine after a small skin graft was applied just above 

her ankle. She has not taken anything for the itching. Dressing has a rubbery 

texture. 


Onset: Today


Onset Date: 10/24/19


Duration: Hour(s):, Getting Worse


Location: Reports: Lower Extremity, Right


Quality: Reports: Other (pruritis)


Severity: Severe


Improves with: Reports: None


Worsens with: Reports: Other (time)


Context: Reports: Other (see HPI)


Associated Symptoms: Reports: No Other Symptoms


Treatments PTA: Reports: Other (see below) (none)





- Related Data


 Allergies











Allergy/AdvReac Type Severity Reaction Status Date / Time


 


ciprofloxacin [From Cipro] Allergy  Hives Verified 19 23:43


 


clonidine HCl [From Catapres] Allergy  Blisters Verified 19 23:43











Home Meds: 


 Home Meds





Levothyroxine Sodium [Synthroid] 25 mcg PO DAILY 01/29/15 [History]


Pantoprazole [ProTONIX Granules***] 40 mg PO BID 01/29/15 [History]


Albuterol Sulfate [Albuterol Sulfate HFA] 2 puff INH Q6H PRN 05/21/15 [History]


Ondansetron HCl [Zofran] 4 mg PO Q4H PRN 05/21/15 [History]


Loratadine 10 mg PO DAILY 16 [History]


buPROPion [Wellbutrin] 150 mg PO BID 09/10/16 [History]


Furosemide 40 mg PO DAILY PRN 17 [History]


Triamcinolone Acetonide [Kenalog 0.1% Crm] 1 applic TOP TID 17 [History]


Albuterol/Ipratropium [DuoNeb 3.0-0.5 MG/3 ML] 3 ml IH Q4H PRN 07/10/17 [History

]


Magnesium Oxide [Magnesium] 250 mg PO ASDIRECTED 17 [History]


Sertraline HCl [Zoloft] 50 mg PO DAILY 17 [History]


lamoTRIgine [Lamotrigine] 150 mg PO DAILY 17 [History]


traZODone 1 - 2 tab PO BEDTIME PRN 17 [History]


ClonazePAM [KlonoPIN] 0.5 mg PO ASDIRECTED PRN 17 [History]


Penciclovir [Denavir 1% Crm] 1.5 gm TP ASDIRECTED 17 [History]


Mometasone Furoate [Elocon 0.1% Crm] 1 applic TOP DAILY 18 [History]


tiZANidine [Zanaflex] 4 mg PO Q8H PRN 18 [History]


Acetaminophen/HYDROcodone [Norco 325-5 MG] 1 tab PO Q6H PRN 19 [History]


Hydrocodone/Acetaminophen [Norco 5-325 Tablet] 1 each PO Q6HR PRN #28 tablet  [Rx]


cephALEXin [Keflex] 500 mg PO QID 19 [History]











Past Medical History


HEENT History: Reports: Allergic Rhinitis, Impaired Vision


Cardiovascular History: Reports: Cardiomyopathy, SOB on Exertion


Respiratory History: Reports: Bronchitis, Recurrent, COPD, Sleep Apnea


Gastrointestinal History: Reports: Bowel Obstruction, Diverticulosis, GERD, 

Hiatal Hernia


Genitourinary History: Reports: UTI, Recurrent, Other (See Below)


Other Genitourinary History: bladder suspension


OB/GYN History: Reports: Pregnancy, Spontaneous 


Musculoskeletal History: Reports: Arthritis, Back Pain, Chronic, Fibromyalgia


Other Musculoskeletal History: right knee pain


Neurological History: Reports: Vertigo


Psychiatric History: Reports: Anxiety, Depression, Suicidal Ideation


Endocrine/Metabolic History: Reports: Hypothyroidism, Obesity/BMI 30+


Hematologic History: Reports: None


Immunologic History: Reports: None


Oncologic (Cancer) History: Reports: Cervix, Uterine


Other Oncologic History: complete hysterectomy 2015


Dermatologic History: Reports: Cellulitis


Other Dermatologic History: sore on right lower leg that won't go away





- Infectious Disease History


Infectious Disease History: Reports: Helicobacter Pylori, Measles, Mumps





- Past Surgical History


HEENT Surgical History: Reports: Other (See Below)


Other HEENT Surgeries/Procedures: biopsy on tongue


GI Surgical History: Reports: Appendectomy, Colon, Colonoscopy, EGD, Hernia 

Repair/Other


Female  Surgical History: Reports: Hysterectomy, Salpingo-Oophorectomy


Musculoskeletal Surgical History: Reports: None





Social & Family History





- Family History


Family Medical History: Noncontributory





- Caffeine Use


Caffeine Use: Reports: Soda





- Living Situation & Occupation


Living situation: Reports:  (Lives with her 80+ mother, and her sister 

is in the next house. Has 1 son and 2 grandchildren , who she is currently 

estranged from her child and grandchildren.)





ED ROS GENERAL





- Review of Systems


Review Of Systems: ROS reveals no pertinent complaints other than HPI.


Constitutional: Reports: No Symptoms


Skin: Reports: Pruritis (just to skin beneath her dressing.)





ED EXAM, SKIN/RASH


Exam: See Below


Exam Limited By: No Limitations


General Appearance: Alert, WD/WN, Mild Distress


Respiratory/Chest: No Respiratory Distress, Lungs Clear, Normal Breath Sounds, 

No Accessory Muscle Use


Cardiovascular: Regular Rate, Rhythm


Skin: Warm, Dry, Intact, No Rash, Erythema (Very slight erythema beneath her 

dressing. ).  No: Increased Warmth, Lymphangitis, Rash


Location, Skin: Lower Extremity, Right (below the knee)


Characteristics: Confluent, Erythematous (very slight)


Associated features: No: Warmth, Tenderness, Swelling, Induration, Scaling, 

Lymphangitis





ED SKIN PROCEDURES





- Additional/Other Procedure(s)


Other (Free Text) Procedure(s): 





Current dressing removed and a new one with only cotton in contact with her 

skin. 





Course





- Vital Signs


Last Recorded V/S: 


 Last Vital Signs











Temp  36.4 C   10/24/19 22:01


 


Pulse  84   10/24/19 22:01


 


Resp  16   10/24/19 22:01


 


BP  114/59 L  10/24/19 22:01


 


Pulse Ox  93 L  10/24/19 22:01














- Orders/Labs/Meds


Meds: 


Medications














Discontinued Medications














Generic Name Dose Route Start Last Admin





  Trade Name Freq  PRN Reason Stop Dose Admin


 


Hydroxyzine HCl  75 mg  10/24/19 22:13  





  Vistaril  IM  10/24/19 22:14  





  ONETIME ONE   





     





     





     





     














Departure





- Departure


Time of Disposition: 22:45


Disposition: Home, Self-Care 01


Condition: Good


Clinical Impression: 


 Localized pruritus








- Discharge Information


*PRESCRIPTION DRUG MONITORING PROGRAM REVIEWED*: No


*COPY OF PRESCRIPTION DRUG MONITORING REPORT IN PATIENT KENISHA: No


Instructions:  Contact Dermatitis, Easy-to-Read


Referrals: 


Garbiel Schmidt MD [Primary Care Provider] - 


Forms:  ED Department Discharge


Additional Instructions: 


Take diphenhydramine 50 mg every 4 hrs as needed for itching starting after 

0230h tonight. Recheck with Dr. Christine as needed for this problem.

## 2019-11-02 ENCOUNTER — HOSPITAL ENCOUNTER (EMERGENCY)
Dept: HOSPITAL 11 - JP.ED | Age: 64
Discharge: HOME | End: 2019-11-02
Payer: MEDICARE

## 2019-11-02 VITALS — SYSTOLIC BLOOD PRESSURE: 129 MMHG | DIASTOLIC BLOOD PRESSURE: 74 MMHG | HEART RATE: 68 BPM

## 2019-11-02 DIAGNOSIS — Z88.1: ICD-10-CM

## 2019-11-02 DIAGNOSIS — Z79.899: ICD-10-CM

## 2019-11-02 DIAGNOSIS — E03.9: ICD-10-CM

## 2019-11-02 DIAGNOSIS — Z88.8: ICD-10-CM

## 2019-11-02 DIAGNOSIS — K21.9: ICD-10-CM

## 2019-11-02 DIAGNOSIS — F32.9: ICD-10-CM

## 2019-11-02 DIAGNOSIS — L03.011: Primary | ICD-10-CM

## 2019-11-02 DIAGNOSIS — Z79.890: ICD-10-CM

## 2019-11-02 DIAGNOSIS — J44.9: ICD-10-CM

## 2019-11-02 NOTE — EDM.PDOC
ED HPI GENERAL MEDICAL PROBLEM





- General


Chief Complaint: Skin Complaint


Stated Complaint: RIGHT PINKY BLISTER


Time Seen by Provider: 19 17:13


Source of Information: Reports: Patient, RN Notes Reviewed


History Limitations: Reports: No Limitations





- History of Present Illness


INITIAL COMMENTS - FREE TEXT/NARRATIVE: 





64-year-old female presents emergency department today with a small ulcer on 

digit #5 right hand states been ongoing for 24 hours she did get some thick 

purulent drainage out of there the redness is spreading no fevers





- Related Data


 Allergies











Allergy/AdvReac Type Severity Reaction Status Date / Time


 


ciprofloxacin [From Cipro] Allergy  Hives Verified 19 23:43


 


clonidine HCl [From Catapres] Allergy  Blisters Verified 19 23:43











Home Meds: 


 Home Meds





Levothyroxine Sodium [Synthroid] 25 mcg PO DAILY 01/29/15 [History]


Pantoprazole [ProTONIX Granules***] 40 mg PO BID 01/29/15 [History]


Albuterol Sulfate [Albuterol Sulfate HFA] 2 puff INH Q6H PRN 05/21/15 [History]


Ondansetron HCl [Zofran] 4 mg PO Q4H PRN 05/21/15 [History]


Loratadine 10 mg PO DAILY 16 [History]


buPROPion [Wellbutrin] 150 mg PO BID 09/10/16 [History]


Furosemide 40 mg PO DAILY PRN 17 [History]


Triamcinolone Acetonide [Kenalog 0.1% Crm] 1 applic TOP TID 17 [History]


Albuterol/Ipratropium [DuoNeb 3.0-0.5 MG/3 ML] 3 ml IH Q4H PRN 07/10/17 [History

]


Magnesium Oxide [Magnesium] 250 mg PO ASDIRECTED 17 [History]


Sertraline HCl [Zoloft] 50 mg PO DAILY 17 [History]


lamoTRIgine [Lamotrigine] 150 mg PO DAILY 17 [History]


traZODone 1 - 2 tab PO BEDTIME PRN 17 [History]


ClonazePAM [KlonoPIN] 0.5 mg PO ASDIRECTED PRN 17 [History]


Penciclovir [Denavir 1% Crm] 1.5 gm TP ASDIRECTED 17 [History]


Mometasone Furoate [Elocon 0.1% Crm] 1 applic TOP DAILY 18 [History]


tiZANidine [Zanaflex] 4 mg PO Q8H PRN 18 [History]


Acetaminophen/HYDROcodone [Norco 325-5 MG] 1 tab PO Q6H PRN 19 [History]


Hydrocodone/Acetaminophen [Norco 5-325 Tablet] 1 each PO Q6HR PRN #28 tablet  [Rx]


cephALEXin [Keflex] 500 mg PO QID 19 [History]











Past Medical History


HEENT History: Reports: Allergic Rhinitis, Impaired Vision


Cardiovascular History: Reports: Cardiomyopathy, SOB on Exertion


Respiratory History: Reports: Bronchitis, Recurrent, COPD, Sleep Apnea


Gastrointestinal History: Reports: Bowel Obstruction, Diverticulosis, GERD, 

Hiatal Hernia


Genitourinary History: Reports: UTI, Recurrent, Other (See Below)


Other Genitourinary History: bladder suspension


OB/GYN History: Reports: Pregnancy, Spontaneous 


Musculoskeletal History: Reports: Arthritis, Back Pain, Chronic, Fibromyalgia


Other Musculoskeletal History: right knee pain


Neurological History: Reports: Vertigo


Psychiatric History: Reports: Anxiety, Depression, Suicidal Ideation


Endocrine/Metabolic History: Reports: Hypothyroidism, Obesity/BMI 30+


Hematologic History: Reports: None


Immunologic History: Reports: None


Oncologic (Cancer) History: Reports: Cervix, Uterine


Other Oncologic History: complete hysterectomy 2015


Dermatologic History: Reports: Cellulitis


Other Dermatologic History: sore on right lower leg that won't go away





- Infectious Disease History


Infectious Disease History: Reports: Helicobacter Pylori, Measles, Mumps





- Past Surgical History


HEENT Surgical History: Reports: Other (See Below)


Other HEENT Surgeries/Procedures: biopsy on tongue


GI Surgical History: Reports: Appendectomy, Colon, Colonoscopy, EGD, Hernia 

Repair/Other


Female  Surgical History: Reports: Hysterectomy, Salpingo-Oophorectomy


Musculoskeletal Surgical History: Reports: None





Social & Family History





- Family History


Family Medical History: Noncontributory





- Caffeine Use


Caffeine Use: Reports: Coffee





- Recreational Drug Use


Recreational Drug Use: No





- Living Situation & Occupation


Living situation: Reports:  (Lives with her 80+ mother, and her sister 

is in the next house. Has 1 son and 2 grandchildren , who she is currently 

estranged from her child and grandchildren.)





ED ROS GENERAL





- Review of Systems


Review Of Systems: See Below


Constitutional: Reports: No Symptoms


Skin: Reports: Pallor, Erythema, Wound





ED EXAM, SKIN/RASH


Exam: See Below


Text/Narrative:: 





Examination of the right hand there is a small ulcer near the PIP joint digit #

5 right hand there is some erythema around that it is warm to the touch it is 

tender to the touch





Course





- Vital Signs


Last Recorded V/S: 





 Last Vital Signs











Temp  97.2 F   19 17:11


 


Pulse  68   19 17:11


 


Resp  16   19 17:11


 


BP  129/74   19 17:11


 


Pulse Ox  96   19 17:11














Departure





- Departure


Time of Disposition: 17:19


Disposition: Home, Self-Care 01


Condition: Fair


Clinical Impression: 


 Cellulitis of right little finger








- Discharge Information


Referrals: 


Gabriel Schmidt MD [Primary Care Provider] - 


Additional Instructions: 


Take full course of antibiotics,  Please followup with your primary care 

provider in 5-7 days if not better, please call return to the emergency 

department with worsening of symptoms.





- Assessment/Plan


Plan: 





Assessment





Acuity = acute





Site and laterality = cellulitis digit #5 right hand  





Etiology  = probable bacterial cause  





Manifestations = none  





Location of injury =  Home





Lab values = none  





Plan


Elected to treat empirically Keflex 500 mg by mouth 4 times a day 10 days 

follow-up primary care in 5-7 days if no improvement  

















 This note was dictated using dragon voice recognition software please call 

with any questions on syntax or grammar.

## 2019-12-25 ENCOUNTER — HOSPITAL ENCOUNTER (EMERGENCY)
Dept: HOSPITAL 11 - JP.ED | Age: 64
LOS: 1 days | Discharge: HOME | End: 2019-12-26
Payer: MEDICARE

## 2019-12-25 DIAGNOSIS — E03.9: ICD-10-CM

## 2019-12-25 DIAGNOSIS — M19.90: ICD-10-CM

## 2019-12-25 DIAGNOSIS — Z79.899: ICD-10-CM

## 2019-12-25 DIAGNOSIS — Z88.1: ICD-10-CM

## 2019-12-25 DIAGNOSIS — K21.9: ICD-10-CM

## 2019-12-25 DIAGNOSIS — Z88.8: ICD-10-CM

## 2019-12-25 DIAGNOSIS — L97.912: Primary | ICD-10-CM

## 2019-12-25 DIAGNOSIS — F32.9: ICD-10-CM

## 2019-12-25 DIAGNOSIS — J44.9: ICD-10-CM

## 2019-12-25 DIAGNOSIS — F41.9: ICD-10-CM

## 2019-12-25 DIAGNOSIS — F17.210: ICD-10-CM

## 2019-12-25 DIAGNOSIS — E66.9: ICD-10-CM

## 2019-12-25 PROCEDURE — 96375 TX/PRO/DX INJ NEW DRUG ADDON: CPT

## 2019-12-25 PROCEDURE — 99282 EMERGENCY DEPT VISIT SF MDM: CPT

## 2019-12-25 PROCEDURE — 99284 EMERGENCY DEPT VISIT MOD MDM: CPT

## 2019-12-25 PROCEDURE — 96374 THER/PROPH/DIAG INJ IV PUSH: CPT

## 2019-12-26 VITALS — HEART RATE: 75 BPM | SYSTOLIC BLOOD PRESSURE: 143 MMHG | DIASTOLIC BLOOD PRESSURE: 72 MMHG

## 2019-12-30 ENCOUNTER — HOSPITAL ENCOUNTER (INPATIENT)
Dept: HOSPITAL 11 - JP.ED | Age: 64
LOS: 6 days | Discharge: HOME | DRG: 190 | End: 2020-01-05
Attending: INTERNAL MEDICINE | Admitting: INTERNAL MEDICINE
Payer: MEDICARE

## 2019-12-30 DIAGNOSIS — F17.200: ICD-10-CM

## 2019-12-30 DIAGNOSIS — Z90.721: ICD-10-CM

## 2019-12-30 DIAGNOSIS — G89.29: ICD-10-CM

## 2019-12-30 DIAGNOSIS — J44.1: Primary | ICD-10-CM

## 2019-12-30 DIAGNOSIS — J30.9: ICD-10-CM

## 2019-12-30 DIAGNOSIS — K44.9: ICD-10-CM

## 2019-12-30 DIAGNOSIS — Z85.42: ICD-10-CM

## 2019-12-30 DIAGNOSIS — F17.210: ICD-10-CM

## 2019-12-30 DIAGNOSIS — Z79.2: ICD-10-CM

## 2019-12-30 DIAGNOSIS — Z79.51: ICD-10-CM

## 2019-12-30 DIAGNOSIS — Z98.890: ICD-10-CM

## 2019-12-30 DIAGNOSIS — Z90.710: ICD-10-CM

## 2019-12-30 DIAGNOSIS — J18.9: ICD-10-CM

## 2019-12-30 DIAGNOSIS — Z99.81: ICD-10-CM

## 2019-12-30 DIAGNOSIS — I42.9: ICD-10-CM

## 2019-12-30 DIAGNOSIS — G47.30: ICD-10-CM

## 2019-12-30 DIAGNOSIS — F41.9: ICD-10-CM

## 2019-12-30 DIAGNOSIS — M79.7: ICD-10-CM

## 2019-12-30 DIAGNOSIS — K21.9: ICD-10-CM

## 2019-12-30 DIAGNOSIS — L97.912: ICD-10-CM

## 2019-12-30 DIAGNOSIS — J96.91: ICD-10-CM

## 2019-12-30 DIAGNOSIS — Z85.41: ICD-10-CM

## 2019-12-30 DIAGNOSIS — J44.0: ICD-10-CM

## 2019-12-30 DIAGNOSIS — Z90.79: ICD-10-CM

## 2019-12-30 DIAGNOSIS — Z88.8: ICD-10-CM

## 2019-12-30 DIAGNOSIS — E66.9: ICD-10-CM

## 2019-12-30 DIAGNOSIS — E03.9: ICD-10-CM

## 2019-12-30 DIAGNOSIS — M54.9: ICD-10-CM

## 2019-12-30 DIAGNOSIS — Z79.899: ICD-10-CM

## 2019-12-30 DIAGNOSIS — Z88.1: ICD-10-CM

## 2019-12-30 DIAGNOSIS — M19.90: ICD-10-CM

## 2019-12-30 DIAGNOSIS — F32.9: ICD-10-CM

## 2019-12-30 PROCEDURE — 99285 EMERGENCY DEPT VISIT HI MDM: CPT

## 2019-12-30 PROCEDURE — 96374 THER/PROPH/DIAG INJ IV PUSH: CPT

## 2019-12-30 PROCEDURE — 87804 INFLUENZA ASSAY W/OPTIC: CPT

## 2019-12-30 PROCEDURE — 84484 ASSAY OF TROPONIN QUANT: CPT

## 2019-12-30 PROCEDURE — 85025 COMPLETE CBC W/AUTO DIFF WBC: CPT

## 2019-12-30 PROCEDURE — 83605 ASSAY OF LACTIC ACID: CPT

## 2019-12-30 PROCEDURE — 87040 BLOOD CULTURE FOR BACTERIA: CPT

## 2019-12-30 PROCEDURE — 80053 COMPREHEN METABOLIC PANEL: CPT

## 2019-12-30 PROCEDURE — 36415 COLL VENOUS BLD VENIPUNCTURE: CPT

## 2019-12-30 PROCEDURE — 94640 AIRWAY INHALATION TREATMENT: CPT

## 2019-12-30 PROCEDURE — 96361 HYDRATE IV INFUSION ADD-ON: CPT

## 2019-12-30 PROCEDURE — 71046 X-RAY EXAM CHEST 2 VIEWS: CPT

## 2019-12-30 RX ADMIN — TRIAMCINOLONE ACETONIDE SCH: 1 CREAM TOPICAL at 22:31

## 2019-12-30 NOTE — PCM.HP.2
H&P History of Present Illness





- General


Date of Service: 19


Admit Problem/Dx: 


 Admission Diagnosis/Problem





Admission Diagnosis/Problem      COPD with acute lower respiratory infection








Source of Information: Patient, Provider, RN


History Limitations: Reports: No Limitations





- History of Present Illness


Initial Comments - Free Text/Narative: 





chief complaint: shortness of breath with weakness.





64-year-old female presents emergency department with a complaint of shortness 

of breath cough and sputum production, she does have a known history of chronic 

obstructive pulmonary disease states she has been ill for about 3 days has 

progressively gotten worse.  She had fever this morning she did receive an 

influenza vaccine.  Denies any chest pain nausea vomiting difficulty with bowel 

movements, she normally wears oxygen at night was found to be hypoxic on 

initial evaluation by nurse


  





Onset of Symptoms: Reports: Gradual


Duration of Symptoms: Reports: Day(s):


Location: Reports: Generalized


Severity: Severe


Improves with: Reports: Rest


Worsens with: Reports: Movement


Associated Symptoms: Reports: Cough, Fever/Chills, Nausea/Vomiting, Shortness 

of Breath, Weakness


  ** Right Lower Leg


Pain Score (Numeric/FACES): 5





- Related Data


Allergies/Adverse Reactions: 


 Allergies











Allergy/AdvReac Type Severity Reaction Status Date / Time


 


ciprofloxacin [From Cipro] Allergy  Hives Verified 19 18:07


 


clonidine HCl [From Catapres] Allergy  Blisters Verified 19 18:07











Home Medications: 


 Home Meds





Levothyroxine Sodium [Synthroid] 25 mcg PO DAILY 01/29/15 [History]


Pantoprazole [ProTONIX Granules***] 40 mg PO BID 01/29/15 [History]


Albuterol Sulfate [Albuterol Sulfate HFA] 2 puff INH Q6H PRN 05/21/15 [History]


Ondansetron HCl [Zofran] 4 mg PO Q4H PRN 05/21/15 [History]


Loratadine 10 mg PO DAILY 16 [History]


buPROPion [Wellbutrin] 150 mg PO BID 09/10/16 [History]


Furosemide 40 mg PO DAILY PRN 17 [History]


Triamcinolone Acetonide [Kenalog 0.1% Crm] 1 applic TOP TID 17 [History]


Albuterol/Ipratropium [DuoNeb 3.0-0.5 MG/3 ML] 3 ml IH Q4H PRN 07/10/17 [History

]


Magnesium Oxide [Magnesium] 250 mg PO ASDIRECTED 17 [History]


Sertraline HCl [Zoloft] 50 mg PO DAILY 17 [History]


lamoTRIgine [Lamotrigine] 150 mg PO DAILY 17 [History]


traZODone 1 - 2 tab PO BEDTIME PRN 17 [History]


ClonazePAM [KlonoPIN] 0.5 mg PO ASDIRECTED PRN 17 [History]


Penciclovir [Denavir 1% Crm] 1.5 gm TP ASDIRECTED 17 [History]


Mometasone Furoate [Elocon 0.1% Crm] 1 applic TOP DAILY 18 [History]


tiZANidine [Zanaflex] 4 mg PO Q8H PRN 18 [History]


Hydrocodone/Acetaminophen [Norco 5-325 Tablet] 1 each PO Q6HR PRN #28 tablet  [Rx]


Amoxicillin/Potassium Clav [Amox-Clav 875-125 mg Tablet] 1 tab PO BID 19 [

History]











Past Medical History


HEENT History: Reports: Allergic Rhinitis, Impaired Vision


Cardiovascular History: Reports: Cardiomyopathy, SOB on Exertion


Respiratory History: Reports: Bronchitis, Recurrent, COPD, Sleep Apnea


Gastrointestinal History: Reports: Bowel Obstruction, Diverticulosis, GERD, 

Hiatal Hernia


Genitourinary History: Reports: UTI, Recurrent, Other (See Below)


Other Genitourinary History: bladder suspension


OB/GYN History: Reports: Pregnancy, Spontaneous 


Musculoskeletal History: Reports: Arthritis, Back Pain, Chronic, Fibromyalgia


Other Musculoskeletal History: right knee pain


Neurological History: Reports: Vertigo


Psychiatric History: Reports: Anxiety, Depression, Suicidal Ideation


Endocrine/Metabolic History: Reports: Hypothyroidism, Obesity/BMI 30+


Hematologic History: Reports: None


Immunologic History: Reports: None


Oncologic (Cancer) History: Reports: Cervix, Uterine


Other Oncologic History: complete hysterectomy 2015


Dermatologic History: Reports: Cellulitis


Other Dermatologic History: sore on right lower leg that won't go away





- Infectious Disease History


Infectious Disease History: Reports: Helicobacter Pylori, Measles, Mumps





- Past Surgical History


Head Surgeries/Procedures: Reports: None


HEENT Surgical History: Reports: Other (See Below)


Other HEENT Surgeries/Procedures: biopsy on tongue


Cardiovascular Surgical History: Reports: None


Respiratory Surgical History: Reports: None


GI Surgical History: Reports: Appendectomy, Colon, Colonoscopy, EGD, Hernia 

Repair/Other


Female  Surgical History: Reports: Hysterectomy, Salpingo-Oophorectomy


Endocrine Surgical History: Reports: None


Neurological Surgical History: Reports: None


Musculoskeletal Surgical History: Reports: None


Oncologic Surgical History: Reports: None


Dermatological Surgical History: Reports: None





Social & Family History





- Family History


Family Medical History: Noncontributory





- Tobacco Use


Smoking Status *Q: Current Every Day Smoker


Years of Tobacco use: 45


Packs/Tins Daily: 1


Used Tobacco, but Quit: No





- Caffeine Use


Caffeine Use: Reports: Coffee, Soda





- Recreational Drug Use


Recreational Drug Use: No





- Living Situation & Occupation


Living situation: Reports:  (Lives with her 80+ mother, and her sister 

is in the next house. Has 1 son and 2 grandchildren , who she is currently 

estranged from her child and grandchildren.)





H&P Review of Systems





- Review of Systems:


Review Of Systems: See Below


General: Reports: Fever, Chills, Malaise, Weakness, Decreased Appetite


HEENT: Reports: No Symptoms


Pulmonary: Reports: Shortness of Breath, Wheezing, Pleuritic Chest Pain, Cough, 

Sputum


Cardiovascular: Reports: Dyspnea on Exertion, Orthopnea


Gastrointestinal: Reports: No Symptoms


Genitourinary: Reports: No Symptoms


Musculoskeletal: Reports: Back Pain, Leg Pain, Muscle Pain


Skin: Reports: Wound (chronic right lower leg pain)





Exam





- Exam


Exam: See Below





- Vital Signs


Vital Signs: 


 Last Vital Signs











Temp  36.8 C   19 21:53


 


Pulse  83   19 21:53


 


Resp  16   19 21:53


 


BP  113/49 L  19 23:02


 


Pulse Ox  90 L  19 23:12











Weight: 85.23 kg





- Exam


Quality Assessment: Supplemental Oxygen (chronic home oxygen at 2l/ml), DVT 

Prophylaxis, Skin Breakdown


General: Alert, Oriented, Cooperative, Mild Distress


HEENT: PERRLA, Hearing Intact, Mucosa Moist & Pink, Nares Patent, Normal Nasal 

Septum, Posterior Pharynx Clear, Conjunctiva Clear, EOMI, EACs Clear, TMs Clear


Neck: Supple, Trachea Midline, 2


Lungs: Decreased Breath Sounds, Rales, Rhonchi, Wheezing


Cardiovascular: Regular Rhythm, Normal S1, Normal S2


GI/Abdominal Exam: Normal Bowel Sounds, Soft, Non-Tender, No Organomegaly, No 

Distention, No Abnormal Bruit, No Mass, Pelvis Stable


 (Female) Exam: Deferred


Rectal (Female) Exam: Deferred


Back Exam: Normal Inspection, Full Range of Motion, Other (upper back pain noted

)


Extremities: Leg Pain (right lower leg with 9x10 cm wound noted. scant 

yellowish discharge is noted.no odor), Redness


Skin: Wound (9x10 cm wound noted, scant discharge, raw - open to dermis.)


Neurological: Strength Equal Bilateral


Neuro Extensive - Mental Status: Alert, Oriented x3, Normal Mood/Affect, Normal 

Cognition


DTR: 2+: Patella (L), Patella (R)


Psychiatric: Alert, Normal Affect, Normal Mood





- Patient Data


Lab Results Last 24 hrs: 


 Laboratory Results - last 24 hr











  19 Range/Units





  18:35 18:35 18:35 


 


WBC  6.1    (4.5-11.0)  K/uL


 


RBC  3.66    (3.30-5.50)  M/uL


 


Hgb  10.9 L D    (12.0-15.0)  g/dL


 


Hct  34.4 L    (36.0-48.0)  %


 


MCV  94    (80-98)  fL


 


MCH  30    (27-31)  pg


 


MCHC  32    (32-36)  %


 


Plt Count  257    (150-400)  K/uL


 


Neut % (Auto)  64    (36-66)  %


 


Lymph % (Auto)  27    (24-44)  %


 


Mono % (Auto)  6    (2-6)  %


 


Eos % (Auto)  2    (2-4)  %


 


Baso % (Auto)  1    (0-1)  %


 


Sodium   137 L   (140-148)  mmol/L


 


Potassium   3.5 L   (3.6-5.2)  mmol/L


 


Chloride   101   (100-108)  mmol/L


 


Carbon Dioxide   28   (21-32)  mmol/L


 


Anion Gap   11.5   (5.0-14.0)  mmol/L


 


BUN   13   (7-18)  mg/dL


 


Creatinine   0.9   (0.6-1.0)  mg/dL


 


Est Cr Clr Drug Dosing   50.60   mL/min


 


Estimated GFR (MDRD)   > 60   (>60)  


 


Glucose   68 L   ()  mg/dL


 


Lactic Acid    1.3  (0.4-2.0)  mmol/L


 


Calcium   8.1 L D   (8.5-10.1)  mg/dL


 


Total Bilirubin   0.3   (0.2-1.0)  mg/dL


 


AST   25   (15-37)  U/L


 


ALT   23  D   (12-78)  U/L


 


Alkaline Phosphatase   72   ()  U/L


 


Troponin I   < 0.017   (0.000-0.056)  ng/mL


 


Total Protein   7.0   (6.4-8.2)  g/dL


 


Albumin   2.8 L   (3.4-5.0)  g/dL


 


Globulin   4.2 H   (2.3-3.5)  g/dL


 


Albumin/Globulin Ratio   0.7 L   (1.2-2.2)  











Result Diagrams: 


 19 18:35





 19 18:35


Jg Results Last 24 hrs: 


 Microbiology











 19 19:19 Influenza Type A Antigen Screen - Final





 Nasal Aspirate, Unspecified    NEGATIVE INFLUENZA A VIRUS AG





    REFERENCE RANGE: NEGATIVE





 Influenza Type B Antigen Screen - Final





    NEGATIVE INFLUENZA B VIRUS AG





    REFERENCE RANGE: NEGATIVE














Sepsis Event Note





- Evaluation


Sepsis Screening Result: No Definite Risk





- Focused Exam


Vital Signs: 


 Vital Signs











  Temp Pulse Resp BP Pulse Ox


 


 19 23:12      90 L


 


 19 23:03      92 L


 


 19 23:02     113/49 L 


 


 19 21:53  36.8 C  83  16  85/42 L  92 L


 


 19 20:18  36.2 C  88  14  110/71  91 L


 


 19 17:59  36.6 C  81  20  104/35 L 











Date Exam was Performed: 19


Time Exam was Performed: 23:38





- Problem List


(1) COPD exacerbation


SNOMED Code(s): 658009947


   ICD Code: J44.1 - CHRONIC OBSTRUCTIVE PULMONARY DISEASE W (ACUTE) 

EXACERBATION   Status: Acute   Current Visit: Yes   





(2) Pneumonia


SNOMED Code(s): 323763514


   ICD Code: J18.9 - PNEUMONIA, UNSPECIFIED ORGANISM   Status: Acute   Priority

: High   Current Visit: Yes   


Qualifiers: 


   Laterality: right 





(3) Chronic ulcer of leg


SNOMED Code(s): 18882325


   ICD Code: L97.909 - NON-PRS CHRONIC ULC UNSP PRT OF UNSP LOW LEG W UNSP 

SEVERITY   Status: Acute   Priority: Low   Current Visit: Yes   


Qualifiers: 


   Laterality: right   Non-pressure ulcer stage: with fat layer exposed   

Qualified Code(s): L97.912 - Non-pressure chronic ulcer of unspecified part of 

right lower leg with fat layer exposed   


Problem List Initiated/Reviewed/Updated: Yes


Orders Last 24hrs: 


 Active Orders 24 hr











 Category Date Time Status


 


 Intake and Output [RC] QSHIFT Care  19 21:53 Active


 


 Notify Provider Vital Signs [RC] ASDIRECTED Care  19 21:53 Active


 


 Oxygen Therapy [RC] PRN Care  19 21:53 Active


 


 Pulse Oximetry [RC] CONTINUOUS Care  19 21:53 Active


 


 RT Aerosol Therapy [RC] ASDIRECTED Care  19 18:35 Active


 


 RT Aerosol Therapy [RC] ASDIRECTED Care  19 21:53 Active


 


 Up ad Karrie [RC] ASDIRECTED Care  19 21:53 Active


 


 VTE/DVT Education [RC] Per Unit Routine Care  19 21:53 Active


 


 Vital Signs [RC] Q4H Care  19 21:53 Active


 


 Wound Care Clinician Consult [Consult to Wound Care Cons  19 21:53 Active





 Services] [CONS] Routine   


 


 Regular Diet [DIET] Diet  19 Breakfast Active


 


 BASIC METABOLIC PANEL,BMP [CHEM] AM Lab  19 05:11 Ordered


 


 CBC WITH AUTO DIFF [HEME] AM Lab  19 05:11 Ordered


 


 CULTURE BLOOD [BC] Urgent Lab  19 20:22 Ordered


 


 CULTURE BLOOD [BC] Urgent Lab  19 20:22 Ordered


 


 LACTIC ACID [CHEM] Timed Lab  19 02:10 Ordered


 


 Acetaminophen [Tylenol] Med  19 21:53 Active





 650 mg PO Q4H PRN   


 


 Acetaminophen/HYDROcodone [Norco 325-5 MG] Med  19 21:53 Active





 1 tab PO Q6H PRN   


 


 Albuterol/Ipratropium [DuoNeb 3.0-0.5 MG/3 ML] Med  19 22:00 Active





 3 ml NEB QIDRT   


 


 Azithromycin [Zithromax] 500 mg Med  19 20:30 Active





 Sodium Chloride 0.9% [Normal Saline] 250 ml   





 IV Q24H   


 


 ClonazePAM [KlonoPIN] Med  19 21:53 Pending





 0.5 mg PO ASDIRECTED PRN   


 


 Docusate Sodium [Colace] Med  19 21:53 Active





 100 mg PO BID PRN   


 


 Enoxaparin [Lovenox] Med  19 09:00 Active





 40 mg SUBCUT DAILY   


 


 Furosemide [Lasix] Med  19 21:53 Active





 40 mg PO DAILY PRN   


 


 LORazepam [Ativan] Med  19 21:53 Active





 1 mg IV Q6H PRN   


 


 Levothyroxine Med  19 07:00 Active





 25 mcg PO ACBREAKFAST@0700   


 


 Magnesium Oxide Med  19 09:00 Active





 200 mg PO DAILY   


 


 Morphine Med  19 21:48 Active





 2 mg IVPUSH Q4H PRN   


 


 Non-Formulary Medication [NF Drug] Med  19 09:00 Pending





 1 each TOP DAILY   


 


 Ondansetron [Zofran ODT] Med  19 21:53 Active





 4 mg PO Q6H PRN   


 


 Pantoprazole [ProTONIX Granules***] Med  19 21:53 Active





 40 mg PO BIDAC   


 


 Penciclovir [Denavir 1% Crm] Med  19 21:53 Pending





 1.5 gm TP ASDIRECTED   


 


 Sertraline [Zoloft] Med  19 09:00 Active





 50 mg PO DAILY   


 


 Sodium Chloride 0.9% [Normal Saline] 1,000 ml Med  19 21:53 Active





 IV ASDIRECTED   


 


 Sodium Chloride 0.9% [Saline Flush] Med  19 18:32 Active





 10 ml FLUSH ASDIRECTED PRN   


 


 Triamcinolone Acetonide [Triamcinolone Acetonide 0.1% Med  19 21:53 

Active





 Crm]   





 0 gm TOP TID   


 


 Zolpidem [Ambien] Med  19 21:53 Active





 5 mg PO BEDTIME PRN   


 


 bisacodyL [Dulcolax] Med  19 21:53 Active





 5 mg PO DAILY PRN   


 


 buPROPion [Wellbutrin] Med  19 21:53 Active





 150 mg PO BID   


 


 cefTRIAXone [Rocephin] 1 gm Med  19 20:30 Active





 Sodium Chloride 0.9% [Normal Saline] 50 ml   





 IV Q24H   


 


 lamoTRIgine Med  19 09:00 Active





 150 mg PO DAILY   


 


 methylPREDNISolone Sod Succ [Solu-MEDROL] Med  19 04:00 Active





 40 mg IVPUSH Q6H   


 


 tiZANidine [Zanaflex] Med  19 21:53 Active





 4 mg PO Q8H PRN   


 


 traZODone Med  19 21:53 Active





 50 - 100 mg PO BEDTIME PRN   


 


 Blood Culture x2 Reflex Set [OM.PC] Urgent Oth  19 20:22 Ordered


 


 Peripheral IV Insertion Adult [OM.PC] Urgent Oth  19 18:32 Ordered


 


 Resuscitation Status Routine Resus Stat  19 20:29 Ordered








 Medication Orders





Acetaminophen (Tylenol)  650 mg PO Q4H PRN


   PRN Reason: Pain (Mild 1-3)/fever


Hydrocodone Bitart/Acetaminophen (Norco 325-5 Mg)  1 tab PO Q6H PRN


   PRN Reason: Pain


Albuterol/Ipratropium (Duoneb 3.0-0.5 Mg/3 Ml)  3 ml NEB QIDRT [facility]


   Last Admin: 19 22:26  Dose: 3 ml


Bisacodyl (Dulcolax)  5 mg PO DAILY PRN


   PRN Reason: Constipation


Bupropion HCl (Wellbutrin)  150 mg PO BID [facility]


   Last Admin: 19 22:44  Dose: 150 mg


Clonazepam (Klonopin)  0.5 mg PO ASDIRECTED PRN


   PRN Reason: Anxiety


Docusate Sodium (Colace)  100 mg PO BID PRN


   PRN Reason: Constipation


Enoxaparin Sodium (Lovenox)  40 mg SUBCUT DAILY [facility]


Furosemide (Lasix)  40 mg PO DAILY PRN


   PRN Reason: Other


Azithromycin 500 mg/ Sodium (Chloride)  250 mls @ 250 mls/hr IV Q24H [facility]


   Last Admin: 19 21:12  Dose: 250 mls/hr


Ceftriaxone Sodium 1 gm/ (Sodium Chloride)  50 mls @ 100 mls/hr IV Q24H [facility]


   Last Admin: 19 20:45  Dose: 100 mls/hr


Sodium Chloride (Normal Saline)  1,000 mls @ 125 mls/hr IV ASDIRECTED [facility]


Lamotrigine (Lamotrigine)  150 mg PO DAILY [facility]


Levothyroxine Sodium (Levothyroxine)  25 mcg PO ACBREAKFAST@0700 [facility]


Lorazepam (Ativan)  1 mg IV Q6H PRN


   PRN Reason: Nausea/Vomiting


Magnesium Oxide (Magnesium Oxide)  200 mg PO DAILY [facility]


Methylprednisolone Sodium Succinate (Solu-Medrol)  40 mg IVPUSH Q6H ABEL


Morphine Sulfate (Morphine)  2 mg IVPUSH Q4H PRN


   PRN Reason: Pain (severe 7-10)


Non-Formulary Medication (Penciclovir [Denavir 1% Crm])  1.5 gm TP ASDIRECTED 

[facility]


Non-Formulary Medication (Nf Drug)  1 each TOP DAILY [facility]


Ondansetron HCl (Zofran Odt)  4 mg PO Q6H PRN


   PRN Reason: Nausea able to take PO


Pantoprazole Sodium (Protonix Granules***)  40 mg PO BIDAC [facility]


   Last Admin: 19 22:44  Dose: 40 mg


Sertraline HCl (Zoloft)  50 mg PO DAILY [facility]


Sodium Chloride (Saline Flush)  10 ml FLUSH ASDIRECTED PRN


   PRN Reason: Keep Vein Open


   Last Admin: 19 18:51  Dose: 10 ml


Tizanidine HCl (Zanaflex)  4 mg PO Q8H PRN


   PRN Reason: Muscle Spasm


Trazodone HCl (Trazodone)  50 - 100 mg PO BEDTIME PRN


   PRN Reason: Sleep


   Last Admin: 19 22:44  Dose: 50 mg


Triamcinolone Acetonide (Triamcinolone Acetonide 0.1% Crm)  0 gm TOP TID [facility]


   Last Admin: 19 22:31 Dose:  Not Given


Zolpidem Tartrate (Ambien)  5 mg PO BEDTIME PRN


   PRN Reason: Sleep








Assessment/Plan Comment:: 


Assessment/Plan Comment:: 


ASSESSMENT AND PLAN OF CARE: 


chief complaint: shortness of breath with weakness.





64-year-old female presents emergency department with a complaint of shortness 

of breath cough and sputum production, she does have a known history of chronic 

obstructive pulmonary disease states she has been ill for about 3 days has 

progressively gotten worse.  She had fever this morning she did receive an 

influenza vaccine.  Denies any chest pain nausea vomiting difficulty with bowel 

movements, she normally wears oxygen at night was found to be hypoxic on 

initial evaluation by nurse.  





Pneumonia, with COPD


-Admit to [address] [place] [address] for further monitoring


-IV Fluids for rehydration NS at 125 mL per hour


-IV Antibiotic; Rocephin 1 gram IV every 24 hours


-IV Zithromax 500mg every 24 hours     


-oxygen to keep sats greater than 95%


-IV Solumedrol 125mg now, then 62.5mg every 8 hours  


-schedule Duo nebs every 6 hours, Albuterol nebs every 4 hours prn             

       


-Advise to notify nurses of any chest pain or other symptoms


-blood cultures x2 pending


-And a.m. labs: CBC, BMP 





Right chronic leg ulcer


-daily dressing changes with non-stick dressing, 4x4 and coban. no tape


-consult to Wound Care





Maintenance issues


-Orders home meds: chronic medication


-Nutrition: regular diet


-Pablo catheter -not indicated at this time


-DVT: Lovenox 30 units subcut.


-PPI; PO Protonix 40mg daily





CODE STATUS: FULL





Admission status: Admit to [address] [place] [address]


Admission justification.  This patient will be admitted for inpatient services 

and is medically appropriate meeting medical necessity for inpatient admission 

as outlined in my documentation.


I reasonably expect the patient will require inpatient services that span.  

Time over 2 midnights.  I reasonably expect this patient to be discharged or 

transferred within 96 hours after admission to the critical access hospital.





Disposition: home with Family





Primary care provider: Dr. Schmidt, [facility]





Hospitalist: Dr. Canas

















- Mortality Measure


Prognosis:: Good

## 2019-12-30 NOTE — EDM.PDOC
ED HPI GENERAL MEDICAL PROBLEM





- General


Chief Complaint: Respiratory Problem


Stated Complaint: FEVER,DIFFICULTY BREATHING


Time Seen by Provider: 19 18:27


Source of Information: Reports: Patient, Family, RN Notes Reviewed


History Limitations: Reports: No Limitations





- History of Present Illness


INITIAL COMMENTS - FREE TEXT/NARRATIVE: 





64-year-old female presents emergency department with a complaint of shortness 

of breath cough and sputum production, she does have a known history of chronic 

obstructive pulmonary disease states she has been ill for about 3 days has 

progressively gotten worse.  She had fever this morning she did receive an 

influenza vaccine.  Denies any chest pain nausea vomiting difficulty with bowel 

movements, she normally wears oxygen at night was found to be hypoxic on 

initial evaluation by nurse


  ** Right Lower Leg


Pain Score (Numeric/FACES): 5





- Related Data


 Allergies











Allergy/AdvReac Type Severity Reaction Status Date / Time


 


ciprofloxacin [From Cipro] Allergy  Hives Verified 19 18:07


 


clonidine HCl [From Catapres] Allergy  Blisters Verified 19 18:07











Home Meds: 


 Home Meds





Levothyroxine Sodium [Synthroid] 25 mcg PO DAILY 01/29/15 [History]


Pantoprazole [ProTONIX Granules***] 40 mg PO BID 01/29/15 [History]


Albuterol Sulfate [Albuterol Sulfate HFA] 2 puff INH Q6H PRN 05/21/15 [History]


Ondansetron HCl [Zofran] 4 mg PO Q4H PRN 05/21/15 [History]


Loratadine 10 mg PO DAILY 16 [History]


buPROPion [Wellbutrin] 150 mg PO BID 09/10/16 [History]


Furosemide 40 mg PO DAILY PRN 17 [History]


Triamcinolone Acetonide [Kenalog 0.1% Crm] 1 applic TOP TID 17 [History]


Albuterol/Ipratropium [DuoNeb 3.0-0.5 MG/3 ML] 3 ml IH Q4H PRN 07/10/17 [History

]


Magnesium Oxide [Magnesium] 250 mg PO ASDIRECTED 17 [History]


Sertraline HCl [Zoloft] 50 mg PO DAILY 17 [History]


lamoTRIgine [Lamotrigine] 150 mg PO DAILY 17 [History]


traZODone 1 - 2 tab PO BEDTIME PRN 17 [History]


ClonazePAM [KlonoPIN] 0.5 mg PO ASDIRECTED PRN 17 [History]


Penciclovir [Denavir 1% Crm] 1.5 gm TP ASDIRECTED 17 [History]


Mometasone Furoate [Elocon 0.1% Crm] 1 applic TOP DAILY 18 [History]


tiZANidine [Zanaflex] 4 mg PO Q8H PRN 18 [History]


Hydrocodone/Acetaminophen [Norco 5-325 Tablet] 1 each PO Q6HR PRN #28 tablet  [Rx]


Amoxicillin/Potassium Clav [Amox-Clav 875-125 mg Tablet] 1 tab PO BID 19 [

History]











Past Medical History


HEENT History: Reports: Allergic Rhinitis, Impaired Vision


Cardiovascular History: Reports: Cardiomyopathy, SOB on Exertion


Respiratory History: Reports: Bronchitis, Recurrent, COPD, Sleep Apnea


Gastrointestinal History: Reports: Bowel Obstruction, Diverticulosis, GERD, 

Hiatal Hernia


Genitourinary History: Reports: UTI, Recurrent, Other (See Below)


Other Genitourinary History: bladder suspension


OB/GYN History: Reports: Pregnancy, Spontaneous 


Musculoskeletal History: Reports: Arthritis, Back Pain, Chronic, Fibromyalgia


Other Musculoskeletal History: right knee pain


Neurological History: Reports: Vertigo


Psychiatric History: Reports: Anxiety, Depression, Suicidal Ideation


Endocrine/Metabolic History: Reports: Hypothyroidism, Obesity/BMI 30+


Hematologic History: Reports: None


Immunologic History: Reports: None


Oncologic (Cancer) History: Reports: Cervix, Uterine


Other Oncologic History: complete hysterectomy 2015


Dermatologic History: Reports: Cellulitis


Other Dermatologic History: sore on right lower leg that won't go away





- Infectious Disease History


Infectious Disease History: Reports: Helicobacter Pylori, Measles, Mumps





- Past Surgical History


Head Surgeries/Procedures: Reports: None


HEENT Surgical History: Reports: Other (See Below)


Other HEENT Surgeries/Procedures: biopsy on tongue


Cardiovascular Surgical History: Reports: None


Respiratory Surgical History: Reports: None


GI Surgical History: Reports: Appendectomy, Colon, Colonoscopy, EGD, Hernia 

Repair/Other


Female  Surgical History: Reports: Hysterectomy, Salpingo-Oophorectomy


Endocrine Surgical History: Reports: None


Neurological Surgical History: Reports: None


Musculoskeletal Surgical History: Reports: None


Oncologic Surgical History: Reports: None


Dermatological Surgical History: Reports: None





Social & Family History





- Family History


Family Medical History: Noncontributory





- Tobacco Use


Smoking Status *Q: Current Every Day Smoker


Years of Tobacco use: 45


Packs/Tins Daily: 1





- Caffeine Use


Caffeine Use: Reports: Coffee, Soda





- Recreational Drug Use


Recreational Drug Use: No





- Living Situation & Occupation


Living situation: Reports:  (Lives with her 80+ mother, and her sister 

is in the next house. Has 1 son and 2 grandchildren , who she is currently 

estranged from her child and grandchildren.)





ED ROS GENERAL





- Review of Systems


Review Of Systems: See Below


Constitutional: Reports: Fever, Chills, Weakness


HEENT: Reports: No Symptoms


Respiratory: Reports: Shortness of Breath, Wheezing, Cough, Sputum


Cardiovascular: Reports: Dyspnea on Exertion


GI/Abdominal: Reports: No Symptoms


: Reports: No Symptoms


Musculoskeletal: Reports: No Symptoms


Skin: Reports: No Symptoms





ED EXAM, GENERAL





- Physical Exam


Exam: See Below


Free Text/Narrative:: 





Examination of the right lower extremity I do appreciate a chronic wound there 

is some sanguinous material there is small amount of eschar formed there is 

odor coming from the wound mild amount of erythema


Exam Limited By: No Limitations


General Appearance: Alert, WD/WN, No Apparent Distress


Neck: Normal Inspection, Supple, Non-Tender, Full Range of Motion


Respiratory/Chest: No Accessory Muscle Use, Rhonchi, Wheezing


Cardiovascular: Regular Rate, Rhythm, No Murmur


GI/Abdominal: Soft, Non-Tender





Course





- Vital Signs


Last Recorded V/S: 


 Last Vital Signs











Temp  97.8 F   19 17:59


 


Pulse  81   19 17:59


 


Resp  20   19 17:59


 


BP  104/35 L  19 17:59


 


Pulse Ox      














- Orders/Labs/Meds


Orders: 


 Active Orders 24 hr











 Category Date Time Status


 


 Peripheral IV Care [RC] .AS DIRECTED Care  19 18:33 Active


 


 RT Aerosol Therapy [RC] ASDIRECTED Care  19 18:35 Active


 


 LACTIC ACID [CHEM] Stat Lab  19 20:18 Ordered


 


 Lactated Ringers [Ringers, Lactated] 1,000 ml Med  19 18:32 Active





 IV ASDIRECTED   


 


 Sodium Chloride 0.9% [Saline Flush] Med  19 18:32 Active





 10 ml FLUSH ASDIRECTED PRN   


 


 Peripheral IV Insertion Adult [OM.PC] Urgent Oth  19 18:32 Ordered








 Medication Orders





Lactated Ringer's (Ringers, Lactated)  1,000 mls @ 500 mls/hr IV ASDIRECTED ONE


   Stop: 19 20:31


   Last Admin: 19 18:49  Dose: 500 mls/hr


Sodium Chloride (Saline Flush)  10 ml FLUSH ASDIRECTED PRN


   PRN Reason: Keep Vein Open


   Last Admin: 19 18:51  Dose: 10 ml








Labs: 


 Laboratory Tests











  19 Range/Units





  18:35 18:35 


 


WBC  6.1   (4.5-11.0)  K/uL


 


RBC  3.66   (3.30-5.50)  M/uL


 


Hgb  10.9 L D   (12.0-15.0)  g/dL


 


Hct  34.4 L   (36.0-48.0)  %


 


MCV  94   (80-98)  fL


 


MCH  30   (27-31)  pg


 


MCHC  32   (32-36)  %


 


Plt Count  257   (150-400)  K/uL


 


Neut % (Auto)  64   (36-66)  %


 


Lymph % (Auto)  27   (24-44)  %


 


Mono % (Auto)  6   (2-6)  %


 


Eos % (Auto)  2   (2-4)  %


 


Baso % (Auto)  1   (0-1)  %


 


Sodium   137 L  (140-148)  mmol/L


 


Potassium   3.5 L  (3.6-5.2)  mmol/L


 


Chloride   101  (100-108)  mmol/L


 


Carbon Dioxide   28  (21-32)  mmol/L


 


Anion Gap   11.5  (5.0-14.0)  mmol/L


 


BUN   13  (7-18)  mg/dL


 


Creatinine   0.9  (0.6-1.0)  mg/dL


 


Est Cr Clr Drug Dosing   50.60  mL/min


 


Estimated GFR (MDRD)   > 60  (>60)  


 


Glucose   68 L  ()  mg/dL


 


Calcium   8.1 L D  (8.5-10.1)  mg/dL


 


Total Bilirubin   0.3  (0.2-1.0)  mg/dL


 


AST   25  (15-37)  U/L


 


ALT   23  D  (12-78)  U/L


 


Alkaline Phosphatase   72  ()  U/L


 


Troponin I   < 0.017  (0.000-0.056)  ng/mL


 


Total Protein   7.0  (6.4-8.2)  g/dL


 


Albumin   2.8 L  (3.4-5.0)  g/dL


 


Globulin   4.2 H  (2.3-3.5)  g/dL


 


Albumin/Globulin Ratio   0.7 L  (1.2-2.2)  











Meds: 


Medications











Generic Name Dose Route Start Last Admin





  Trade Name Freq  PRN Reason Stop Dose Admin


 


Lactated Ringer's  1,000 mls @ 500 mls/hr  19 18:32  19 18:49





  Ringers, Lactated  IV  19 20:31  500 mls/hr





  ASDIRECTED ONE   Administration





     





     





     





     


 


Sodium Chloride  10 ml  19 18:32  19 18:51





  Saline Flush  FLUSH   10 ml





  ASDIRECTED PRN   Administration





  Keep Vein Open   





     





     





     














Discontinued Medications














Generic Name Dose Route Start Last Admin





  Trade Name Freq  PRN Reason Stop Dose Admin


 


Albuterol/Ipratropium  3 ml  19 18:34  19 18:48





  Duoneb 3.0-0.5 Mg/3 Ml  NEB  19 18:35  3 ml





  ONETIME ONE   Administration





     





     





     





     


 


Fentanyl  50 mcg  19 18:57  19 19:03





  Sublimaze  IVPUSH  19 18:58  50 mcg





  ONETIME ONE   Administration





     





     





     





     














Departure





- Departure


Time of Disposition: 20:23


Disposition: Admitted As Inpatient 67


Condition: Fair


Clinical Impression: 


 COPD exacerbation








- Discharge Information


Referrals: 


Gabriel Schmidt MD [Primary Care Provider] - 


Forms:  ED Department Discharge





Sepsis Event Note





- Evaluation


Sepsis Screening Result: No Definite Risk





- Focused Exam


Vital Signs: 


 Vital Signs











  Temp Pulse Resp BP


 


 19 17:59  97.8 F  81  20  104/35 L











Date Exam was Performed: 19


Time Exam was Performed: 20:22





- My Orders


Last 24 Hours: 


My Active Orders





19 18:32


Lactated Ringers [Ringers, Lactated] 1,000 ml IV ASDIRECTED 


Sodium Chloride 0.9% [Saline Flush]   10 ml FLUSH ASDIRECTED PRN 


Peripheral IV Insertion Adult [OM.PC] Urgent 





19 18:33


Peripheral IV Care [RC] .AS DIRECTED 





19 18:35


RT Aerosol Therapy [RC] ASDIRECTED 





19 20:18


LACTIC ACID [CHEM] Stat 














- Assessment/Plan


Last 24 Hours: 


My Active Orders





19 18:32


Lactated Ringers [Ringers, Lactated] 1,000 ml IV ASDIRECTED 


Sodium Chloride 0.9% [Saline Flush]   10 ml FLUSH ASDIRECTED PRN 


Peripheral IV Insertion Adult [OM.PC] Urgent 





19 18:33


Peripheral IV Care [RC] .AS DIRECTED 





19 18:35


RT Aerosol Therapy [RC] ASDIRECTED 





19 20:18


LACTIC ACID [CHEM] Stat 











Plan: 





Assessment





Acuity = acute





Site and laterality = COPD exacerbation with bronchitis





Etiology  = unknown





Manifestations = fever, dyspnea





Location of injury =  Home





Lab values = CBC, CMP unremarkable troponin was negative, chest x-ray shows 

increased opacities in the lower lobes bilaterally





Plan


Discussed the case with hospitalist on-call at  can agreed to come evaluate 

patient emergency department for admission

















 This note was dictated using dragon voice recognition software please call 

with any questions on syntax or grammar.

## 2019-12-30 NOTE — CRLCR
Indication:



Shortness of breath.



Technique:



PA and lateral views the chest.



Comparison:



July 13, 2015.



Findings:



The heart is normal in size. Diffusely increased interstitial opacities are 

identified bilaterally, increased since the previous exam. No pleural 

effusion or pneumothorax is identified.



Impression:



Diffusely increased interstitial opacities bilaterally, particularly in the 

right lower lobe.



Dictated by Brunilda Chung MD @ Dec 30 2019  7:32PM



Signed by Dr. Brunilda Chung @ Dec 30 2019  7:33PM

## 2019-12-31 RX ADMIN — TRIAMCINOLONE ACETONIDE SCH: 1 CREAM TOPICAL at 21:06

## 2019-12-31 RX ADMIN — ALBUTEROL SULFATE PRN MG: 2.5 SOLUTION RESPIRATORY (INHALATION) at 01:14

## 2019-12-31 RX ADMIN — METHYLPREDNISOLONE SODIUM SUCCINATE SCH MG: 40 INJECTION, POWDER, FOR SOLUTION INTRAMUSCULAR; INTRAVENOUS at 09:01

## 2019-12-31 RX ADMIN — TRIAMCINOLONE ACETONIDE SCH: 1 CREAM TOPICAL at 08:46

## 2019-12-31 RX ADMIN — HYDROCODONE BITARTRATE AND ACETAMINOPHEN PRN TAB: 5; 325 TABLET ORAL at 11:57

## 2019-12-31 RX ADMIN — METHYLPREDNISOLONE SODIUM SUCCINATE SCH MG: 40 INJECTION, POWDER, FOR SOLUTION INTRAMUSCULAR; INTRAVENOUS at 16:03

## 2019-12-31 RX ADMIN — BUPROPION HYDROCHLORIDE SCH MG: 150 TABLET, EXTENDED RELEASE ORAL at 08:47

## 2019-12-31 RX ADMIN — TRIAMCINOLONE ACETONIDE SCH: 1 CREAM TOPICAL at 14:25

## 2019-12-31 RX ADMIN — ALBUTEROL SULFATE PRN MG: 2.5 SOLUTION RESPIRATORY (INHALATION) at 04:42

## 2019-12-31 RX ADMIN — ACETYLCYSTEINE SCH MG: 200 INHALANT RESPIRATORY (INHALATION) at 21:17

## 2019-12-31 RX ADMIN — METHYLPREDNISOLONE SODIUM SUCCINATE SCH MG: 40 INJECTION, POWDER, FOR SOLUTION INTRAMUSCULAR; INTRAVENOUS at 21:27

## 2019-12-31 RX ADMIN — BUPROPION HYDROCHLORIDE SCH MG: 150 TABLET, EXTENDED RELEASE ORAL at 21:06

## 2019-12-31 RX ADMIN — METHYLPREDNISOLONE SODIUM SUCCINATE SCH MG: 40 INJECTION, POWDER, FOR SOLUTION INTRAMUSCULAR; INTRAVENOUS at 04:33

## 2019-12-31 RX ADMIN — ONDANSETRON PRN MG: 4 TABLET, ORALLY DISINTEGRATING ORAL at 22:38

## 2019-12-31 NOTE — PCM.PN
- General Info


Date of Service: 12/31/19


Subjective Update: 





Ms. Grey is a 64-year-old woman who was admitted through the emergency 

department last night with weakness, shortness of breath, and hypoxia, 

secondary to COPD exacerbation and underlying pneumonia.  She feels moderately 

improved from admission and has been able to ambulate with assistance.  

Continued cough which for the most part has been nonproductive.  Vital signs 

have been stable and she has remained afebrile.





- Review of Systems


General: Reports: Weakness.  Denies: Fever, Chills


Pulmonary: Reports: Shortness of Breath, Cough, Wheezing.  Denies: Pleuritic 

Chest Pain, Sputum, Hemoptysis


Cardiovascular: Reports: Dyspnea on Exertion.  Denies: Chest Pain, Palpitations

, Orthopnea, PND, Edema, Lightheadedness


Gastrointestinal: Reports: No Symptoms





- Patient Data


Vitals - Most Recent: 


 Last Vital Signs











Temp  96.6 F   12/31/19 15:00


 


Pulse  74   12/31/19 15:00


 


Resp  18   12/31/19 15:00


 


BP  119/63   12/31/19 15:00


 


Pulse Ox  88 L  12/31/19 15:00











Weight - Most Recent: 187 lb 14.4 oz


I&O - Last 24 Hours: 


 Intake & Output











 12/31/19 12/31/19 12/31/19





 06:59 14:59 22:59


 


Intake Total 500 1480 


 


Output Total 200 600 


 


Balance 300 880 











Lab Results Last 24 Hours: 


 Laboratory Results - last 24 hr











  12/30/19 12/30/19 12/30/19 Range/Units





  18:35 18:35 18:35 


 


WBC  6.1    (4.5-11.0)  K/uL


 


RBC  3.66    (3.30-5.50)  M/uL


 


Hgb  10.9 L D    (12.0-15.0)  g/dL


 


Hct  34.4 L    (36.0-48.0)  %


 


MCV  94    (80-98)  fL


 


MCH  30    (27-31)  pg


 


MCHC  32    (32-36)  %


 


Plt Count  257    (150-400)  K/uL


 


Neut % (Auto)  64    (36-66)  %


 


Lymph % (Auto)  27    (24-44)  %


 


Mono % (Auto)  6    (2-6)  %


 


Eos % (Auto)  2    (2-4)  %


 


Baso % (Auto)  1    (0-1)  %


 


Sodium   137 L   (140-148)  mmol/L


 


Potassium   3.5 L   (3.6-5.2)  mmol/L


 


Chloride   101   (100-108)  mmol/L


 


Carbon Dioxide   28   (21-32)  mmol/L


 


Anion Gap   11.5   (5.0-14.0)  mmol/L


 


BUN   13   (7-18)  mg/dL


 


Creatinine   0.9   (0.6-1.0)  mg/dL


 


Est Cr Clr Drug Dosing   50.60   mL/min


 


Estimated GFR (MDRD)   > 60   (>60)  


 


Glucose   68 L   ()  mg/dL


 


Lactic Acid    1.3  (0.4-2.0)  mmol/L


 


Calcium   8.1 L D   (8.5-10.1)  mg/dL


 


Total Bilirubin   0.3   (0.2-1.0)  mg/dL


 


AST   25   (15-37)  U/L


 


ALT   23  D   (12-78)  U/L


 


Alkaline Phosphatase   72   ()  U/L


 


Troponin I   < 0.017   (0.000-0.056)  ng/mL


 


Total Protein   7.0   (6.4-8.2)  g/dL


 


Albumin   2.8 L   (3.4-5.0)  g/dL


 


Globulin   4.2 H   (2.3-3.5)  g/dL


 


Albumin/Globulin Ratio   0.7 L   (1.2-2.2)  














  12/31/19 12/31/19 12/31/19 Range/Units





  02:30 02:30 02:30 


 


WBC   6.1   (4.5-11.0)  K/uL


 


RBC   3.47   (3.30-5.50)  M/uL


 


Hgb   10.3 L   (12.0-15.0)  g/dL


 


Hct   32.6 L   (36.0-48.0)  %


 


MCV   94   (80-98)  fL


 


MCH   30   (27-31)  pg


 


MCHC   32   (32-36)  %


 


Plt Count   229   (150-400)  K/uL


 


Neut % (Auto)   86 H   (36-66)  %


 


Lymph % (Auto)   12 L   (24-44)  %


 


Mono % (Auto)   1 L   (2-6)  %


 


Eos % (Auto)   0 L   (2-4)  %


 


Baso % (Auto)   1   (0-1)  %


 


Sodium    136 L  (140-148)  mmol/L


 


Potassium    3.4 L  (3.6-5.2)  mmol/L


 


Chloride    101  (100-108)  mmol/L


 


Carbon Dioxide    25  (21-32)  mmol/L


 


Anion Gap    13.4  (5.0-14.0)  mmol/L


 


BUN    13  (7-18)  mg/dL


 


Creatinine    0.8  (0.6-1.0)  mg/dL


 


Est Cr Clr Drug Dosing    56.93  mL/min


 


Estimated GFR (MDRD)    > 60  (>60)  


 


Glucose    151 H  ()  mg/dL


 


Lactic Acid  1.1    (0.4-2.0)  mmol/L


 


Calcium    7.9 L  (8.5-10.1)  mg/dL


 


Total Bilirubin     (0.2-1.0)  mg/dL


 


AST     (15-37)  U/L


 


ALT     (12-78)  U/L


 


Alkaline Phosphatase     ()  U/L


 


Troponin I     (0.000-0.056)  ng/mL


 


Total Protein     (6.4-8.2)  g/dL


 


Albumin     (3.4-5.0)  g/dL


 


Globulin     (2.3-3.5)  g/dL


 


Albumin/Globulin Ratio     (1.2-2.2)  











Jg Results Last 24 Hours: 


 Microbiology











 12/31/19 03:40 Clostridioides difficile (PCR) - Final





 Stool / Feces 


 


 12/30/19 19:19 Influenza Type A Antigen Screen - Final





 Nasal Aspirate, Unspecified    NEGATIVE INFLUENZA A VIRUS AG





    REFERENCE RANGE: NEGATIVE





 Influenza Type B Antigen Screen - Final





    NEGATIVE INFLUENZA B VIRUS AG





    REFERENCE RANGE: NEGATIVE











Med Orders - Current: 


 Current Medications





Acetaminophen (Tylenol)  650 mg PO Q4H PRN


   PRN Reason: Pain (Mild 1-3)/fever


Hydrocodone Bitart/Acetaminophen (Norco 325-5 Mg)  1 tab PO Q6H PRN


   PRN Reason: Pain


   Last Admin: 12/31/19 11:57 Dose:  1 tab


Albuterol (Proventil Neb Soln)  2.5 mg NEB Q2H PRN


   PRN Reason: Shortness of Breath


   Last Admin: 12/31/19 04:42 Dose:  2.5 mg


Albuterol/Ipratropium (Duoneb 3.0-0.5 Mg/3 Ml)  3 ml NEB QIDRT ABEL


   Last Admin: 12/31/19 14:51 Dose:  3 ml


Bisacodyl (Dulcolax)  5 mg PO DAILY PRN


   PRN Reason: Constipation


Bupropion HCl (Wellbutrin Sr)  150 mg PO BID [facility]


   Last Admin: 12/31/19 08:47 Dose:  150 mg


Clonazepam (Klonopin)  0.5 mg PO DAILY PRN


   PRN Reason: Anxiety


Docusate Sodium (Colace)  100 mg PO BID PRN


   PRN Reason: Constipation


Enoxaparin Sodium (Lovenox)  40 mg SUBCUT DAILY [facility]


   Last Admin: 12/31/19 08:48 Dose:  40 mg


Furosemide (Lasix)  40 mg PO DAILY PRN


   PRN Reason: Other


Azithromycin 500 mg/ Sodium (Chloride)  250 mls @ 250 mls/hr IV Q24H [facility]


   Last Admin: 12/30/19 21:12 Dose:  250 mls/hr


Ceftriaxone Sodium 1 gm/ (Sodium Chloride)  50 mls @ 100 mls/hr IV Q24H [facility]


   Last Admin: 12/30/19 20:45 Dose:  100 mls/hr


Lamotrigine (Lamotrigine)  50 mg PO DAILY [facility]


   Last Admin: 12/31/19 08:47 Dose:  50 mg


Lamotrigine (Lamotrigine)  100 mg PO DAILY [facility]


   Last Admin: 12/31/19 08:47 Dose:  100 mg


Levothyroxine Sodium (Levothyroxine)  25 mcg PO ACBREAKFAST@0700 [facility]


   Last Admin: 12/31/19 08:47 Dose:  25 mcg


Lorazepam (Ativan)  1 mg IV Q6H PRN


   PRN Reason: Nausea/Vomiting


Magnesium Oxide (Magnesium Oxide)  200 mg PO DAILY [facility]


   Last Admin: 12/31/19 08:48 Dose:  200 mg


Methylprednisolone Sodium Succinate (Solu-Medrol)  40 mg IVPUSH Q6H [facility]


   Last Admin: 12/31/19 16:03 Dose:  40 mg


Morphine Sulfate (Morphine)  2 mg IVPUSH Q4H PRN


   PRN Reason: Pain (severe 7-10)


Penciclovir (Denavir () 1% Crm**Pom**)  0 gm TP ASDIRECTED PRN


   PRN Reason: PRN


Ondansetron HCl (Zofran Odt)  4 mg PO Q6H PRN


   PRN Reason: Nausea able to take PO


Pantoprazole Sodium (Protonix***)  40 mg PO BIDAC [facility]


   Last Admin: 12/31/19 16:03 Dose:  40 mg


Potassium Chloride (Klor-Con M20)  40 meq PO ONETIME ONE


   Stop: 12/31/19 16:04


Potassium Chloride (Klor-Con M20)  40 meq PO ONETIME ONE


   Stop: 12/31/19 21:01


Sertraline HCl (Zoloft)  150 mg PO DAILY [facility]


   Last Admin: 12/31/19 08:48 Dose:  150 mg


Sodium Chloride (Saline Flush)  10 ml FLUSH ASDIRECTED PRN


   PRN Reason: Keep Vein Open


   Last Admin: 12/30/19 18:51 Dose:  10 ml


Tizanidine HCl (Zanaflex)  4 mg PO Q8H PRN


   PRN Reason: Muscle Spasm


Trazodone HCl (Trazodone)  50 - 100 mg PO BEDTIME PRN


   PRN Reason: Sleep


   Last Admin: 12/30/19 22:44 Dose:  50 mg


Triamcinolone Acetonide (Triamcinolone Acetonide 0.1% Crm)  0 gm TOP TID [facility]


   Last Admin: 12/31/19 14:25 Dose:  Not Given


Zolpidem Tartrate (Ambien)  5 mg PO BEDTIME PRN


   PRN Reason: Sleep





Discontinued Medications





Albuterol (Proventil Neb Soln)  2.5 mg NEB ONETIME STA


   Stop: 12/31/19 01:35


   Last Admin: 12/31/19 01:45 Dose:  2.5 mg


Albuterol/Ipratropium (Duoneb 3.0-0.5 Mg/3 Ml)  3 ml NEB ONETIME ONE


   Stop: 12/30/19 18:35


   Last Admin: 12/30/19 18:48 Dose:  3 ml


Bupropion HCl (Wellbutrin)  150 mg PO BID [facility]


   Last Admin: 12/30/19 22:44 Dose:  150 mg


Fentanyl (Sublimaze)  50 mcg IVPUSH ONETIME ONE


   Stop: 12/30/19 18:58


   Last Admin: 12/30/19 19:03 Dose:  50 mcg


Lactated Ringer's (Ringers, Lactated)  1,000 mls @ 500 mls/hr IV ASDIRECTED ONE


   Stop: 12/30/19 20:31


   Last Admin: 12/30/19 18:49 Dose:  500 mls/hr


Sodium Chloride (Normal Saline)  1,000 mls @ 125 mls/hr IV ASDIRECTED [facility]


   Last Infusion: 12/31/19 01:25 Dose:  75 mls/hr


Sodium Chloride (Normal Saline)  1,000 mls @ 75 mls/hr IV ASDIRECTED [facility]


Methylprednisolone Sodium Succinate (Solu-Medrol)  125 mg IVPUSH ONETIME ONE


   Stop: 12/30/19 21:54


   Last Admin: 12/30/19 22:23 Dose:  125 mg


Morphine Sulfate (Morphine)  2 mg IVPUSH ONETIME ONE


   Stop: 12/30/19 20:59


   Last Admin: 12/30/19 22:26 Dose:  Not Given


Pantoprazole Sodium (Protonix Granules***)  40 mg PO BIDAC [facility]


   Last Admin: 12/30/19 22:44 Dose:  40 mg











- Exam


Quality Assessment: DVT Prophylaxis


General: Alert, Oriented, Cooperative, Moderate Distress


Lungs: Decreased Breath Sounds, Rhonchi, Wheezing.  No: Rales, Rub


Cardiovascular: Regular Rate, Regular Rhythm, No Murmurs


GI/Abdominal Exam: Soft, Non-Tender, No Organomegaly, No Distention


Extremities: Non-Tender, No Pedal Edema





Sepsis Event Note





- Evaluation


Sepsis Screening Result: No Definite Risk





- Focused Exam


Vital Signs: 


 Vital Signs











  Temp Pulse Resp BP Pulse Ox


 


 12/31/19 15:00  96.6 F  74  18  119/63  88 L


 


 12/31/19 12:55      96


 


 12/31/19 11:41  96.5 F  79  20  122/64  93 L


 


 12/31/19 10:46   70   


 


 12/31/19 07:37  95.1 F L  71  20  111/50 L  88 L


 


 12/31/19 07:19      92 L


 


 12/31/19 07:09   77   











Date Exam was Performed: 12/31/19


Time Exam was Performed: 16:07





- Problem List Review


Problem List Initiated/Reviewed/Updated: Yes





- My Orders


Last 24 Hours: 


My Active Orders





01/01/20 05:11


POTASSIUM,K [CHEM] AM 





12/31/19 16:03


Potassium Chloride [Klor-Con M20]   40 meq PO ONETIME ONE 


Convert IV to Saline Lock [OM.PC] Routine 





12/31/19 21:00


Potassium Chloride [Klor-Con M20]   40 meq PO ONETIME ONE 














- Plan


Plan:: 





ASSESSMENT AND PLAN OF CARE: 





Pneumonia, with COPD exacerbation


-Saline lock IV


-IV Antibiotic; Rocephin 1 gram IV every 24 hours


-IV Zithromax 500mg every 24 hours     


-IV Solumedrol 40 mg every 6 hours


-schedule Duo nebs every 6 hours, Albuterol nebs every 4 hours prn             

       


-blood cultures x2 pending 





Hypoxic respiratory failure-secondary to pneumonia and COPD exacerbation


-Supplemental oxygen as needed





Right chronic leg ulcer


-daily dressing changes with non-stick dressing, 4x4 and coban. no tape


-Follow-up with Dr. Christine on Thursday, inpatient versus outpatient





Maintenance issues


-Orders home meds: chronic medication


-Nutrition: regular diet


-Pablo catheter -not indicated at this time


-DVT: Lovenox 30 units subcut.


-PPI; PO Protonix 40mg daily





CODE STATUS: FULL





Admission status: Admit to [address] [place] [address]


Admission justification.  This patient will be admitted for inpatient services 

and is medically appropriate meeting medical necessity for inpatient admission 

as outlined in my documentation.


I reasonably expect the patient will require inpatient services that span.  

Time over 2 midnights.  I reasonably expect this patient to be discharged or 

transferred within 96 hours after admission to the [facility] hospital.





Disposition: home with Family





Primary care provider: Dr. Schmidt, [facility]





Hospitalist: Dr. Canas

## 2020-01-01 RX ADMIN — ACETYLCYSTEINE SCH MG: 200 INHALANT RESPIRATORY (INHALATION) at 07:45

## 2020-01-01 RX ADMIN — BUPROPION HYDROCHLORIDE SCH MG: 150 TABLET, EXTENDED RELEASE ORAL at 08:39

## 2020-01-01 RX ADMIN — ACETYLCYSTEINE SCH MG: 200 INHALANT RESPIRATORY (INHALATION) at 13:12

## 2020-01-01 RX ADMIN — TRIAMCINOLONE ACETONIDE SCH: 1 CREAM TOPICAL at 20:49

## 2020-01-01 RX ADMIN — HYDROCODONE BITARTRATE AND ACETAMINOPHEN PRN TAB: 5; 325 TABLET ORAL at 08:35

## 2020-01-01 RX ADMIN — BUPROPION HYDROCHLORIDE SCH MG: 150 TABLET, EXTENDED RELEASE ORAL at 20:50

## 2020-01-01 RX ADMIN — METHYLPREDNISOLONE SODIUM SUCCINATE SCH MG: 40 INJECTION, POWDER, FOR SOLUTION INTRAMUSCULAR; INTRAVENOUS at 11:46

## 2020-01-01 RX ADMIN — ALBUTEROL SULFATE PRN MG: 2.5 SOLUTION RESPIRATORY (INHALATION) at 13:13

## 2020-01-01 RX ADMIN — HYDROCODONE BITARTRATE AND ACETAMINOPHEN PRN TAB: 5; 325 TABLET ORAL at 00:28

## 2020-01-01 RX ADMIN — TRIAMCINOLONE ACETONIDE SCH: 1 CREAM TOPICAL at 14:42

## 2020-01-01 RX ADMIN — ONDANSETRON PRN MG: 4 TABLET, ORALLY DISINTEGRATING ORAL at 19:43

## 2020-01-01 RX ADMIN — TRIAMCINOLONE ACETONIDE SCH: 1 CREAM TOPICAL at 08:39

## 2020-01-01 RX ADMIN — METHYLPREDNISOLONE SODIUM SUCCINATE SCH MG: 40 INJECTION, POWDER, FOR SOLUTION INTRAMUSCULAR; INTRAVENOUS at 05:02

## 2020-01-01 NOTE — PCM.PN
- General Info


Date of Service: 01/01/20


Subjective Update: 





No acute events overnight.  Still fairly short of breath but a little better 

today.  Cough is minimally productive but she does feel like there is sputum to 

be coughed up.  She did not have any fevers.  She continues to require 

supplemental oxygen.  She is moving around a little more each day but still 

very short of breath with activity.  She feels weak.  Appetite is not great.


Functional Status: Reports: Pain Controlled, Tolerating Diet





- Review of Systems


General: Reports: Weakness


Pulmonary: Reports: Shortness of Breath, Cough





- Patient Data


Vitals - Most Recent: 


 Last Vital Signs











Temp  35.4 C   01/01/20 11:52


 


Pulse  64   01/01/20 11:52


 


Resp  18   01/01/20 11:52


 


BP  127/61   01/01/20 11:52


 


Pulse Ox  92 L  01/01/20 12:30











Weight - Most Recent: 85.23 kg


I&O - Last 24 Hours: 


 Intake & Output











 12/31/19 01/01/20 01/01/20





 22:59 06:59 14:59


 


Intake Total 400  


 


Balance 400  











Lab Results Last 24 Hours: 


 Laboratory Results - last 24 hr











  01/01/20 Range/Units





  05:55 


 


Potassium  4.8  (3.6-5.2)  mmol/L











Med Orders - Current: 


 Current Medications





Acetaminophen (Tylenol)  650 mg PO Q4H PRN


   PRN Reason: Pain (Mild 1-3)/fever


   Last Admin: 01/01/20 11:46 Dose:  650 mg


Hydrocodone Bitart/Acetaminophen (Norco 325-5 Mg)  1 tab PO Q6H PRN


   PRN Reason: Pain


   Last Admin: 01/01/20 08:35 Dose:  1 tab


Albuterol (Proventil Neb Soln)  2.5 mg NEB Q2H PRN


   PRN Reason: Shortness of Breath


   Last Admin: 01/01/20 13:13 Dose:  2.5 mg


Albuterol/Ipratropium (Duoneb 3.0-0.5 Mg/3 Ml)  3 ml NEB QIDRT [facility]


   Last Admin: 01/01/20 11:19 Dose:  3 ml


Bisacodyl (Dulcolax)  5 mg PO DAILY PRN


   PRN Reason: Constipation


Bupropion HCl (Wellbutrin Sr)  150 mg PO BID [facility]


   Last Admin: 01/01/20 08:39 Dose:  150 mg


Clonazepam (Klonopin)  0.5 mg PO DAILY PRN


   PRN Reason: Anxiety


Docusate Sodium (Colace)  100 mg PO BID PRN


   PRN Reason: Constipation


Enoxaparin Sodium (Lovenox)  40 mg SUBCUT DAILY [facility]


   Last Admin: 01/01/20 08:38 Dose:  40 mg


Furosemide (Lasix)  40 mg PO DAILY PRN


   PRN Reason: Other


Azithromycin 500 mg/ Sodium (Chloride)  250 mls @ 250 mls/hr IV Q24H [facility]


   Last Admin: 12/31/19 20:58 Dose:  250 mls/hr


Ceftriaxone Sodium 1 gm/ (Sodium Chloride)  50 mls @ 100 mls/hr IV Q24H [facility]


   Last Admin: 12/31/19 19:56 Dose:  100 mls/hr


Lamotrigine (Lamotrigine)  50 mg PO DAILY [facility]


   Last Admin: 01/01/20 08:37 Dose:  50 mg


Lamotrigine (Lamotrigine)  100 mg PO DAILY [facility]


   Last Admin: 01/01/20 08:38 Dose:  100 mg


Levothyroxine Sodium (Levothyroxine)  25 mcg PO ACBREAKFAST@0700 [facility]


   Last Admin: 01/01/20 08:37 Dose:  25 mcg


Lorazepam (Ativan)  1 mg IV Q6H PRN


   PRN Reason: Nausea/Vomiting


Magnesium Oxide (Magnesium Oxide)  200 mg PO DAILY [facility]


   Last Admin: 01/01/20 08:38 Dose:  200 mg


Morphine Sulfate (Morphine)  2 mg IVPUSH Q4H PRN


   PRN Reason: Pain (severe 7-10)


Penciclovir (Denavir () 1% Crm**Pom**)  0 gm TP ASDIRECTED PRN


   PRN Reason: PRN


Ondansetron HCl (Zofran Odt)  4 mg PO Q6H PRN


   PRN Reason: Nausea able to take PO


   Last Admin: 12/31/19 22:38 Dose:  4 mg


Pantoprazole Sodium (Protonix***)  40 mg PO BIDAC [facility]


   Last Admin: 01/01/20 08:37 Dose:  40 mg


Sertraline HCl (Zoloft)  150 mg PO DAILY [facility]


   Last Admin: 01/01/20 08:39 Dose:  150 mg


Sodium Chloride (Saline Flush)  10 ml FLUSH ASDIRECTED PRN


   PRN Reason: Keep Vein Open


   Last Admin: 12/30/19 18:51 Dose:  10 ml


Tizanidine HCl (Zanaflex)  4 mg PO Q8H PRN


   PRN Reason: Muscle Spasm


Trazodone HCl (Trazodone)  50 - 100 mg PO BEDTIME PRN


   PRN Reason: Sleep


   Last Admin: 12/31/19 22:37 Dose:  50 mg


Triamcinolone Acetonide (Triamcinolone Acetonide 0.1% Crm)  0 gm TOP TID [facility]


   Last Admin: 01/01/20 08:39 Dose:  Not Given


Varenicline (Chantix)  1 mg PO BID [facility]


   Last Admin: 01/01/20 08:37 Dose:  1 mg


Zolpidem Tartrate (Ambien)  5 mg PO BEDTIME PRN


   PRN Reason: Sleep





Discontinued Medications





Acetylcysteine (Mucomyst 20%)  200 mg NEB TIDRT [facility]


   Last Admin: 01/01/20 13:12 Dose:  200 mg


Albuterol (Proventil Neb Soln)  2.5 mg NEB ONETIME STA


   Stop: 12/31/19 01:35


   Last Admin: 12/31/19 01:45 Dose:  2.5 mg


Albuterol/Ipratropium (Duoneb 3.0-0.5 Mg/3 Ml)  3 ml NEB ONETIME ONE


   Stop: 12/30/19 18:35


   Last Admin: 12/30/19 18:48 Dose:  3 ml


Bupropion HCl (Wellbutrin)  150 mg PO BID [facility]


   Last Admin: 12/30/19 22:44 Dose:  150 mg


Fentanyl (Sublimaze)  50 mcg IVPUSH ONETIME ONE


   Stop: 12/30/19 18:58


   Last Admin: 12/30/19 19:03 Dose:  50 mcg


Lactated Ringer's (Ringers, Lactated)  1,000 mls @ 500 mls/hr IV ASDIRECTED ONE


   Stop: 12/30/19 20:31


   Last Admin: 12/30/19 18:49 Dose:  500 mls/hr


Sodium Chloride (Normal Saline)  1,000 mls @ 125 mls/hr IV ASDIRECTED [facility]


   Last Infusion: 12/31/19 01:25 Dose:  75 mls/hr


Sodium Chloride (Normal Saline)  1,000 mls @ 75 mls/hr IV ASDIRECTED [facility]


Methylprednisolone Sodium Succinate (Solu-Medrol)  125 mg IVPUSH ONETIME ONE


   Stop: 12/30/19 21:54


   Last Admin: 12/30/19 22:23 Dose:  125 mg


Methylprednisolone Sodium Succinate (Solu-Medrol)  40 mg IVPUSH Q6H [facility]


   Last Admin: 01/01/20 11:46 Dose:  40 mg


Morphine Sulfate (Morphine)  2 mg IVPUSH ONETIME ONE


   Stop: 12/30/19 20:59


   Last Admin: 12/30/19 22:26 Dose:  Not Given


Pantoprazole Sodium (Protonix Granules***)  40 mg PO BIDAC [facility]


   Last Admin: 12/30/19 22:44 Dose:  40 mg


Potassium Chloride (Klor-Con M20)  40 meq PO ONETIME ONE


   Stop: 12/31/19 17:01


   Last Admin: 12/31/19 16:19 Dose:  40 meq


Potassium Chloride (Klor-Con M20)  40 meq PO ONETIME ONE


   Stop: 12/31/19 21:01


   Last Admin: 12/31/19 21:04 Dose:  40 meq











- Exam


Quality Assessment: Supplemental Oxygen


General: Alert, Oriented, Cooperative, No Acute Distress


Lungs: Normal Respiratory Effort, Rhonchi (moderate diffuse with expiration ), 

Wheezing (mild mid to end exp bilateral mid lung area )


Cardiovascular: Regular Rate, Regular Rhythm


GI/Abdominal Exam: Soft, No Distention


Extremities: No Pedal Edema.  No: Increased Warmth


Skin: Warm, Dry, Other (Allevyn dressing over ulcer right medial lower leg )


Psy/Mental Status: Alert, Normal Affect





Sepsis Event Note





- Evaluation


Sepsis Screening Result: No Definite Risk





- Focused Exam


Vital Signs: 


 Vital Signs











  Temp Pulse Resp BP BP Pulse Ox Pulse Ox


 


 01/01/20 12:30       92 L 


 


 01/01/20 11:52  35.4 C  64  18   127/61  93 L 


 


 01/01/20 11:19   57 L      95


 


 01/01/20 11:00        97


 


 01/01/20 08:27  35.3 C  62  16   143/62 H  93 L 


 


 01/01/20 07:48   56 L      94 L


 


 01/01/20 07:24       92 L 


 


 01/01/20 05:08  35.8 C  65  18  119/49 L   95 











Date Exam was Performed: 01/01/20


Time Exam was Performed: 15:12





- Problem List Review


Problem List Initiated/Reviewed/Updated: Yes





- My Orders


Last 24 Hours: 


My Active Orders





01/01/20 13:13


Discontinue Telemetry Monitoring [Cardiac Monitoring Discontinue] [RC] Click to 

Edit 





01/01/20 13:14


Benzonatate [Tessalon Perles]   100 mg PO TID PRN 


Lidocaine 2% [Xylocaine 2% Jelly]   1 ml TOP BID PRN 





01/01/20 13:15


guaiFENesin [Mucinex]   600 mg PO BID 





01/01/20 16:30


predniSONE   20 mg PO BIDAC 





01/02/20 05:00


BASIC METABOLIC PANEL,BMP [CHEM] Timed 


CBC W/O DIFF,HEMOGRAM [HEME] Timed (1) 














- Plan


Plan:: 





ASSESSMENT AND PLAN -





Pneumonia, with COPD exacerbation-still requiring supplemental oxygen but seems 

to be slowly improving.


-Saline lock IV


-Ceftriaxone 1 gram IV every 24 hours


-Azithromycin 500mg every 24 hours     


-transition to prednisone this afternoon


-schedule Duo nebs every 6 hours, Albuterol nebs every 4 hours prn             

       


-Follow-up blood cultures





Hypoxic respiratory failure-secondary to pneumonia and COPD exacerbation.  

Still requiring oxygen but slowly getting better.


-Supplemental oxygen as needed





Right chronic leg ulcer-no evidence for active infection.


-daily dressing changes with non-stick dressing, 4x4 and coban. no tape


-Follow-up with Dr. Christine on Thursday, inpatient versus outpatient





Maintenance issues


-Orders home meds: chronic medication


-Nutrition: regular diet


-DVT: Lovenox 30 units subcut.


-GI; PO Protonix 40mg daily





Disposition: home with Family





Phil Nelson MD

## 2020-01-02 RX ADMIN — BUPROPION HYDROCHLORIDE SCH MG: 150 TABLET, EXTENDED RELEASE ORAL at 08:00

## 2020-01-02 RX ADMIN — TRIAMCINOLONE ACETONIDE SCH: 1 CREAM TOPICAL at 08:02

## 2020-01-02 RX ADMIN — ONDANSETRON PRN MG: 4 TABLET, ORALLY DISINTEGRATING ORAL at 08:20

## 2020-01-02 RX ADMIN — BUPROPION HYDROCHLORIDE SCH MG: 150 TABLET, EXTENDED RELEASE ORAL at 22:09

## 2020-01-02 RX ADMIN — TRIAMCINOLONE ACETONIDE SCH: 1 CREAM TOPICAL at 13:13

## 2020-01-02 RX ADMIN — HYDROCODONE BITARTRATE AND ACETAMINOPHEN PRN TAB: 5; 325 TABLET ORAL at 02:37

## 2020-01-02 RX ADMIN — TRIAMCINOLONE ACETONIDE SCH: 1 CREAM TOPICAL at 22:08

## 2020-01-02 NOTE — PCM.PN
- General Info


Date of Service: 01/02/20


Subjective Update: 





There were no acute events overnight.  Patient still feels fairly short of 

breath, especially with activity.  Still has a harsh and mostly nonproductive 

cough.  Still very fatigued.  She has a mild generalized headache.  No fevers 

overnight.  Wound culture was obtained in the clinic earlier this week and 

culture has returned with MRSA.


Functional Status: Reports: Pain Controlled, Tolerating Diet





- Review of Systems


General: Reports: Weakness


Pulmonary: Reports: Shortness of Breath, Cough





- Patient Data


Vitals - Most Recent: 


 Last Vital Signs











Temp  35.8 C   01/02/20 10:47


 


Pulse  60   01/02/20 10:53


 


Resp  16   01/02/20 10:47


 


BP  125/58 L  01/02/20 10:47


 


Pulse Ox  90 L  01/02/20 10:47











Weight - Most Recent: 85.23 kg


I&O - Last 24 Hours: 


 Intake & Output











 01/01/20 01/02/20 01/02/20





 22:59 06:59 14:59


 


Intake Total 2190 200 1280


 


Balance 2190 200 1280











Lab Results Last 24 Hours: 


 Laboratory Results - last 24 hr











  01/02/20 01/02/20 Range/Units





  05:54 05:54 


 


WBC  9.9   (4.5-11.0)  K/uL


 


RBC  3.46   (3.30-5.50)  M/uL


 


Hgb  10.1 L   (12.0-15.0)  g/dL


 


Hct  32.9 L   (36.0-48.0)  %


 


MCV  95   (80-98)  fL


 


MCH  29   (27-31)  pg


 


MCHC  31 L   (32-36)  %


 


Plt Count  280   (150-400)  K/uL


 


Sodium   138 L  (140-148)  mmol/L


 


Potassium   4.6  (3.6-5.2)  mmol/L


 


Chloride   103  (100-108)  mmol/L


 


Carbon Dioxide   27  (21-32)  mmol/L


 


Anion Gap   12.6  (5.0-14.0)  mmol/L


 


BUN   14  (7-18)  mg/dL


 


Creatinine   0.8  (0.6-1.0)  mg/dL


 


Est Cr Clr Drug Dosing   56.93  mL/min


 


Estimated GFR (MDRD)   > 60  (>60)  


 


Glucose   123 H  ()  mg/dL


 


Calcium   8.3 L  (8.5-10.1)  mg/dL











Med Orders - Current: 


 Current Medications





Acetaminophen (Tylenol)  650 mg PO Q4H PRN


   PRN Reason: Pain (Mild 1-3)/fever


   Last Admin: 01/02/20 06:09 Dose:  650 mg


Hydrocodone Bitart/Acetaminophen (Norco 325-5 Mg)  1 tab PO Q6H PRN


   PRN Reason: Pain


   Last Admin: 01/02/20 02:37 Dose:  1 tab


Albuterol (Proventil Neb Soln)  2.5 mg NEB Q2H PRN


   PRN Reason: Shortness of Breath


   Last Admin: 01/01/20 13:13 Dose:  2.5 mg


Albuterol/Ipratropium (Duoneb 3.0-0.5 Mg/3 Ml)  3 ml NEB QIDRT [facility]


   Last Admin: 01/02/20 10:52 Dose:  3 ml


Benzonatate (Tessalon Perles)  100 mg PO TID PRN


   PRN Reason: Cough


   Last Admin: 01/02/20 02:38 Dose:  100 mg


Bisacodyl (Dulcolax)  5 mg PO DAILY PRN


   PRN Reason: Constipation


   Last Admin: 01/02/20 11:11 Dose:  5 mg


Bupropion HCl (Wellbutrin Sr)  150 mg PO BID [facility]


   Last Admin: 01/02/20 08:00 Dose:  150 mg


Clonazepam (Klonopin)  0.5 mg PO DAILY PRN


   PRN Reason: Anxiety


Docusate Sodium (Colace)  100 mg PO BID PRN


   PRN Reason: Constipation


Doxycycline Hyclate (Vibramycin)  100 mg PO BID [facility]


Enoxaparin Sodium (Lovenox)  40 mg SUBCUT DAILY [facility]


   Last Admin: 01/02/20 08:01 Dose:  40 mg


Furosemide (Lasix)  40 mg PO DAILY PRN


   PRN Reason: Other


Guaifenesin (Mucinex)  600 mg PO BID [facility]


   Last Admin: 01/02/20 08:00 Dose:  600 mg


Lamotrigine (Lamotrigine)  50 mg PO DAILY [facility]


   Last Admin: 01/02/20 08:00 Dose:  50 mg


Lamotrigine (Lamotrigine)  100 mg PO DAILY [facility]


   Last Admin: 01/02/20 08:00 Dose:  100 mg


Levothyroxine Sodium (Levothyroxine)  25 mcg PO ACBREAKFAST@0700 [facility]


   Last Admin: 01/02/20 07:48 Dose:  25 mcg


Lidocaine HCl (Xylocaine 2% Jelly)  0 ml TOP BID PRN


   PRN Reason: dressing changes


Lorazepam (Ativan)  1 mg IV Q6H PRN


   PRN Reason: Nausea/Vomiting


Magnesium Oxide (Magnesium Oxide)  200 mg PO DAILY [facility]


   Last Admin: 01/02/20 08:01 Dose:  200 mg


Morphine Sulfate (Morphine)  2 mg IVPUSH Q4H PRN


   PRN Reason: Pain (severe 7-10)


   Last Admin: 01/02/20 10:55 Dose:  2 mg


Penciclovir (Denavir () 1% Crm**Pom**)  0 gm TP ASDIRECTED PRN


   PRN Reason: PRN


Ondansetron HCl (Zofran Odt)  4 mg PO Q6H PRN


   PRN Reason: Nausea able to take PO


   Last Admin: 01/02/20 08:20 Dose:  4 mg


Pantoprazole Sodium (Protonix***)  40 mg PO BIDAC [facility]


   Last Admin: 01/02/20 07:48 Dose:  40 mg


Prednisone (Prednisone)  20 mg PO BIDMEALS [facility]


   Last Admin: 01/02/20 07:48 Dose:  20 mg


Sertraline HCl (Zoloft)  150 mg PO DAILY [facility]


   Last Admin: 01/02/20 08:00 Dose:  150 mg


Sodium Chloride (Saline Flush)  10 ml FLUSH ASDIRECTED PRN


   PRN Reason: Keep Vein Open


   Last Admin: 12/30/19 18:51 Dose:  10 ml


Tizanidine HCl (Zanaflex)  4 mg PO Q8H PRN


   PRN Reason: Muscle Spasm


   Last Admin: 01/01/20 23:25 Dose:  4 mg


Trazodone HCl (Trazodone)  50 - 100 mg PO BEDTIME PRN


   PRN Reason: Sleep


   Last Admin: 01/01/20 23:25 Dose:  50 mg


Triamcinolone Acetonide (Triamcinolone Acetonide 0.1% Crm)  0 gm TOP TID [facility]


   Last Admin: 01/02/20 08:02 Dose:  Not Given


Varenicline (Chantix)  1 mg PO BID [facility]


   Last Admin: 01/02/20 08:00 Dose:  1 mg


Zolpidem Tartrate (Ambien)  5 mg PO BEDTIME PRN


   PRN Reason: Sleep





Discontinued Medications





Acetylcysteine (Mucomyst 20%)  200 mg NEB TIDRT [facility]


   Last Admin: 01/01/20 13:12 Dose:  200 mg


Albuterol (Proventil Neb Soln)  2.5 mg NEB ONETIME STA


   Stop: 12/31/19 01:35


   Last Admin: 12/31/19 01:45 Dose:  2.5 mg


Albuterol/Ipratropium (Duoneb 3.0-0.5 Mg/3 Ml)  3 ml NEB ONETIME ONE


   Stop: 12/30/19 18:35


   Last Admin: 12/30/19 18:48 Dose:  3 ml


Bupropion HCl (Wellbutrin)  150 mg PO BID [facility]


   Last Admin: 12/30/19 22:44 Dose:  150 mg


Fentanyl (Sublimaze)  50 mcg IVPUSH ONETIME ONE


   Stop: 12/30/19 18:58


   Last Admin: 12/30/19 19:03 Dose:  50 mcg


Lactated Ringer's (Ringers, Lactated)  1,000 mls @ 500 mls/hr IV ASDIRECTED ONE


   Stop: 12/30/19 20:31


   Last Admin: 12/30/19 18:49 Dose:  500 mls/hr


Azithromycin 500 mg/ Sodium (Chloride)  250 mls @ 250 mls/hr IV Q24H [facility]


   Last Admin: 01/01/20 21:42 Dose:  250 mls/hr


Ceftriaxone Sodium 1 gm/ (Sodium Chloride)  50 mls @ 100 mls/hr IV Q24H [facility]


   Last Admin: 01/01/20 20:46 Dose:  100 mls/hr


Sodium Chloride (Normal Saline)  1,000 mls @ 125 mls/hr IV ASDIRECTED [facility]


   Last Infusion: 12/31/19 01:25 Dose:  75 mls/hr


Sodium Chloride (Normal Saline)  1,000 mls @ 75 mls/hr IV ASDIRECTED [facility]


Methylprednisolone Sodium Succinate (Solu-Medrol)  125 mg IVPUSH ONETIME ONE


   Stop: 12/30/19 21:54


   Last Admin: 12/30/19 22:23 Dose:  125 mg


Methylprednisolone Sodium Succinate (Solu-Medrol)  40 mg IVPUSH Q6H [facility]


   Last Admin: 01/01/20 11:46 Dose:  40 mg


Morphine Sulfate (Morphine)  2 mg IVPUSH ONETIME ONE


   Stop: 12/30/19 20:59


   Last Admin: 12/30/19 22:26 Dose:  Not Given


Pantoprazole Sodium (Protonix Granules***)  40 mg PO BIDAC [facility]


   Last Admin: 12/30/19 22:44 Dose:  40 mg


Potassium Chloride (Klor-Con M20)  40 meq PO ONETIME ONE


   Stop: 12/31/19 17:01


   Last Admin: 12/31/19 16:19 Dose:  40 meq


Potassium Chloride (Klor-Con M20)  40 meq PO ONETIME ONE


   Stop: 12/31/19 21:01


   Last Admin: 12/31/19 21:04 Dose:  40 meq











- Exam


Quality Assessment: Supplemental Oxygen


General: Alert, Oriented, Cooperative, No Acute Distress


Lungs: Normal Respiratory Effort, Rhonchi (mild diffuse), Wheezing (moderate 

right sided )


Cardiovascular: Regular Rate, Regular Rhythm


GI/Abdominal Exam: Soft, No Distention


Extremities: No Pedal Edema.  No: Increased Warmth


Skin: Warm, Dry


Psy/Mental Status: Alert, Normal Affect





Sepsis Event Note





- Evaluation


Sepsis Screening Result: No Definite Risk





- Focused Exam


Vital Signs: 


 Vital Signs











  Temp Pulse Resp BP Pulse Ox


 


 01/02/20 10:53   60   


 


 01/02/20 10:47  35.8 C  76  16  125/58 L  90 L


 


 01/02/20 07:49  35.5 C  60  20  150/65 H  94 L


 


 01/02/20 07:17   66   


 


 01/02/20 02:33  35.2 C  62  18  130/58 L  91 L











Date Exam was Performed: 01/02/20


Time Exam was Performed: 14:11





- Problem List Review


Problem List Initiated/Reviewed/Updated: Yes





- My Orders


Last 24 Hours: 


My Active Orders





01/01/20 13:14


Benzonatate [Tessalon Perles]   100 mg PO TID PRN 


Lidocaine 2% [Xylocaine 2% Jelly]   0 ml TOP BID PRN 





01/01/20 13:15


guaiFENesin [Mucinex]   600 mg PO BID 





01/01/20 17:00


predniSONE   20 mg PO BIDMEALS 





01/02/20 11:51


Isolation [COMM] Routine 





01/02/20 12:00


Doxycycline [Vibramycin]   100 mg PO BID 














- Plan


Plan:: 





ASSESSMENT AND PLAN -





Pneumonia, with COPD exacerbation-Still very short of breath with any activity 

and requiring more supplemental oxygen than usual.  Not much better today than 

yesterday but vital signs are stable and she is not having fevers.  With MRSA 

cultured from her lower extremity ulcer we will be switching antibiotics.


-Saline lock IV


-Change antibiotics to doxycycline


-Continue prednisone


-schedule Duo nebs every 6 hours, Albuterol nebs every 4 hours prn             

       


-Follow-up blood cultures





Hypoxic respiratory failure-secondary to pneumonia and COPD exacerbation.  

Still requiring oxygen but slowly getting better.


-Supplemental oxygen as needed





Right chronic leg ulcer-culture from the wound care clinic did grow out MRSA.  

Dr. Christine did see her today.


-daily dressing changes with non-stick dressing, 4x4 and coban. no tape


-Doxycycline twice daily x1 week





Maintenance issues


-Orders home meds: chronic medication


-Nutrition: regular diet


-DVT: Lovenox 30 units subcut.


-GI; PO Protonix 40mg daily





Disposition: home with Family





Phil Nelson MD

## 2020-01-03 RX ADMIN — TRIAMCINOLONE ACETONIDE SCH: 1 CREAM TOPICAL at 21:34

## 2020-01-03 RX ADMIN — BUPROPION HYDROCHLORIDE SCH MG: 150 TABLET, EXTENDED RELEASE ORAL at 21:31

## 2020-01-03 RX ADMIN — TRIAMCINOLONE ACETONIDE SCH: 1 CREAM TOPICAL at 15:28

## 2020-01-03 RX ADMIN — BUPROPION HYDROCHLORIDE SCH MG: 150 TABLET, EXTENDED RELEASE ORAL at 08:57

## 2020-01-03 RX ADMIN — TRIAMCINOLONE ACETONIDE SCH: 1 CREAM TOPICAL at 08:58

## 2020-01-03 RX ADMIN — HYDROCODONE BITARTRATE AND ACETAMINOPHEN PRN TAB: 5; 325 TABLET ORAL at 21:31

## 2020-01-03 NOTE — PCM.PN
- General Info


Date of Service: 01/03/20


Subjective Update: 





There were no acute events overnight.  The patient thinks her shortness of 

breath is slightly better today.  She does report some nausea this morning but 

has not had any vomiting.  She has been up and walking around and is moving 

better today but still feels short of breath with activity.  She continues to 

have a harsh cough but thinks this is slightly better today.  She has not had 

any fevers.  She does continue to require supplemental oxygen during the day 

which is unusual for her.  She is very concerned about the MRSA infection in 

her leg.


Functional Status: Reports: Pain Controlled, Tolerating Diet





- Review of Systems


General: Reports: Weakness.  Denies: Fever


Pulmonary: Reports: Shortness of Breath, Cough


Gastrointestinal: Reports: Nausea





- Patient Data


Vitals - Most Recent: 


 Last Vital Signs











Temp  35.6 C   01/03/20 11:00


 


Pulse  81   01/03/20 11:00


 


Resp  18   01/03/20 11:00


 


BP  122/46 L  01/03/20 11:00


 


Pulse Ox  95   01/03/20 11:00











Weight - Most Recent: 85.23 kg


I&O - Last 24 Hours: 


 Intake & Output











 01/02/20 01/03/20 01/03/20





 22:59 06:59 14:59


 


Intake Total 1200  560


 


Output Total   300


 


Balance 1200  260











Med Orders - Current: 


 Current Medications





Acetaminophen (Tylenol)  650 mg PO Q4H PRN


   PRN Reason: Pain (Mild 1-3)/fever


   Last Admin: 01/02/20 06:09 Dose:  650 mg


Hydrocodone Bitart/Acetaminophen (Norco 325-5 Mg)  1 tab PO Q6H PRN


   PRN Reason: Pain


   Last Admin: 01/02/20 02:37 Dose:  1 tab


Albuterol (Proventil Neb Soln)  2.5 mg NEB Q2H PRN


   PRN Reason: Shortness of Breath


   Last Admin: 01/01/20 13:13 Dose:  2.5 mg


Albuterol/Ipratropium (Duoneb 3.0-0.5 Mg/3 Ml)  3 ml NEB QIDRT ABEL


   Last Admin: 01/03/20 10:54 Dose:  3 ml


Benzonatate (Tessalon Perles)  100 mg PO TID PRN


   PRN Reason: Cough


   Last Admin: 01/02/20 02:38 Dose:  100 mg


Bisacodyl (Dulcolax)  5 mg PO DAILY PRN


   PRN Reason: Constipation


   Last Admin: 01/02/20 11:11 Dose:  5 mg


Bupropion HCl (Wellbutrin Sr)  150 mg PO BID [facility]


   Last Admin: 01/03/20 08:57 Dose:  150 mg


Clonazepam (Klonopin)  0.5 mg PO DAILY PRN


   PRN Reason: Anxiety


   Last Admin: 01/02/20 14:25 Dose:  0.5 mg


Docusate Sodium (Colace)  100 mg PO BID PRN


   PRN Reason: Constipation


   Last Admin: 01/03/20 09:12 Dose:  100 mg


Doxycycline Hyclate (Vibramycin)  100 mg PO BID [facility]


   Last Admin: 01/03/20 08:57 Dose:  100 mg


Enoxaparin Sodium (Lovenox)  40 mg SUBCUT DAILY [facility]


   Last Admin: 01/03/20 08:58 Dose:  40 mg


Furosemide (Lasix)  40 mg PO DAILY PRN


   PRN Reason: Other


Guaifenesin (Mucinex)  600 mg PO BID [facility]


   Last Admin: 01/03/20 08:57 Dose:  600 mg


Ibuprofen (Motrin)  600 mg PO Q6H PRN


   PRN Reason: Headache


   Last Admin: 01/03/20 09:12 Dose:  600 mg


Lamotrigine (Lamotrigine)  50 mg PO DAILY [facility]


   Last Admin: 01/03/20 08:54 Dose:  50 mg


Lamotrigine (Lamotrigine)  100 mg PO DAILY [facility]


   Last Admin: 01/03/20 08:55 Dose:  100 mg


Levothyroxine Sodium (Levothyroxine)  25 mcg PO ACBREAKFAST@0700 [facility]


   Last Admin: 01/03/20 08:53 Dose:  25 mcg


Lidocaine HCl (Xylocaine 2% Jelly)  0 ml TOP BID PRN


   PRN Reason: dressing changes


   Last Admin: 01/02/20 20:24 Dose:  1 applic


Lorazepam (Ativan)  0.5 mg IV Q6H PRN


   PRN Reason: Nausea/Vomiting


   Last Admin: 01/03/20 12:01 Dose:  0.5 mg


Magnesium Oxide (Magnesium Oxide)  200 mg PO DAILY [facility]


   Last Admin: 01/03/20 08:57 Dose:  200 mg


Morphine Sulfate (Morphine)  2 mg IVPUSH Q4H PRN


   PRN Reason: Pain (severe 7-10)


   Last Admin: 01/02/20 20:10 Dose:  2 mg


Penciclovir (Denavir () 1% Crm**Pom**)  0 gm TP ASDIRECTED PRN


   PRN Reason: PRN


Ondansetron HCl (Zofran Odt)  4 mg PO Q6H PRN


   PRN Reason: Nausea able to take PO


   Last Admin: 01/02/20 08:20 Dose:  4 mg


Pantoprazole Sodium (Protonix***)  40 mg PO BIDAC [facility]


   Last Admin: 01/03/20 08:53 Dose:  40 mg


Prednisone (Prednisone)  20 mg PO BIDMEALS [facility]


   Last Admin: 01/03/20 08:53 Dose:  20 mg


Sertraline HCl (Zoloft)  150 mg PO DAILY [facility]


   Last Admin: 01/03/20 08:58 Dose:  150 mg


Sodium Chloride (Saline Flush)  10 ml FLUSH ASDIRECTED PRN


   PRN Reason: Keep Vein Open


   Last Admin: 12/30/19 18:51 Dose:  10 ml


Tizanidine HCl (Zanaflex)  4 mg PO Q8H PRN


   PRN Reason: Muscle Spasm


   Last Admin: 01/01/20 23:25 Dose:  4 mg


Trazodone HCl (Trazodone)  50 - 100 mg PO BEDTIME PRN


   PRN Reason: Sleep


   Last Admin: 01/02/20 22:10 Dose:  50 mg


Triamcinolone Acetonide (Triamcinolone Acetonide 0.1% Crm)  0 gm TOP TID [facility]


   Last Admin: 01/03/20 08:58 Dose:  Not Given


Varenicline (Chantix)  1 mg PO BID [facility]


   Last Admin: 01/03/20 08:54 Dose:  1 mg


Zolpidem Tartrate (Ambien)  5 mg PO BEDTIME PRN


   PRN Reason: Sleep





Discontinued Medications





Acetylcysteine (Mucomyst 20%)  200 mg NEB TIDRT [facility]


   Last Admin: 01/01/20 13:12 Dose:  200 mg


Albuterol (Proventil Neb Soln)  2.5 mg NEB ONETIME STA


   Stop: 12/31/19 01:35


   Last Admin: 12/31/19 01:45 Dose:  2.5 mg


Albuterol/Ipratropium (Duoneb 3.0-0.5 Mg/3 Ml)  3 ml NEB ONETIME ONE


   Stop: 12/30/19 18:35


   Last Admin: 12/30/19 18:48 Dose:  3 ml


Bupropion HCl (Wellbutrin)  150 mg PO BID [facility]


   Last Admin: 12/30/19 22:44 Dose:  150 mg


Fentanyl (Sublimaze)  50 mcg IVPUSH ONETIME ONE


   Stop: 12/30/19 18:58


   Last Admin: 12/30/19 19:03 Dose:  50 mcg


Lactated Ringer's (Ringers, Lactated)  1,000 mls @ 500 mls/hr IV ASDIRECTED ONE


   Stop: 12/30/19 20:31


   Last Admin: 12/30/19 18:49 Dose:  500 mls/hr


Azithromycin 500 mg/ Sodium (Chloride)  250 mls @ 250 mls/hr IV Q24H [facility]


   Last Admin: 01/01/20 21:42 Dose:  250 mls/hr


Ceftriaxone Sodium 1 gm/ (Sodium Chloride)  50 mls @ 100 mls/hr IV Q24H [facility]


   Last Admin: 01/01/20 20:46 Dose:  100 mls/hr


Sodium Chloride (Normal Saline)  1,000 mls @ 125 mls/hr IV ASDIRECTED [facility]


   Last Infusion: 12/31/19 01:25 Dose:  75 mls/hr


Sodium Chloride (Normal Saline)  1,000 mls @ 75 mls/hr IV ASDIRECTED [facility]


Lidocaine HCl (Xylocaine 2% Jelly) Confirm Administered Dose 30 ml .ROUTE .STK-

MED ONE


   Stop: 01/02/20 20:12


Lorazepam (Ativan)  1 mg IV Q6H PRN


   PRN Reason: Nausea/Vomiting


Methylprednisolone Sodium Succinate (Solu-Medrol)  125 mg IVPUSH ONETIME ONE


   Stop: 12/30/19 21:54


   Last Admin: 12/30/19 22:23 Dose:  125 mg


Methylprednisolone Sodium Succinate (Solu-Medrol)  40 mg IVPUSH Q6H [facility]


   Last Admin: 01/01/20 11:46 Dose:  40 mg


Morphine Sulfate (Morphine)  2 mg IVPUSH ONETIME ONE


   Stop: 12/30/19 20:59


   Last Admin: 12/30/19 22:26 Dose:  Not Given


Pantoprazole Sodium (Protonix Granules***)  40 mg PO BIDAC [facility]


   Last Admin: 12/30/19 22:44 Dose:  40 mg


Potassium Chloride (Klor-Con M20)  40 meq PO ONETIME ONE


   Stop: 12/31/19 17:01


   Last Admin: 12/31/19 16:19 Dose:  40 meq


Potassium Chloride (Klor-Con M20)  40 meq PO ONETIME ONE


   Stop: 12/31/19 21:01


   Last Admin: 12/31/19 21:04 Dose:  40 meq











- Exam


Quality Assessment: Supplemental Oxygen


General: Alert, Oriented, Cooperative, No Acute Distress


Lungs: Normal Respiratory Effort, Rhonchi (mild diffuse ), Wheezing (moderate 

diffuse)


Cardiovascular: Regular Rate, Regular Rhythm


GI/Abdominal Exam: Soft, No Distention


Extremities: No Pedal Edema, Increased Warmth (mild around the dressing 

covering her right leg ulcer )


Skin: Warm, Dry


Psy/Mental Status: Alert, Normal Affect





Sepsis Event Note





- Evaluation


Sepsis Screening Result: No Definite Risk





- Focused Exam


Vital Signs: 


 Vital Signs











  Temp Pulse Resp BP Pulse Ox Pulse Ox


 


 01/03/20 11:00  35.6 C  81  18  122/46 L  95 


 


 01/03/20 10:54   73     95


 


 01/03/20 08:00       93 L


 


 01/03/20 07:38   67     91 L


 


 01/03/20 07:00  35.6 C  57 L  18  134/69  94 L 


 


 01/03/20 03:00  35.4 C  79  18  143/74 H  92 L 











Date Exam was Performed: 01/03/20


Time Exam was Performed: 15:38





- Problem List Review


Problem List Initiated/Reviewed/Updated: Yes





- My Orders


Last 24 Hours: 


My Active Orders





01/02/20 11:51


Isolation [COMM] Routine 





01/02/20 12:00


Doxycycline [Vibramycin]   100 mg PO BID 





01/02/20 13:34


Ibuprofen [Motrin]   600 mg PO Q6H PRN 





01/03/20 11:41


LORazepam [Ativan]   0.5 mg IV Q6H PRN 





01/03/20 12:08


RT Acapella [RESPCARE] Routine 





01/03/20 12:09


Dressing Change [Wound Care] [RC] Q3D 














- Plan


Plan:: 





ASSESSMENT AND PLAN -





Pneumonia, with COPD exacerbation-Still very short of breath with any activity 

and requiring more supplemental oxygen than usual.  Little better today.  

Tolerating antibiotics and steroids.  Hypoxic during the day but moving a 

little better.


-Saline lock IV


-Continue doxycycline


-Continue prednisone


-schedule Duo nebs every 6 hours, Albuterol nebs every 4 hours prn             

       


-Follow-up blood cultures





Hypoxic respiratory failure-secondary to pneumonia and COPD exacerbation.  

Still requiring oxygen during the day which is not normal for her but slowly 

getting better.


-Supplemental oxygen as needed





Right chronic leg ulcer-culture from the wound care clinic did grow out MRSA.  

Dr. Christine is following.


-Q3D dressing changes with Allevyn


-Doxycycline twice daily x1 week





Maintenance issues


-Orders home meds: chronic medication


-Nutrition: regular diet


-DVT: Lovenox 30 units subcut.


-GI; PO Protonix 40mg daily





Disposition: home with Family





Phil Nelson MD

## 2020-01-04 RX ADMIN — TRIAMCINOLONE ACETONIDE SCH: 1 CREAM TOPICAL at 09:30

## 2020-01-04 RX ADMIN — BUPROPION HYDROCHLORIDE SCH MG: 150 TABLET, EXTENDED RELEASE ORAL at 20:40

## 2020-01-04 RX ADMIN — ONDANSETRON PRN MG: 4 TABLET, ORALLY DISINTEGRATING ORAL at 21:05

## 2020-01-04 RX ADMIN — TRIAMCINOLONE ACETONIDE SCH: 1 CREAM TOPICAL at 20:41

## 2020-01-04 RX ADMIN — TRIAMCINOLONE ACETONIDE SCH: 1 CREAM TOPICAL at 14:31

## 2020-01-04 RX ADMIN — BUPROPION HYDROCHLORIDE SCH MG: 150 TABLET, EXTENDED RELEASE ORAL at 09:30

## 2020-01-04 RX ADMIN — HYDROCODONE BITARTRATE AND ACETAMINOPHEN PRN TAB: 5; 325 TABLET ORAL at 12:08

## 2020-01-04 RX ADMIN — HYDROCODONE BITARTRATE AND ACETAMINOPHEN PRN TAB: 5; 325 TABLET ORAL at 22:26

## 2020-01-04 NOTE — PN
DATE OF SERVICE:  01/04/2020

 

SUBJECTIVE:  The patient doing better.  The wound has improved.  Pain is controlled.  No

nausea, vomiting, shortness of breath, or chest pain.

 

There is good response to antibiotics.

 

OBJECTIVE:  Wound shows improvement today.

 

ASSESSMENT:  Right leg venous ulcer.

 

PLAN:  We do not want a compression wrapping at this time as we would like to observe this

for infection purposes.  However, overall subjectively and objectively, this wound is

improving.

 

 

 

 

Simeon Christine MD

DD:  01/04/2020 10:15:42

DT:  01/04/2020 11:21:51

Job #:  [id number]

## 2020-01-04 NOTE — PCM.PN
- General Info


Date of Service: 01/04/20


Subjective Update: 





No acute events overnight.  Nausea is better but not resolved.  No fevers.  

Still short of breath and still coughing but a little better today than 

yesterday.  Still short of breath with activity.  Still requiring supplemental 

oxygen during the day.  Still has some wheezing.  Wound care was performed with 

dressing change today.  Tolerating current antibiotics.


Functional Status: Reports: Pain Controlled, Tolerating Diet





- Review of Systems


General: Reports: Weakness.  Denies: Fever


Pulmonary: Reports: Shortness of Breath, Cough


Musculoskeletal: Reports: Leg Pain





- Patient Data


Vitals - Most Recent: 


 Last Vital Signs











Temp  35.4 C   01/04/20 07:21


 


Pulse  68   01/04/20 10:49


 


Resp  20   01/04/20 07:21


 


BP  164/78 H  01/04/20 07:21


 


Pulse Ox  94 L  01/04/20 08:00











Weight - Most Recent: 85.23 kg


I&O - Last 24 Hours: 


 Intake & Output











 01/03/20 01/04/20 01/04/20





 22:59 06:59 14:59


 


Intake Total 700  600


 


Balance 700  600











Med Orders - Current: 


 Current Medications





Acetaminophen (Tylenol)  650 mg PO Q4H PRN


   PRN Reason: Pain (Mild 1-3)/fever


   Last Admin: 01/03/20 12:15 Dose:  650 mg


Hydrocodone Bitart/Acetaminophen (Norco 325-5 Mg)  1 tab PO Q6H PRN


   PRN Reason: Pain


   Last Admin: 01/03/20 21:31 Dose:  1 tab


Albuterol (Proventil Neb Soln)  2.5 mg NEB Q2H PRN


   PRN Reason: Shortness of Breath


   Last Admin: 01/01/20 13:13 Dose:  2.5 mg


Albuterol/Ipratropium (Duoneb 3.0-0.5 Mg/3 Ml)  3 ml NEB QIDRT [facility]


   Last Admin: 01/04/20 10:48 Dose:  3 ml


Benzonatate (Tessalon Perles)  100 mg PO TID PRN


   PRN Reason: Cough


   Last Admin: 01/02/20 02:38 Dose:  100 mg


Bisacodyl (Dulcolax)  5 mg PO DAILY PRN


   PRN Reason: Constipation


   Last Admin: 01/02/20 11:11 Dose:  5 mg


Bupropion HCl (Wellbutrin Sr)  150 mg PO BID [facility]


   Last Admin: 01/04/20 09:30 Dose:  150 mg


Clonazepam (Klonopin)  0.5 mg PO DAILY PRN


   PRN Reason: Anxiety


   Last Admin: 01/02/20 14:25 Dose:  0.5 mg


Docusate Sodium (Colace)  100 mg PO BID PRN


   PRN Reason: Constipation


   Last Admin: 01/03/20 09:12 Dose:  100 mg


Doxycycline Hyclate (Vibramycin)  100 mg PO BID [facility]


   Last Admin: 01/04/20 09:28 Dose:  100 mg


Enoxaparin Sodium (Lovenox)  40 mg SUBCUT DAILY [facility]


   Last Admin: 01/04/20 09:29 Dose:  40 mg


Furosemide (Lasix)  40 mg PO DAILY PRN


   PRN Reason: Other


Guaifenesin (Mucinex)  600 mg PO BID [facility]


   Last Admin: 01/04/20 09:30 Dose:  600 mg


Ibuprofen (Motrin)  600 mg PO Q6H PRN


   PRN Reason: Headache


   Last Admin: 01/04/20 07:30 Dose:  600 mg


Lamotrigine (Lamotrigine)  50 mg PO DAILY [facility]


   Last Admin: 01/04/20 09:28 Dose:  50 mg


Lamotrigine (Lamotrigine)  100 mg PO DAILY [facility]


   Last Admin: 01/04/20 09:29 Dose:  100 mg


Levothyroxine Sodium (Levothyroxine)  25 mcg PO ACBREAKFAST@0700 [facility]


   Last Admin: 01/04/20 07:17 Dose:  25 mcg


Lidocaine HCl (Xylocaine 2% Jelly)  0 ml TOP BID PRN


   PRN Reason: dressing changes


   Last Admin: 01/03/20 21:32 Dose:  1 applic


Magnesium Oxide (Magnesium Oxide)  200 mg PO DAILY [facility]


   Last Admin: 01/04/20 09:29 Dose:  200 mg


Penciclovir (Denavir () 1% Crm**Pom**)  0 gm TP ASDIRECTED PRN


   PRN Reason: PRN


Ondansetron HCl (Zofran Odt)  4 mg PO Q6H PRN


   PRN Reason: Nausea able to take PO


   Last Admin: 01/02/20 08:20 Dose:  4 mg


[facility]


   Last Admin: 01/04/20 07:16 Dose:  40 mg


Prednisone (Prednisone)  20 mg PO BIDMEALS [facility]


   Last Admin: 01/04/20 09:29 Dose:  20 mg


Sertraline HCl (Zoloft)  150 mg PO DAILY [facility]


   Last Admin: 01/04/20 09:28 Dose:  150 mg


Sodium Chloride (Saline Flush)  10 ml FLUSH ASDIRECTED PRN


   PRN Reason: Keep Vein Open


   Last Admin: 12/30/19 18:51 Dose:  10 ml


Tizanidine HCl (Zanaflex)  4 mg PO Q8H PRN


   PRN Reason: Muscle Spasm


   Last Admin: 01/01/20 23:25 Dose:  4 mg


Trazodone HCl (Trazodone)  50 - 100 mg PO BEDTIME PRN


   PRN Reason: Sleep


   Last Admin: 01/03/20 21:32 Dose:  100 mg


Triamcinolone Acetonide (Triamcinolone Acetonide 0.1% Crm)  0 gm TOP TID [facility]


   Last Admin: 01/04/20 09:30 Dose:  Not Given


Varenicline (Chantix)  1 mg PO BID [facility]


   Last Admin: 01/04/20 09:28 Dose:  1 mg


Zolpidem Tartrate (Ambien)  5 mg PO BEDTIME PRN


   PRN Reason: Sleep





Discontinued Medications





Acetylcysteine (Mucomyst 20%)  200 mg NEB TIDRT [facility]


   Last Admin: 01/01/20 13:12 Dose:  200 mg


Albuterol (Proventil Neb Soln)  2.5 mg NEB ONETIME STA


   Stop: 12/31/19 01:35


   Last Admin: 12/31/19 01:45 Dose:  2.5 mg


Albuterol/Ipratropium (Duoneb 3.0-0.5 Mg/3 Ml)  3 ml NEB ONETIME ONE


   Stop: 12/30/19 18:35


   Last Admin: 12/30/19 18:48 Dose:  3 ml


Bupropion HCl (Wellbutrin)  150 mg PO BID [facility]


   Last Admin: 12/30/19 22:44 Dose:  150 mg


Fentanyl (Sublimaze)  50 mcg IVPUSH ONETIME ONE


   Stop: 12/30/19 18:58


   Last Admin: 12/30/19 19:03 Dose:  50 mcg


Lactated Ringer's (Ringers, Lactated)  1,000 mls @ 500 mls/hr IV ASDIRECTED ONE


   Stop: 12/30/19 20:31


   Last Admin: 12/30/19 18:49 Dose:  500 mls/hr


Azithromycin 500 mg/ Sodium (Chloride)  250 mls @ 250 mls/hr IV Q24H [facility]


   Last Admin: 01/01/20 21:42 Dose:  250 mls/hr


Ceftriaxone Sodium 1 gm/ (Sodium Chloride)  50 mls @ 100 mls/hr IV Q24H [facility]


   Last Admin: 01/01/20 20:46 Dose:  100 mls/hr


Sodium Chloride (Normal Saline)  1,000 mls @ 125 mls/hr IV ASDIRECTED [facility]


   Last Infusion: 12/31/19 01:25 Dose:  75 mls/hr


Sodium Chloride (Normal Saline)  1,000 mls @ 75 mls/hr IV ASDIRECTED [facility]


Lidocaine HCl (Xylocaine 2% Jelly) Confirm Administered Dose 30 ml .ROUTE .STK-

MED ONE


   Stop: 01/02/20 20:12


Lorazepam (Ativan)  1 mg IV Q6H PRN


   PRN Reason: Nausea/Vomiting


Lorazepam (Ativan)  0.5 mg IV Q6H PRN


   PRN Reason: Nausea/Vomiting


   Last Admin: 01/03/20 12:01 Dose:  0.5 mg


Methylprednisolone Sodium Succinate (Solu-Medrol)  125 mg IVPUSH ONETIME ONE


   Stop: 12/30/19 21:54


   Last Admin: 12/30/19 22:23 Dose:  125 mg


Methylprednisolone Sodium Succinate (Solu-Medrol)  40 mg IVPUSH Q6H [facility]


   Last Admin: 01/01/20 11:46 Dose:  40 mg


Morphine Sulfate (Morphine)  2 mg IVPUSH ONETIME ONE


   Stop: 12/30/19 20:59


   Last Admin: 12/30/19 22:26 Dose:  Not Given


Morphine Sulfate (Morphine)  2 mg IVPUSH Q4H PRN


   PRN Reason: Pain (severe 7-10)


   Last Admin: 01/02/20 20:10 Dose:  2 mg


Pantoprazole Sodium (Protonix Granules***)  40 mg PO BIDAC [facility]


   Last Admin: 12/30/19 22:44 Dose:  40 mg


Potassium Chloride (Klor-Con M20)  40 meq PO ONETIME ONE


   Stop: 12/31/19 17:01


   Last Admin: 12/31/19 16:19 Dose:  40 meq


Potassium Chloride (Klor-Con M20)  40 meq PO ONETIME ONE


   Stop: 12/31/19 21:01


   Last Admin: 12/31/19 21:04 Dose:  40 meq











- Exam


Quality Assessment: Supplemental Oxygen


General: Alert, Oriented, Cooperative, No Acute Distress


Lungs: Normal Respiratory Effort, Wheezing (moderate diffuse exp wheezing 

throughout )


Cardiovascular: Regular Rate, Regular Rhythm


GI/Abdominal Exam: Soft, No Distention


Extremities: No Pedal Edema


Wound/Incisions: No Drainage, Other (large wound right medial lower leg )


Psy/Mental Status: Alert, Normal Affect





Sepsis Event Note





- Evaluation


Sepsis Screening Result: No Definite Risk





- Focused Exam


Vital Signs: 


 Vital Signs











  Temp Pulse Resp BP Pulse Ox Pulse Ox


 


 01/04/20 10:49   68    


 


 01/04/20 08:00       94 L


 


 01/04/20 07:21  35.4 C  68  20  164/78 H  95 


 


 01/04/20 07:09   70    


 


 01/04/20 03:00  35.2 C L  20 L  18  151/68 H  94 L 











Date Exam was Performed: 01/04/20


Time Exam was Performed: 13:57





- Problem List Review


Problem List Initiated/Reviewed/Updated: Yes





- My Orders


Last 24 Hours: 


My Active Orders





01/03/20 12:08


RT Acapella [RESPCARE] Routine 





01/03/20 12:09


Dressing Change [Wound Care] [RC] Q3D 





01/03/20 12:12


Peripheral IV Discontinue [OM.PC] Routine 





01/05/20 09:00


predniSONE   20 mg PO DAILY 














- Plan


Plan:: 





ASSESSMENT AND PLAN -





Pneumonia, with COPD exacerbation-little less short of breath but still 

wheezing and still hypoxic beyond baseline.  More symptomatic with any activity 

but slowly getting better.


-Saline lock IV


-Continue doxycycline


-Continue prednisone


-schedule Duo nebs every 6 hours, Albuterol nebs every 4 hours prn             

       


-Follow-up blood cultures





Hypoxic respiratory failure-secondary to pneumonia and COPD exacerbation.  

Still requiring oxygen during the day which is not normal for her but slowly 

getting better.


-Supplemental oxygen as needed





Right chronic leg ulcer-culture from the wound care clinic did grow out MRSA.  

Dr. Christine is following.


-Q3D dressing changes with Allevyn


-Doxycycline twice daily x1 week





Maintenance issues


-Nutrition: regular diet


-DVT: Lovenox 30 units subcut.


-GI; PO Protonix 40mg daily





Disposition: home with Family, hopefully tomorrow





Phil Nelson MD

## 2020-01-04 NOTE — CONS
DATE OF SERVICE:  01/02/2020

 

REFERRING PHYSICIAN:

 

CONSULTING PHYSICIAN:  Simeon Christine MD

 

REASON FOR CONSULTATION:  Right leg wound.

 

HISTORY OF PRESENT ILLNESS:  A pleasant 64-year-old female who was admitted to the emergency

room due to shortness of breath and high sputum production.  This was modified by a history

of COPD.  Unfortunately, this appears to be an exacerbation of COPD due to pneumonia.  The

patient has been admitted to the hospitalist service for evaluation and management of this.

The second problem is she has a wound on her right leg, which has grown and become worse and

is concerning for infection, also modified by COPD.

 

PAST MEDICAL HISTORY:  Significant including cardiomyopathy, COPD as described above,

bronchitis, sleep apnea, history of bowel obstructions, GERD, hiatal hernia, diverticulosis,

recurrent bladder infections, bladder suspension, fibromyalgia, chronic pain, back pain,

knee pain, vertigo, depression, history of suicidal ideation, hypothyroidism, and

hysterectomy.

 

PAST SURGICAL HISTORY:  Hysterectomy as described above, hernia repair, multiple EGDs,

colonoscopy, and tongue biopsy.

 

SOCIAL HISTORY:  She is an active smoker.

 

FAMILY HISTORY:  Noncontributory.

 

REVIEW OF SYSTEMS:  GENERAL:  Appropriate for condition.

HEENT:  Active smoker.

PULMONARY:  As described above.

CARDIOVASCULAR:  Some shortness of breath.

GASTROINTESTINAL:  No issues, but history of reflux.

GENITOURINARY:  History of urinary tract infections, but no problems at this time.

MUSCULOSKELETAL:  Right leg pain.

SKIN:  Chronic venous ulcer.

 

PHYSICAL EXAMINATION:

VITAL SIGNS:  Temperature 95.9, blood pressure 150/65, pulse 60, respirations 20, and 94% on

2.5 L.

HEENT:  Pupils are equal.

NECK:  Supple.

LUNGS:  Poor inspiratory effort bilaterally.

ABDOMEN:  Nontender and nondistended.

EXTREMITIES:  She has range of motion.

SKIN:  Has a nonhealing venous ulcer concerning for infection, on her right leg.

 

ASSESSMENT:  Venous ulcer, right leg.

 

PLAN:  The patient had a culture of this wound recently in the clinic and we await the

results of this.  Aside from that, continue the Allevyn-type dressing.  She may shower, soap

and water to get in and on the wound.

 

 

 

 

Simeon Christine MD

DD:  01/04/2020 10:13:40

DT:  01/04/2020 11:34:31

Job #:  [id number]

## 2020-01-04 NOTE — PN
DATE OF SERVICE:  01/03/2020

 

SUBJECTIVE:  The patient is stable.  She did now have MRSA noted in her wound due to wound

culture at the clinic.

 

OBJECTIVE:  VITAL SIGNS:  Stable.

MUSCULOSKELETAL:  Wound shows slight improvement.

 

ASSESSMENT:  Right leg wound.

 

PLAN:  I did discuss with the hospitalist service, doxycycline will cover both the wound and

the pneumonia, therefore, we will switch to this.

 

 

 

 

Simeon Christine MD

DD:  01/04/2020 10:14:42

DT:  01/04/2020 10:31:16

Job #:  [id number]

## 2020-01-05 VITALS — HEART RATE: 70 BPM | SYSTOLIC BLOOD PRESSURE: 131 MMHG | DIASTOLIC BLOOD PRESSURE: 69 MMHG

## 2020-01-05 RX ADMIN — TRIAMCINOLONE ACETONIDE SCH: 1 CREAM TOPICAL at 09:30

## 2020-01-05 RX ADMIN — HYDROCODONE BITARTRATE AND ACETAMINOPHEN PRN TAB: 5; 325 TABLET ORAL at 04:13

## 2020-01-05 RX ADMIN — BUPROPION HYDROCHLORIDE SCH MG: 150 TABLET, EXTENDED RELEASE ORAL at 09:29

## 2020-01-05 NOTE — [id number]
**Discharge Summary





- Hospital Course


Brief History: 64-year-old female with history of severe emphysema with 

nocturnal oxygen dependence and chronic right leg ulcer who was sent from the 

wound care clinic for evaluation of increased cough and shortness of breath.  

She was admitted for management of right lower lobe pneumonia with COPD 

exacerbation.


Diagnosis: Stroke: No





- Discharge Data


Discharge Date: 01/05/20


Discharge Disposition: Home, Self-Care 01


Condition: Good





- Referral to Home Health


Primary Care Physician: 


Gabriel Schmidt MD








- Discharge Diagnosis/Problem(s)


(1) Pneumonia


SNOMED Code(s): 123445486


   ICD Code: J18.9 - PNEUMONIA, UNSPECIFIED ORGANISM   Status: Acute   Priority

: High   


Qualifiers: 


   Pneumonia type: due to unspecified organism   Laterality: right   Lung 

location: lower lobe of lung   Qualified Code(s): J18.9 - Pneumonia, 

unspecified organism   





(2) COPD exacerbation


SNOMED Code(s): 170850413


   ICD Code: J44.1 - CHRONIC OBSTRUCTIVE PULMONARY DISEASE W (ACUTE) 

EXACERBATION   Status: Acute   





(3) Chronic ulcer of leg


SNOMED Code(s): 44563408


   ICD Code: L97.909 - NON-PRS CHRONIC ULC UNSP PRT OF UNSP LOW LEG W UNSP 

SEVERITY   Status: Acute   Priority: Low   


Qualifiers: 


   Laterality: right   Non-pressure ulcer stage: with fat layer exposed   

Qualified Code(s): L97.912 - Non-pressure chronic ulcer of unspecified part of 

right lower leg with fat layer exposed   





- Patient Summary/Data


Consults: 


 Consultations





12/30/19 21:53


Wound Care Clinician Consult [Consult to Wound Care Services] [CONS] Routine 


   Comment: 


   Physician Instructions: 


   Reason for Consult: chronic wound right lower leg











Hospital Course: 





Bear was seen in the emergency room after being sent from the wound care clinic 

with concerns about increased shortness of breath and cough from baseline.  Work

-up in the emergency room was suggestive of a right lower lobe pneumonia with 

an acute exacerbation of her COPD.  She was started on broad-spectrum 

antibiotics as well as steroids and admitted to the hospital for further 

management.  Over the next couple of days her respiratory status did not 

improve very much.  She continued to require supplemental oxygen during the day 

and shortness of breath had not improved much.  Vital signs were otherwise 

stable.  Her wound culture from the clinic did return with MRSA.  Antibiotics 

were transitioned from ceftriaxone and a azithromycin to doxycycline.  Steroids 

were continued but she was transitioned to oral steroids.  At this time she was 

complaining of pain in the right leg.  Over the next couple of days she did 

have a fair amount of improvement in the redness and pain in the right leg.  

Dr. Christine did see her regarding wound care management and previous 

dressing change orders were continued.  The leg appeared to be improving each 

day.  Her respiratory status did start to improve with the change in 

antibiotics.  Shortness of breath has been steadily improving and she has been 

able to be up and moving around more each day.  She does continue to require 

some oxygen during the day but is slowly improving.  She does have home oxygen 

available.  Her wheezing has nearly resolved but a small amount remains.  She 

feels comfortable going home at this point with the improvement in her 

respiratory status.  Her leg is feeling much better.  The plan is for her to 

continue prednisone with a taper over the next 9 days.  She will continue 

doxycycline with the plan to complete an additional week for management of her 

chronic leg wound with an acute infection.  She was not interested in home care 

at the time of discharge.  She has follow-up with the wound care clinic in 2 

days.





- Patient Instructions


Diet: Regular Diet as Tolerated


Activity: As Tolerated


Showering/Bathing: May Shower


Notify Provider of: Fever, Increased Pain, Nausea and/or Vomiting


Other/Special Instructions: 1. You were in the hospital for management of right 

lower lung pneumonia complicated by an exacerbation of your COPD.  Your 

condition has been slowly improving with antibiotic therapy and steroids.  It 

is not entirely clear if this is a virus or a bacteria.  This will take a 

little while to continue to get better.  I recommend ongoing therapy with 

prednisone.  You should taper this medication over the next 9 days.  Please 

take 20 mg daily for 4 days (Monday through Thursday) and then 10 mg daily for 

5 days.  You should continue to use your nebulizer as previously prescribed.  

You may need to use your oxygen some during the day but this should be less and 

less over the next several days.  2. The culture of the wound on your right leg 

returned growing methicillin-resistant staph aureus (MRSA).  We are utilizing 

doxycycline to treat this infection.  You should take doxycycline 100 mg twice 

daily with food for 1 week after hospital discharge.  Your first dose outside 

of the hospital will be due tonight.  I recommend that you continue to use the 

Allevyn dressing that was applied here in the hospital.  It may remain in place 

until your follow-up with the wound care clinic.  3. Continue your other home 

medications as previously prescribed.  4. Follow up with the wound care clinic 

and Dr. Schmidt as previously scheduled.





- Discharge Plan


*PRESCRIPTION DRUG MONITORING PROGRAM REVIEWED*: Not Applicable


*COPY OF PRESCRIPTION DRUG MONITORING REPORT IN PATIENT KENISHA: Not Applicable


Prescriptions/Med Rec: 


Benzonatate 100 mg PO TID PRN #20 capsule


 PRN Reason: Cough


Doxycycline [Vibramycin] 100 mg PO BID #14 cap


predniSONE [Prednisone] 10 mg PO ASDIRECTED #13 tablet


Home Medications: 


 Home Meds





Levothyroxine Sodium [Synthroid] 25 mcg PO DAILY 01/29/15 [History]


Albuterol Sulfate [Albuterol Sulfate HFA] 2 puff INH Q6H PRN 05/21/15 [History]


Ondansetron HCl [Zofran] 4 mg PO Q4H PRN 05/21/15 [History]


Loratadine 10 mg PO DAILY 09/09/16 [History]


Furosemide 40 mg PO DAILY PRN 01/11/17 [History]


Triamcinolone Acetonide [Kenalog 0.1% Crm] 1 applic TOP TID 01/11/17 [History]


Albuterol/Ipratropium [DuoNeb 3.0-0.5 MG/3 ML] 3 ml IH Q4H PRN 07/10/17 [History

]


Magnesium Oxide [Magnesium] 250 mg PO ASDIRECTED 09/20/17 [History]


Sertraline HCl [Zoloft] 150 mg PO DAILY 09/20/17 [History]


lamoTRIgine [Lamotrigine] 150 mg PO DAILY 09/20/17 [History]


traZODone 1 - 2 tab PO BEDTIME PRN 09/20/17 [History]


ClonazePAM [KlonoPIN] 0.5 mg PO DAILY PRN 09/29/17 [History]


Penciclovir [Denavir 1% Crm] 1.5 gm TP ASDIRECTED 09/29/17 [History]


Mometasone Furoate [Elocon 0.1% Crm] 1 applic TOP DAILY 04/12/18 [History]


tiZANidine [Zanaflex] 4 mg PO Q8H PRN 04/12/18 [History]


Hydrocodone/Acetaminophen [Norco 5-325 Tablet] 1 each PO Q6HR PRN #28 tablet 07/ 09/19 [Rx]


Pantoprazole [ProTONIX***] 40 mg PO BID 12/31/19 [History]


buPROPion HCl [Wellbutrin SR] 150 mg PO BID 12/31/19 [History]


Benzonatate 100 mg PO TID PRN #20 capsule 01/05/20 [Rx]


Doxycycline [Vibramycin] 100 mg PO BID #14 cap 01/05/20 [Rx]


predniSONE [Prednisone] 10 mg PO ASDIRECTED #13 tablet 01/05/20 [Rx]








Oxygen Therapy Mode: Nasal Cannula


Patient Handouts:  Chronic Obstructive Pulmonary Disease Exacerbation, 

Doxycycline tablets or capsules


Referrals: 


Gabriel Schmidt MD [Primary Care Provider] -  (f/u as scheduled )


Simeon Christine MD [Physician] - 01/14/20 2:40 pm (Please arrive 15 minutes 

early to register for your appointments.)





- Discharge Summary/Plan Comment


DC Time >30 min.: No





- Patient Data


Vitals - Most Recent: 


 Last Vital Signs











Temp  35.7 C   01/05/20 08:21


 


Pulse  70   01/05/20 08:21


 


Resp  12   01/05/20 08:21


 


BP  131/69   01/05/20 08:21


 


Pulse Ox  90 L  01/05/20 08:21











Weight - Most Recent: 85.23 kg


I&O - Last 24 hours: 


 Intake & Output











 01/04/20 01/05/20 01/05/20





 22:59 06:59 14:59


 


Intake Total 800  


 


Balance 800  











ALLEY Results - Last 24 hrs: 


 Microbiology











 12/30/19 20:22 Aerobic Blood Culture - Final





 Blood - Venous - Lab Draw    NO GROWTH AFTER 5 DAYS





 Anaerobic Blood Culture - Final





    NO GROWTH AFTER 5 DAYS


 


 12/30/19 20:22 Aerobic Blood Culture - Final





 Blood - Venous    NO GROWTH AFTER 5 DAYS





 Anaerobic Blood Culture - Final





    NO GROWTH AFTER 5 DAYS











Med Orders - Current: 


 Current Medications





Acetaminophen (Tylenol)  650 mg PO Q4H PRN


   PRN Reason: Pain (Mild 1-3)/fever


   Last Admin: 01/04/20 14:45 Dose:  650 mg


Hydrocodone Bitart/Acetaminophen (Norco 325-5 Mg)  1 tab PO Q6H PRN


   PRN Reason: Pain


   Last Admin: 01/05/20 04:13 Dose:  1 tab


Albuterol (Proventil Neb Soln)  2.5 mg NEB Q2H PRN


   PRN Reason: Shortness of Breath


   Last Admin: 01/01/20 13:13 Dose:  2.5 mg


Albuterol/Ipratropium (Duoneb 3.0-0.5 Mg/3 Ml)  3 ml NEB QIDRT [facility]


   Last Admin: 01/05/20 10:57 Dose:  3 ml


Benzonatate (Tessalon Perles)  100 mg PO TID PRN


   PRN Reason: Cough


   Last Admin: 01/04/20 22:25 Dose:  100 mg


Bisacodyl (Dulcolax)  5 mg PO DAILY PRN


   PRN Reason: Constipation


   Last Admin: 01/02/20 11:11 Dose:  5 mg


Bupropion HCl (Wellbutrin Sr)  150 mg PO BID [facility]


   Last Admin: 01/05/20 09:29 Dose:  150 mg


Clonazepam (Klonopin)  0.5 mg PO DAILY PRN


   PRN Reason: Anxiety


   Last Admin: 01/02/20 14:25 Dose:  0.5 mg


Docusate Sodium (Colace)  100 mg PO BID PRN


   PRN Reason: Constipation


   Last Admin: 01/03/20 09:12 Dose:  100 mg


Doxycycline Hyclate (Vibramycin)  100 mg PO BID [facility]


   Last Admin: 01/05/20 09:29 Dose:  100 mg


Enoxaparin Sodium (Lovenox)  40 mg SUBCUT DAILY [facility]


   Last Admin: 01/05/20 09:28 Dose:  40 mg


Furosemide (Lasix)  40 mg PO DAILY PRN


   PRN Reason: Other


Guaifenesin (Mucinex)  600 mg PO BID [facility]


   Last Admin: 01/05/20 09:29 Dose:  600 mg


Ibuprofen (Motrin)  600 mg PO Q6H PRN


   PRN Reason: Headache


   Last Admin: 01/05/20 09:37 Dose:  600 mg


Lamotrigine (Lamotrigine)  50 mg PO DAILY [facility]


   Last Admin: 01/05/20 09:30 Dose:  50 mg


Lamotrigine (Lamotrigine)  100 mg PO DAILY [facility]


   Last Admin: 01/05/20 09:29 Dose:  100 mg


Levothyroxine Sodium (Levothyroxine)  25 mcg PO ACBREAKFAST@0700 [facility]


   Last Admin: 01/05/20 09:28 Dose:  25 mcg


Lidocaine HCl (Xylocaine 2% Jelly)  0 ml TOP BID PRN


   PRN Reason: dressing changes


   Last Admin: 01/03/20 21:32 Dose:  1 applic


Magnesium Oxide (Magnesium Oxide)  200 mg PO DAILY [facility]


   Last Admin: 01/05/20 09:29 Dose:  200 mg


Penciclovir (Denavir () 1% Crm**Pom**)  0 gm TP ASDIRECTED PRN


   PRN Reason: PRN


Ondansetron HCl (Zofran Odt)  4 mg PO Q6H PRN


   PRN Reason: Nausea able to take PO


   Last Admin: 01/04/20 21:05 Dose:  4 mg


Pantoprazole Sodium (Protonix***)  40 mg PO BIDAC [facility]


   Last Admin: 01/05/20 09:29 Dose:  40 mg


Prednisone (Prednisone)  20 mg PO DAILY [facility]


   Last Admin: 01/05/20 09:29 Dose:  20 mg


Sertraline HCl (Zoloft)  150 mg PO DAILY [facility]


   Last Admin: 01/05/20 09:29 Dose:  150 mg


Sodium Chloride (Saline Flush)  10 ml FLUSH ASDIRECTED PRN


   PRN Reason: Keep Vein Open


   Last Admin: 12/30/19 18:51 Dose:  10 ml


Tizanidine HCl (Zanaflex)  4 mg PO Q8H PRN


   PRN Reason: Muscle Spasm


   Last Admin: 01/01/20 23:25 Dose:  4 mg


Trazodone HCl (Trazodone)  50 - 100 mg PO BEDTIME PRN


   PRN Reason: Sleep


   Last Admin: 01/04/20 20:41 Dose:  100 mg


Triamcinolone Acetonide (Triamcinolone Acetonide 0.1% Crm)  0 gm TOP TID [facility]


   Last Admin: 01/05/20 09:30 Dose:  Not Given


Varenicline (Chantix)  1 mg PO BID [facility]


   Last Admin: 01/05/20 09:29 Dose:  1 mg


Zolpidem Tartrate (Ambien)  5 mg PO BEDTIME PRN


   PRN Reason: Sleep





Discontinued Medications





Acetylcysteine (Mucomyst 20%)  200 mg NEB TIDRT [facility]


   Last Admin: 01/01/20 13:12 Dose:  200 mg


Albuterol (Proventil Neb Soln)  2.5 mg NEB ONETIME STA


   Stop: 12/31/19 01:35


   Last Admin: 12/31/19 01:45 Dose:  2.5 mg


Albuterol/Ipratropium (Duoneb 3.0-0.5 Mg/3 Ml)  3 ml NEB ONETIME ONE


   Stop: 12/30/19 18:35


   Last Admin: 12/30/19 18:48 Dose:  3 ml


Bupropion HCl (Wellbutrin)  150 mg PO BID [facility]


   Last Admin: 12/30/19 22:44 Dose:  150 mg


Fentanyl (Sublimaze)  50 mcg IVPUSH ONETIME ONE


   Stop: 12/30/19 18:58


   Last Admin: 12/30/19 19:03 Dose:  50 mcg


Lactated Ringer's (Ringers, Lactated)  1,000 mls @ 500 mls/hr IV ASDIRECTED ONE


   Stop: 12/30/19 20:31


   Last Admin: 12/30/19 18:49 Dose:  500 mls/hr


Azithromycin 500 mg/ Sodium (Chloride)  250 mls @ 250 mls/hr IV Q24H [facility]


   Last Admin: 01/01/20 21:42 Dose:  250 mls/hr


Ceftriaxone Sodium 1 gm/ (Sodium Chloride)  50 mls @ 100 mls/hr IV Q24H [facility]


   Last Admin: 01/01/20 20:46 Dose:  100 mls/hr


Sodium Chloride (Normal Saline)  1,000 mls @ 125 mls/hr IV ASDIRECTED [facility]


   Last Infusion: 12/31/19 01:25 Dose:  75 mls/hr


Sodium Chloride (Normal Saline)  1,000 mls @ 75 mls/hr IV ASDIRECTED [facility]


Lidocaine HCl (Xylocaine 2% Jelly) Confirm Administered Dose 30 ml .ROUTE .STK-

MED ONE


   Stop: 01/02/20 20:12


Lorazepam (Ativan)  1 mg IV Q6H PRN


   PRN Reason: Nausea/Vomiting


Lorazepam (Ativan)  0.5 mg IV Q6H PRN


   PRN Reason: Nausea/Vomiting


   Last Admin: 01/03/20 12:01 Dose:  0.5 mg


Methylprednisolone Sodium Succinate (Solu-Medrol)  125 mg IVPUSH ONETIME ONE


   Stop: 12/30/19 21:54


   Last Admin: 12/30/19 22:23 Dose:  125 mg


Methylprednisolone Sodium Succinate (Solu-Medrol)  40 mg IVPUSH Q6H [facility]


   Last Admin: 01/01/20 11:46 Dose:  40 mg


Morphine Sulfate (Morphine)  2 mg IVPUSH ONETIME ONE


   Stop: 12/30/19 20:59


   Last Admin: 12/30/19 22:26 Dose:  Not Given


Morphine Sulfate (Morphine)  2 mg IVPUSH Q4H PRN


   PRN Reason: Pain (severe 7-10)


   Last Admin: 01/02/20 20:10 Dose:  2 mg


Pantoprazole Sodium (Protonix Granules***)  40 mg PO BIDAC [facility]


   Last Admin: 12/30/19 22:44 Dose:  40 mg


Potassium Chloride (Klor-Con M20)  40 meq PO ONETIME ONE


   Stop: 12/31/19 17:01


   Last Admin: 12/31/19 16:19 Dose:  40 meq


Potassium Chloride (Klor-Con M20)  40 meq PO ONETIME ONE


   Stop: 12/31/19 21:01


   Last Admin: 12/31/19 21:04 Dose:  40 meq


Prednisone (Prednisone)  20 mg PO BIDMEALS [facility]


   Last Admin: 01/04/20 09:29 Dose:  20 mg











- Exam


Quality Assessment: Reports: Supplemental Oxygen


General: Reports: Alert, Oriented, Cooperative, No Acute Distress


Lungs: Reports: Normal Respiratory Effort, Wheezing (milld/mod mid to end exp).

  Denies: Clear to Auscultation


Cardiovascular: Reports: Regular Rate, Regular Rhythm


Extremities: No Pedal Edema


Wound/Incisions: Reports: Dressing Dry and Intact


Psy/Mental Status: Reports: Alert, Normal Affect

## 2020-02-08 ENCOUNTER — HOSPITAL ENCOUNTER (EMERGENCY)
Dept: HOSPITAL 11 - JP.ED | Age: 65
LOS: 1 days | Discharge: HOME | End: 2020-02-09
Payer: MEDICARE

## 2020-02-08 VITALS — HEART RATE: 73 BPM | SYSTOLIC BLOOD PRESSURE: 153 MMHG | DIASTOLIC BLOOD PRESSURE: 85 MMHG

## 2020-02-08 DIAGNOSIS — F17.210: ICD-10-CM

## 2020-02-08 DIAGNOSIS — F41.9: ICD-10-CM

## 2020-02-08 DIAGNOSIS — Z79.899: ICD-10-CM

## 2020-02-08 DIAGNOSIS — E03.9: ICD-10-CM

## 2020-02-08 DIAGNOSIS — J44.9: ICD-10-CM

## 2020-02-08 DIAGNOSIS — F32.9: ICD-10-CM

## 2020-02-08 DIAGNOSIS — Z88.1: ICD-10-CM

## 2020-02-08 DIAGNOSIS — Z79.890: ICD-10-CM

## 2020-02-08 DIAGNOSIS — A08.4: Primary | ICD-10-CM

## 2020-02-08 DIAGNOSIS — K21.9: ICD-10-CM

## 2020-02-08 DIAGNOSIS — Z88.8: ICD-10-CM

## 2020-02-08 PROCEDURE — 36415 COLL VENOUS BLD VENIPUNCTURE: CPT

## 2020-02-08 PROCEDURE — 85027 COMPLETE CBC AUTOMATED: CPT

## 2020-02-08 PROCEDURE — 99284 EMERGENCY DEPT VISIT MOD MDM: CPT

## 2020-02-08 PROCEDURE — 86140 C-REACTIVE PROTEIN: CPT

## 2020-02-08 PROCEDURE — 96374 THER/PROPH/DIAG INJ IV PUSH: CPT

## 2020-02-08 PROCEDURE — 74019 RADEX ABDOMEN 2 VIEWS: CPT

## 2020-02-08 PROCEDURE — 80048 BASIC METABOLIC PNL TOTAL CA: CPT

## 2020-02-08 PROCEDURE — 81001 URINALYSIS AUTO W/SCOPE: CPT

## 2020-02-08 NOTE — EDM.PDOC
ED HPI GENERAL MEDICAL PROBLEM





- General


Chief Complaint: Abdominal Pain


Stated Complaint: STOMACH PAINS


Time Seen by Provider: 20 23:45


Source of Information: Reports: Patient, Old Records, RN


History Limitations: Reports: No Limitations





- History of Present Illness


INITIAL COMMENTS - FREE TEXT/NARRATIVE: 





65 yo female presents with diffuse abdominal pain that began earlier this 

afternoon. Pain waxes and wanes. Pain is increased with coughing. Her pain 

reminds her of diverticulitis she has had in the past. No fever. Has not had 

any recent hard stools, but rather they have been a bit loose lately. No 

nausea. Has not had any change in her sx's with eating or drinking today. Has 

had multiple surgeries in the past. Drove herself to the ER tonight. 


Onset: Today


Onset Date: 20


Onset Time: 15:00


Duration: Hour(s):, Waxing/Waning


Location: Reports: Abdomen


Quality: Reports: Ache


Severity: Moderate


Improves with: Reports: None


Worsens with: Reports: Other (unknown)


Context: Reports: Other (see HPI)


Associated Symptoms: Denies: Fever/Chills, Nausea/Vomiting


Treatments PTA: Reports: Other (see below) (none)


  ** Bilateral Abdomen


Pain Score (Numeric/FACES): 8





- Related Data


 Allergies











Allergy/AdvReac Type Severity Reaction Status Date / Time


 


ciprofloxacin [From Cipro] Allergy  Hives Verified 20 23:35


 


clonidine HCl [From Catapres] Allergy  Blisters Verified 20 23:35











Home Meds: 


 Home Meds





Levothyroxine Sodium [Synthroid] 25 mcg PO DAILY 01/29/15 [History]


Albuterol Sulfate [Albuterol Sulfate HFA] 2 puff INH Q6H PRN 05/21/15 [History]


Ondansetron HCl [Zofran] 4 mg PO Q4H PRN 05/21/15 [History]


Furosemide 40 mg PO DAILY PRN 17 [History]


Triamcinolone Acetonide [Kenalog 0.1% Crm] 1 applic TOP TID 17 [History]


Albuterol/Ipratropium [DuoNeb 3.0-0.5 MG/3 ML] 3 ml IH Q4H PRN 07/10/17 [History

]


Magnesium Oxide [Magnesium] 250 mg PO ASDIRECTED 17 [History]


Sertraline HCl [Zoloft] 150 mg PO DAILY 17 [History]


lamoTRIgine [Lamotrigine] 150 mg PO DAILY 17 [History]


traZODone 1 - 2 tab PO BEDTIME PRN 17 [History]


ClonazePAM [KlonoPIN] 0.5 mg PO DAILY PRN 17 [History]


Penciclovir [Denavir 1% Crm] 1.5 gm TP ASDIRECTED 17 [History]


Mometasone Furoate [Elocon 0.1% Crm] 1 applic TOP DAILY 18 [History]


tiZANidine [Zanaflex] 4 mg PO Q8H PRN 18 [History]


Pantoprazole [ProTONIX***] 40 mg PO BID 19 [History]


buPROPion HCl [Wellbutrin SR] 150 mg PO BID 19 [History]


Dicyclomine [Bentyl] 20 mg PO QIDACANDBED #20 tab 20 [Rx]











Past Medical History


HEENT History: Reports: Allergic Rhinitis, Impaired Vision


Cardiovascular History: Reports: Cardiomyopathy, SOB on Exertion


Respiratory History: Reports: Bronchitis, Recurrent, COPD, Sleep Apnea


Gastrointestinal History: Reports: Bowel Obstruction, Diverticulosis, GERD, 

Hiatal Hernia


Genitourinary History: Reports: UTI, Recurrent, Other (See Below)


Other Genitourinary History: bladder suspension


OB/GYN History: Reports: Pregnancy, Spontaneous 


Musculoskeletal History: Reports: Arthritis, Back Pain, Chronic, Fibromyalgia


Other Musculoskeletal History: right knee pain


Neurological History: Reports: Vertigo


Psychiatric History: Reports: Anxiety, Depression, Suicidal Ideation


Endocrine/Metabolic History: Reports: Hypothyroidism, Obesity/BMI 30+


Hematologic History: Reports: None


Immunologic History: Reports: None


Oncologic (Cancer) History: Reports: Cervix, Uterine


Other Oncologic History: complete hysterectomy 2015


Dermatologic History: Reports: Cellulitis


Other Dermatologic History: sore on right lower leg that won't go away





- Infectious Disease History


Infectious Disease History: Reports: Helicobacter Pylori, Measles, Mumps





- Past Surgical History


Head Surgeries/Procedures: Reports: None


HEENT Surgical History: Reports: Other (See Below)


Other HEENT Surgeries/Procedures: biopsy on tongue


Cardiovascular Surgical History: Reports: None


Respiratory Surgical History: Reports: None


GI Surgical History: Reports: Appendectomy, Colon, Colonoscopy, EGD, Hernia 

Repair/Other


Female  Surgical History: Reports: Hysterectomy, Salpingo-Oophorectomy


Endocrine Surgical History: Reports: None


Neurological Surgical History: Reports: None


Musculoskeletal Surgical History: Reports: None


Oncologic Surgical History: Reports: None


Dermatological Surgical History: Reports: None





Social & Family History





- Family History


Family Medical History: Noncontributory





- Tobacco Use


Smoking Status *Q: Current Every Day Smoker


Years of Tobacco use: 50


Packs/Tins Daily: 0.2





- Caffeine Use


Caffeine Use: Reports: Coffee, Soda





- Recreational Drug Use


Recreational Drug Use: No





- Living Situation & Occupation


Living situation: Reports:  (Lives with her 80+ mother, and her sister 

is in the next house. Has 1 son and 2 grandchildren , who she is currently 

estranged from her child and grandchildren.)





ED ROS GENERAL





- Review of Systems


Review Of Systems: See Below


Constitutional: Reports: No Symptoms


HEENT: Reports: No Symptoms


Respiratory: Reports: No Symptoms


Cardiovascular: Reports: No Symptoms


GI/Abdominal: Reports: Abdominal Pain, Diarrhea.  Denies: Black Stool, Bloody 

Stool, Constipation, Decreased Appetite, Distension, Flatus, Hematemesis, 

Hematochezia, Nausea, Vomiting


: Reports: No Symptoms


Musculoskeletal: Reports: No Symptoms


Skin: Reports: No Symptoms


Neurological: Reports: No Symptoms





ED EXAM, GI/ABD





- Physical Exam


Exam: See Below


Exam Limited By: No Limitations


General Appearance: Alert, WD/WN, No Apparent Distress


Eyes: Bilateral: Normal Appearance


Ears: Normal External Exam, Normal Canal, Hearing Grossly Normal


Nose: Normal Inspection, No Blood


Throat/Mouth: Normal Inspection, Normal Lips, Normal Oropharynx, Normal Voice, 

No Airway Compromise


Head: Atraumatic, Normocephalic


Neck: Normal Inspection


Respiratory/Chest: No Respiratory Distress, Lungs Clear, Normal Breath Sounds, 

No Accessory Muscle Use


Cardiovascular: Regular Rate, Rhythm, No Edema


GI/Abdominal Exam: Soft, No Distention, Tender (diffusely), Abnormal Bowel 

Sounds (increased).  No: Non-Tender, Distended, Guarding, Rigid, Rebound, Hernia


Extremities: Normal Inspection, Normal Range of Motion, Non-Tender, No Pedal 

Edema


Neurological: Alert, Oriented, CN II-XII Intact, Normal Cognition, No Motor/

Sensory Deficits


Psychiatric: Normal Affect, Normal Mood


Skin Exam: Warm, Dry, Intact, Normal Color, No Rash





Course





- Vital Signs


Last Recorded V/S: 


 Last Vital Signs











Temp  36.6 C   20 23:47


 


Pulse  73   20 23:47


 


Resp  16   20 23:47


 


BP  153/85 H  20 23:47


 


Pulse Ox  96   20 23:47














- Orders/Labs/Meds


Orders: 


 Active Orders 24 hr











 Category Date Time Status


 


 Abdomen 2V AP Flat Upright [CR] Stat Exams  20 00:15 Taken


 


 Sodium Chloride 0.9% [Saline Flush] Med  20 23:51 Active





 10 ml FLUSH ASDIRECTED PRN   


 


 Saline Lock Insert [OM.PC] Routine Oth  20 23:51 Ordered








 Medication Orders





Sodium Chloride (Saline Flush)  10 ml FLUSH ASDIRECTED PRN


   PRN Reason: Keep Vein Open


   Last Admin: 20 00:08  Dose: 10 ml








Labs: 


 Laboratory Tests











  20 Range/Units





  00:02 00:02 00:43 


 


WBC  8.5    (4.5-11.0)  K/uL


 


RBC  4.16    (3.30-5.50)  M/uL


 


Hgb  12.4  D    (12.0-15.0)  g/dL


 


Hct  39.4    (36.0-48.0)  %


 


MCV  95    (80-98)  fL


 


MCH  30    (27-31)  pg


 


MCHC  32    (32-36)  %


 


Plt Count  273    (150-400)  K/uL


 


Sodium   142   (140-148)  mmol/L


 


Potassium   3.6   (3.6-5.2)  mmol/L


 


Chloride   103   (100-108)  mmol/L


 


Carbon Dioxide   28   (21-32)  mmol/L


 


Anion Gap   11.4   (5.0-14.0)  mmol/L


 


BUN   13   (7-18)  mg/dL


 


Creatinine   1.2 H   (0.6-1.0)  mg/dL


 


Est Cr Clr Drug Dosing   36.21   mL/min


 


Estimated GFR (MDRD)   45 L   (>60)  


 


Glucose   115 H   ()  mg/dL


 


Calcium   8.5   (8.5-10.1)  mg/dL


 


C-Reactive Protein   0.96 H   (0.0-0.3)  mg/dL


 


Urine Color    Yellow  (YELLOW)  


 


Urine Appearance    Clear  (CLEAR)  


 


Urine pH    5.5  (5.0-8.0)  


 


Ur Specific Gravity    1.020  (1.008-1.030)  


 


Urine Protein    Negative  (NEGATIVE)  mg/dL


 


Urine Glucose (UA)    Negative  (NEGATIVE)  mg/dL


 


Urine Ketones    Negative  (NEGATIVE)  mg/dL


 


Urine Occult Blood    Negative  (NEGATIVE)  


 


Urine Nitrite    Negative  (NEGATIVE)  


 


Urine Bilirubin    Negative  (NEGATIVE)  


 


Urine Urobilinogen    0.2  (0.2-1.0)  EU/dL


 


Ur Leukocyte Esterase    Trace H  (NEGATIVE)  


 


Urine RBC    0-5  (0-5)  


 


Urine WBC    0-5  (0-5)  


 


Ur Epithelial Cells    Rare  


 


Amorphous Sediment    Not seen  


 


Urine Bacteria    Rare  


 


Urine Mucus    Few  











Meds: 


Medications











Generic Name Dose Route Start Last Admin





  Trade Name Freq  PRN Reason Stop Dose Admin


 


Sodium Chloride  10 ml  20 23:51  20 00:08





  Saline Flush  FLUSH   10 ml





  ASDIRECTED PRN   Administration





  Keep Vein Open   





     





     





     














Discontinued Medications














Generic Name Dose Route Start Last Admin





  Trade Name Freq  PRN Reason Stop Dose Admin


 


Acetaminophen  1,000 mg  20 23:52  20 00:04





  Tylenol Extra Strength  PO  20 23:53  1,000 mg





  ONETIME ONE   Administration





     





     





     





     


 


Dicyclomine HCl  30 mg  20 00:59  





  Bentyl  PO  20 01:00  





  ONETIME ONE   





     





     





     





     


 


Ketorolac Tromethamine  30 mg  20 23:51  20 00:05





  Toradol  IVPUSH  20 23:52  30 mg





  ONETIME ONE   Administration





     





     





     





     














- Radiology Interpretation


Free Text/Narrative:: 





Flat/upright abdominal X-rays-nonspecific gas pattern, no increase in stool. 





Departure





- Departure


Time of Disposition: 01:10


Disposition: Home, Self-Care 01


Condition: Fair


Clinical Impression: 


 Viral intestinal infection








- Discharge Information


*PRESCRIPTION DRUG MONITORING PROGRAM REVIEWED*: No


*COPY OF PRESCRIPTION DRUG MONITORING REPORT IN PATIENT KENISHA: No


Prescriptions: 


Dicyclomine [Bentyl] 20 mg PO QIDACANDBED #20 tab


Referrals: 


Gabriel Schmidt MD [Primary Care Provider] - 


Forms:  ED Department Discharge


Additional Instructions: 


Use dicyclomine and acetaminophen as needed for pain relief. Eat a diet of 

foods that are easily digested. Recheck if worse or not improving. 





Sepsis Event Note





- Focused Exam


Vital Signs: 


 Vital Signs











  Temp Pulse Resp BP Pulse Ox


 


 20 23:47  36.6 C  73  16  153/85 H  96











Date Exam was Performed: 20


Time Exam was Performed: 01:01





- My Orders


Last 24 Hours: 


My Active Orders





20 23:51


Sodium Chloride 0.9% [Saline Flush]   10 ml FLUSH ASDIRECTED PRN 


Saline Lock Insert [OM.PC] Routine 





20 00:15


Abdomen 2V AP Flat Upright [CR] Stat 














- Assessment/Plan


Last 24 Hours: 


My Active Orders





20 23:51


Sodium Chloride 0.9% [Saline Flush]   10 ml FLUSH ASDIRECTED PRN 


Saline Lock Insert [OM.PC] Routine 





20 00:15


Abdomen 2V AP Flat Upright [CR] Stat

## 2020-02-10 NOTE — CR
Abdomen 2V AP Flat Upright

 

CLINICAL HISTORY: A dominant pain 

 

FINDINGS: Small intestinal gas pattern is nonacute. No free air is identified.

Patient has had previous abdominal surgery for apparent ventral hernia repair.

There is minimal air in the transverse colon. Pattern may represent some thumb

printing.

 

IMPRESSION: Nonacute small intestinal gas pattern

 

Possible mucosal thickening in transverse colon

 

Previous ventral hernia repair.

## 2020-03-09 ENCOUNTER — HOSPITAL ENCOUNTER (INPATIENT)
Dept: HOSPITAL 11 - JP.SDS | Age: 65
LOS: 4 days | Discharge: HOME | DRG: 418 | End: 2020-03-13
Attending: SURGERY | Admitting: SURGERY
Payer: MEDICARE

## 2020-03-09 DIAGNOSIS — M15.0: ICD-10-CM

## 2020-03-09 DIAGNOSIS — Z79.890: ICD-10-CM

## 2020-03-09 DIAGNOSIS — K80.10: Primary | ICD-10-CM

## 2020-03-09 DIAGNOSIS — B34.9: ICD-10-CM

## 2020-03-09 DIAGNOSIS — Z79.899: ICD-10-CM

## 2020-03-09 DIAGNOSIS — L97.919: ICD-10-CM

## 2020-03-09 DIAGNOSIS — F17.210: ICD-10-CM

## 2020-03-09 PROCEDURE — C9113 INJ PANTOPRAZOLE SODIUM, VIA: HCPCS

## 2020-03-09 PROCEDURE — 0FT44ZZ RESECTION OF GALLBLADDER, PERCUTANEOUS ENDOSCOPIC APPROACH: ICD-10-PCS | Performed by: SURGERY

## 2020-03-09 RX ADMIN — BUPROPION HYDROCHLORIDE SCH MG: 150 TABLET, EXTENDED RELEASE ORAL at 21:08

## 2020-03-09 RX ADMIN — FLUTICASONE PROPIONATE AND SALMETEROL SCH: 113; 14 POWDER, METERED RESPIRATORY (INHALATION) at 22:12

## 2020-03-09 RX ADMIN — HYDROCODONE BITARTRATE AND ACETAMINOPHEN PRN TAB: 5; 325 TABLET ORAL at 21:07

## 2020-03-09 RX ADMIN — FLUTICASONE PROPIONATE AND SALMETEROL SCH PUFF: 113; 14 POWDER, METERED RESPIRATORY (INHALATION) at 21:07

## 2020-03-09 NOTE — CR
CHEST: 2 view

 

CLINICAL HISTORY:Preop

 

COMPARISON:12/30/2019

 

FINDINGS:  Heart is mildly enlarged. Pulmonary vascularity is normal. Lungs are

hyperaerated. No infiltrates are seen. There are atherosclerotic changes in the

aorta.

 

Impression: Mild cardiomegaly

 

No acute cardiopulmonary process

 

Mild hyperaeration.

## 2020-03-10 RX ADMIN — HYDROCODONE BITARTRATE AND ACETAMINOPHEN PRN TAB: 5; 325 TABLET ORAL at 06:40

## 2020-03-10 RX ADMIN — HYDROCODONE BITARTRATE AND ACETAMINOPHEN PRN TAB: 5; 325 TABLET ORAL at 20:17

## 2020-03-10 RX ADMIN — FLUTICASONE PROPIONATE AND SALMETEROL SCH PUFF: 113; 14 POWDER, METERED RESPIRATORY (INHALATION) at 06:57

## 2020-03-10 RX ADMIN — BUPROPION HYDROCHLORIDE SCH MG: 150 TABLET, EXTENDED RELEASE ORAL at 20:55

## 2020-03-10 RX ADMIN — BUPROPION HYDROCHLORIDE SCH MG: 150 TABLET, EXTENDED RELEASE ORAL at 08:39

## 2020-03-10 RX ADMIN — FLUTICASONE PROPIONATE AND SALMETEROL SCH PUFF: 113; 14 POWDER, METERED RESPIRATORY (INHALATION) at 20:55

## 2020-03-10 RX ADMIN — HYDROCODONE BITARTRATE AND ACETAMINOPHEN PRN TAB: 5; 325 TABLET ORAL at 10:45

## 2020-03-10 RX ADMIN — HYDROCODONE BITARTRATE AND ACETAMINOPHEN PRN TAB: 5; 325 TABLET ORAL at 14:49

## 2020-03-11 RX ADMIN — HYDROCODONE BITARTRATE AND ACETAMINOPHEN PRN TAB: 5; 325 TABLET ORAL at 00:31

## 2020-03-11 RX ADMIN — BUPROPION HYDROCHLORIDE SCH MG: 150 TABLET, EXTENDED RELEASE ORAL at 20:00

## 2020-03-11 RX ADMIN — BUPROPION HYDROCHLORIDE SCH MG: 150 TABLET, EXTENDED RELEASE ORAL at 09:32

## 2020-03-11 RX ADMIN — HYDROCODONE BITARTRATE AND ACETAMINOPHEN PRN TAB: 5; 325 TABLET ORAL at 11:25

## 2020-03-11 RX ADMIN — HYDROCODONE BITARTRATE AND ACETAMINOPHEN PRN TAB: 5; 325 TABLET ORAL at 07:20

## 2020-03-11 RX ADMIN — POTASSIUM CHLORIDE SCH MEQ: 1500 TABLET, EXTENDED RELEASE ORAL at 16:56

## 2020-03-11 RX ADMIN — FLUTICASONE PROPIONATE AND SALMETEROL SCH PUFF: 113; 14 POWDER, METERED RESPIRATORY (INHALATION) at 20:02

## 2020-03-11 RX ADMIN — HYDROCODONE BITARTRATE AND ACETAMINOPHEN PRN TAB: 5; 325 TABLET ORAL at 21:48

## 2020-03-11 RX ADMIN — MAGNESIUM SULFATE IN WATER SCH MLS/HR: 40 INJECTION, SOLUTION INTRAVENOUS at 08:15

## 2020-03-11 RX ADMIN — MAGNESIUM SULFATE IN WATER SCH MLS/HR: 40 INJECTION, SOLUTION INTRAVENOUS at 21:02

## 2020-03-11 RX ADMIN — HYDROCODONE BITARTRATE AND ACETAMINOPHEN PRN TAB: 5; 325 TABLET ORAL at 04:14

## 2020-03-11 RX ADMIN — FLUTICASONE PROPIONATE AND SALMETEROL SCH PUFF: 113; 14 POWDER, METERED RESPIRATORY (INHALATION) at 06:58

## 2020-03-11 RX ADMIN — MAGNESIUM SULFATE IN WATER SCH: 40 INJECTION, SOLUTION INTRAVENOUS at 09:30

## 2020-03-11 RX ADMIN — POTASSIUM CHLORIDE SCH MEQ: 1500 TABLET, EXTENDED RELEASE ORAL at 09:32

## 2020-03-11 RX ADMIN — MAGNESIUM SULFATE IN WATER SCH MLS/HR: 40 INJECTION, SOLUTION INTRAVENOUS at 15:10

## 2020-03-11 RX ADMIN — HYDROCODONE BITARTRATE AND ACETAMINOPHEN PRN TAB: 5; 325 TABLET ORAL at 17:24

## 2020-03-11 NOTE — PN
DATE OF SERVICE:  03/11/2020

 

The patient had T-max of 100.3 last night.  Vital signs were otherwise stable.  Temperature

did come down with some additional pulmonary toilet.  Historically, this is __________ the

patient gets into trouble with her pulmonary status.  We will keep her in ICU today.  Her

BNP is up quite a bit from 267 on 03/09/2020 to 1764 today, so we will give her IV Lasix 40

mg now, and she will also get her daily oral dose later this morning.  We will supplement

her with potassium orally today as well as magnesium is somewhat low.  Otherwise, we will

remove the MAYA drain and maximize activity and pulmonary toilet.

 

 

 

 

Arian August MD

DD:  03/11/2020 07:14:09

DT:  03/11/2020 13:49:19

Job #:  [id number]

## 2020-03-11 NOTE — PN
DATE OF SERVICE:  03/10/2020

 

The patient has been afebrile with stable vital signs.  Status post total laparoscopic

cholecystectomy yesterday.  From a pulmonary standpoint, she is doing well.  She finds O2

saturations often in the 80s, but that is her baseline.  She has some productive cough, but

overall looks fairly good in terms of better than seen previously with abdominal surgeries.

We will give IV to keep open.  Her oral intake is fairly good.  Recheck some labs.  Slowly

maximize activity.  Work with pulmonary toilet.  She will be going up to a regular diet

today.

 

 

 

 

Arian August MD

DD:  03/10/2020 06:28:22

DT:  03/11/2020 13:37:56

Job #:  [id number]

## 2020-03-12 VITALS — SYSTOLIC BLOOD PRESSURE: 99 MMHG | DIASTOLIC BLOOD PRESSURE: 67 MMHG

## 2020-03-12 RX ADMIN — BUPROPION HYDROCHLORIDE SCH MG: 150 TABLET, EXTENDED RELEASE ORAL at 20:33

## 2020-03-12 RX ADMIN — MAGNESIUM SULFATE IN WATER SCH MLS/HR: 40 INJECTION, SOLUTION INTRAVENOUS at 06:26

## 2020-03-12 RX ADMIN — FLUTICASONE PROPIONATE AND SALMETEROL SCH PUFF: 113; 14 POWDER, METERED RESPIRATORY (INHALATION) at 20:33

## 2020-03-12 RX ADMIN — FLUTICASONE PROPIONATE AND SALMETEROL SCH PUFF: 113; 14 POWDER, METERED RESPIRATORY (INHALATION) at 08:03

## 2020-03-12 RX ADMIN — HYDROCODONE BITARTRATE AND ACETAMINOPHEN PRN TAB: 5; 325 TABLET ORAL at 08:26

## 2020-03-12 RX ADMIN — BUPROPION HYDROCHLORIDE SCH MG: 150 TABLET, EXTENDED RELEASE ORAL at 08:15

## 2020-03-12 RX ADMIN — HYDROCODONE BITARTRATE AND ACETAMINOPHEN PRN TAB: 5; 325 TABLET ORAL at 15:38

## 2020-03-12 RX ADMIN — HYDROCODONE BITARTRATE AND ACETAMINOPHEN PRN TAB: 5; 325 TABLET ORAL at 22:57

## 2020-03-13 VITALS — HEART RATE: 81 BPM

## 2020-03-13 RX ADMIN — FLUTICASONE PROPIONATE AND SALMETEROL SCH PUFF: 113; 14 POWDER, METERED RESPIRATORY (INHALATION) at 10:23

## 2020-03-13 RX ADMIN — BUPROPION HYDROCHLORIDE SCH MG: 150 TABLET, EXTENDED RELEASE ORAL at 10:09

## 2020-03-16 NOTE — DISCH
FINAL DIAGNOSES:

1. Chronic cholecystitis and cholelithiasis.

2. Chronic obstructive pulmonary disease.

3. Personality disorder.

4. Panlobular emphysema.

5. Chronic low back pain.

6. Open wound of right calf.

7. Obesity.

 

OPERATIVE PROCEDURES:  Done on 03/09/2020, laparoscopic cholecystectomy.

 

SUMMARY:  This is a 64-year-old with quite severe COPD with a significant emphysema and

asthma component, who was suffering from recurrent biliary colic.  On the date of admission,

we were fortunate enough to get her gallbladder out by laparoscopic approach, as there was a

small area without significant adhesions in the area around the gallbladder.

Postoperatively, she was maintained in the ICU, due to her quite severe COPD and history of

requiring BiPAP and/or mechanical ventilation with most of her hospitalizations involving

any abdominal surgery.  This time, we were able to avoid either of those issues, and at this

point, she will be discharged home on her usual medications plus Norco 5/325 one tablet q.4

hours p.r.n. pain.  We will continue to treat her leg ulcer per the [facility]

recommendations and follow up with Dr. August in [facility] on 03/18/2020.

## 2020-03-16 NOTE — PN
DATE OF SERVICE:  03/12/2020

 

The patient has been afebrile with stable vital signs.  __________ from the pulmonary

problems fairly well at this point.  We will keep her one more day to consolidate and work

on pulmonary toilet.  She will likely be ready for discharge home tomorrow.

 

 

 

 

Arian August MD

DD:  03/12/2020 09:36:51

DT:  03/16/2020 12:56:06

Job #:  [id number]

## 2020-03-19 NOTE — OR
DATE OF PROCEDURE:  03/09/2020

 

SURGEON:  Arian August MD

 

PREOPERATIVE DIAGNOSIS:  Biliary dyskinesia.

 

POSTOPERATIVE DIAGNOSIS:  Biliary dyskinesia.

 

OPERATIVE PROCEDURE:  Laparoscopic cholecystectomy (26929).

 

ANESTHESIA:  General.

 

ASSISTANT:  Mariel Lugo PA-C

 

INDICATIONS FOR PROCEDURE:  The patient presents with ongoing right upper quadrant pain and

an abnormal CCK stimulated HIDA scan.  The patient is fairly high risk from a pulmonary

standpoint for surgery, but after discussion wished to proceed.  We will attempt this

laparoscopically and with significant need for open approach due to adhesions.  Potential

risks per se including bleeding, infection, injury to underlying viscera, possible

incomplete relief of symptoms following the procedure, as well as possibility of

cardiopulmonary, septic, or hemorrhagic complications leading to death were all discussed,

and the patient wishes to proceed.

 

DETAILS OF PROCEDURE:  The patient was taken to the operating room and placed in a supine

position.  After general endotracheal anesthesia was induced, the abdomen was prepped and

draped.  In the right lateral abdomen, a transverse incision was made and peritoneal cavity

entered under direct vision with an Optiview trocar.  This did enter into a space in the

right upper abdomen which was relatively clear of adhesions.  A 12 mm epigastric trocar site

was placed, along with a 5 mm right subcostal trocar.  Gallbladder was then identified.

This was noted to be edematous and quite distended consistent with some chronic

cholecystitis.  Gallbladder was retracted anterolaterally, and dissection with Harmonic

Scalpel continued downward along the gallbladder neck and into the gallbladder neck/cystic

duct junction.  Once that area was well delineated, along with the adjacent cystic artery,

both structures were clipped 3 times proximally, once distally, and divided.  The

gallbladder was then dissected off the gallbladder bed using Harmonic scalpel and delivered

through the epigastric trocar site.  It was noted to have some fine sludge within it and a

general cholesterolosis of the gallbladder wall.

 

The area of dissection was then inspected.  A Moises-Gibson drain was then taken out through

the right lateral trocar site and positioned adjacent to the gallbladder bed.  With no

further problems, the trocars were removed.  Fascia was closed at the 12 mm site with 0

Vicryl stitch.  Prior to closure, transversus abdominis plane blocks were placed.  Due to

the adhesions, both of the blocks were placed on the right side, as we were not able to

visualize anything on the left abdomen, and all of the trocars were essentially placed at

least slightly to the right of the midline.  The patient was taken to the recovery room in

satisfactory condition.

 

Physician assistant, Mariel Lugo, played an essential role in assisting in this case,

helping to position the patient, retract structures as needed, as well as suturing and

cutting sutures when indicated.  Her presence improved patient safety and decreased

operative time.

 

 

 

 

Arian August MD

DD:  03/17/2020 19:26:27

DT:  03/18/2020 14:20:43

Job #:  [id number]

## 2020-06-15 ENCOUNTER — HOSPITAL ENCOUNTER (INPATIENT)
Dept: HOSPITAL 11 - JP.SDS | Age: 65
LOS: 3 days | Discharge: HOME HEALTH | DRG: 470 | End: 2020-06-18
Attending: ORTHOPAEDIC SURGERY | Admitting: ORTHOPAEDIC SURGERY
Payer: MEDICARE

## 2020-06-15 DIAGNOSIS — J44.9: ICD-10-CM

## 2020-06-15 DIAGNOSIS — Z79.890: ICD-10-CM

## 2020-06-15 DIAGNOSIS — E03.9: ICD-10-CM

## 2020-06-15 DIAGNOSIS — F33.9: ICD-10-CM

## 2020-06-15 DIAGNOSIS — E66.9: ICD-10-CM

## 2020-06-15 DIAGNOSIS — G47.33: ICD-10-CM

## 2020-06-15 DIAGNOSIS — F41.9: ICD-10-CM

## 2020-06-15 DIAGNOSIS — K21.9: ICD-10-CM

## 2020-06-15 DIAGNOSIS — Z88.8: ICD-10-CM

## 2020-06-15 DIAGNOSIS — M17.11: Primary | ICD-10-CM

## 2020-06-15 DIAGNOSIS — Z79.899: ICD-10-CM

## 2020-06-15 DIAGNOSIS — E55.9: ICD-10-CM

## 2020-06-15 PROCEDURE — C1713 ANCHOR/SCREW BN/BN,TIS/BN: HCPCS

## 2020-06-15 PROCEDURE — 0SRC0L9 REPLACEMENT OF RIGHT KNEE JOINT WITH MEDIAL UNICONDYLAR SYNTHETIC SUBSTITUTE, CEMENTED, OPEN APPROACH: ICD-10-PCS | Performed by: ORTHOPAEDIC SURGERY

## 2020-06-15 PROCEDURE — C1776 JOINT DEVICE (IMPLANTABLE): HCPCS

## 2020-06-15 RX ADMIN — KETOROLAC TROMETHAMINE SCH MG: 30 INJECTION, SOLUTION INTRAMUSCULAR at 20:59

## 2020-06-15 RX ADMIN — HYDROCODONE BITARTRATE AND ACETAMINOPHEN PRN TAB: 5; 325 TABLET ORAL at 19:51

## 2020-06-15 RX ADMIN — BUPROPION HYDROCHLORIDE SCH MG: 150 TABLET, EXTENDED RELEASE ORAL at 20:58

## 2020-06-15 RX ADMIN — HYDROCODONE BITARTRATE AND ACETAMINOPHEN PRN TAB: 5; 325 TABLET ORAL at 15:20

## 2020-06-15 NOTE — CR
Knee 1V or 2V Rt

 

CLINICAL HISTORY: Hemiprosthesis

 

FINDINGS: Patient is status post placement of a medial hemiprosthesis in the

right knee. Components appear well seated.

 

Impression: Status post recent placement hemiprosthesis

## 2020-06-16 RX ADMIN — HYDROCODONE BITARTRATE AND ACETAMINOPHEN PRN TAB: 5; 325 TABLET ORAL at 06:16

## 2020-06-16 RX ADMIN — KETOROLAC TROMETHAMINE SCH MG: 30 INJECTION, SOLUTION INTRAMUSCULAR at 20:15

## 2020-06-16 RX ADMIN — ONDANSETRON PRN MG: 2 INJECTION, SOLUTION INTRAMUSCULAR; INTRAVENOUS at 20:10

## 2020-06-16 RX ADMIN — BUPROPION HYDROCHLORIDE SCH MG: 150 TABLET, EXTENDED RELEASE ORAL at 20:17

## 2020-06-16 RX ADMIN — KETOROLAC TROMETHAMINE SCH MG: 30 INJECTION, SOLUTION INTRAMUSCULAR at 10:07

## 2020-06-16 RX ADMIN — BUPROPION HYDROCHLORIDE SCH MG: 150 TABLET, EXTENDED RELEASE ORAL at 10:05

## 2020-06-16 RX ADMIN — HYDROCODONE BITARTRATE AND ACETAMINOPHEN PRN TAB: 5; 325 TABLET ORAL at 10:03

## 2020-06-16 RX ADMIN — KETOROLAC TROMETHAMINE SCH MG: 30 INJECTION, SOLUTION INTRAMUSCULAR at 03:17

## 2020-06-16 RX ADMIN — Medication SCH: at 11:17

## 2020-06-16 RX ADMIN — KETOROLAC TROMETHAMINE SCH MG: 30 INJECTION, SOLUTION INTRAMUSCULAR at 14:16

## 2020-06-16 NOTE — [id number]
- General Info


Date of Service: 06/16/20


Date of Surgery/Procedure: 06/15/20


POD#: 1


Functional Status: Reports: Tolerating Diet, Ambulating, Urinating





- Review of Systems


General: Reports: No Symptoms


HEENT: Reports: No Symptoms


Pulmonary: Reports: Shortness of Breath


Cardiovascular: Reports: No Symptoms


Gastrointestinal: Reports: No Symptoms


Genitourinary: Reports: No Symptoms


Musculoskeletal: Reports: Leg Pain


Skin: Reports: No Symptoms


Neurological: Reports: No Symptoms


Psychiatric: Reports: No Symptoms





- Patient Data


Vitals - Most Recent: 


 Last Vital Signs











Temp  35.6 C L  06/16/20 14:25


 


Pulse  57 L  06/16/20 14:25


 


Resp  18   06/16/20 14:25


 


BP  83/32 L  06/16/20 14:25


 


Pulse Ox  95   06/16/20 14:25











Weight - Most Recent: 80.014 kg


I&O - Last 24 Hours: 


 Intake & Output











 06/16/20 06/16/20 06/16/20





 06:59 14:59 22:59


 


Intake Total 2221 1447 420


 


Output Total  800 400


 


Balance 2221 647 20











Med Orders - Current: 


 Current Medications





Acetaminophen (Tylenol Extra Strength)  1,000 mg PO BID PRN


   PRN Reason: Pain


   Last Admin: 06/15/20 13:10 Dose:  1,000 mg


Hydrocodone Bitart/Acetaminophen (Norco 325-5 Mg)  2 tab PO Q3H PRN


   PRN Reason: Pain (mild 1-3)


   Last Admin: 06/16/20 10:03 Dose:  2 tab


Albuterol (Proventil Neb Soln)  2.5 mg NEB Q4H PRN


   PRN Reason: Shortness of Breath


Albuterol (Ventolin Hfa)  0 gm INH Q6H PRN


   PRN Reason: Shortness of Breath


Bupropion HCl (Wellbutrin Sr)  150 mg PO BID [facility]


   Last Admin: 06/16/20 10:05 Dose:  150 mg


Cetirizine HCl (Zyrtec)  10 mg PO DAILY [facility]


   Last Admin: 06/16/20 10:07 Dose:  10 mg


Clonazepam (Klonopin)  0.5 mg PO DAILY PRN


   PRN Reason: Anxiety


   Last Admin: 06/15/20 20:56 Dose:  0.5 mg


Docusate Sodium (Colace)  100 mg PO BID [facility]


   Last Admin: 06/16/20 10:06 Dose:  100 mg


Fluticasone Propionate (Flonase)  0 gm NASBOTH DAILY PRN


   PRN Reason: Allergies


Furosemide (Lasix)  40 mg PO DAILY PRN


   PRN Reason: swelling


Gabapentin (Neurontin)  300 mg PO BEDTIME [facility]


   Last Admin: 06/15/20 20:58 Dose:  300 mg


Hydromorphone HCl (Dilaudid)  2 - 4 mg PO Q4H PRN


   PRN Reason: Pain (moderate 4-6)


   Last Admin: 06/15/20 23:20 Dose:  4 mg


Sodium Chloride (Normal Saline)  1,000 mls @ 125 mls/hr IV ASDIRECTED [facility]


   Last Admin: 06/16/20 12:34 Dose:  125 mls/hr


Ketorolac Tromethamine (Toradol)  30 mg IVPUSH Q6H [facility]


   Stop: 06/17/20 03:01


   Last Admin: 06/16/20 14:16 Dose:  30 mg


Lamotrigine (Lamotrigine)  25 mg PO DAILY [facility]


   Last Admin: 06/16/20 10:05 Dose:  25 mg


Levothyroxine Sodium (Levothyroxine)  25 mcg PO ACBREAKFAST [facility]


   Last Admin: 06/16/20 07:23 Dose:  25 mcg


Meclizine HCl (Antivert)  25 mg PO DAILY [facility]


   Last Admin: 06/16/20 10:06 Dose:  25 mg


Morphine Sulfate (Morphine)  2 mg IVPUSH Q1H PRN


   PRN Reason: Breakthrough Pain


   Last Admin: 06/15/20 19:52 Dose:  2 mg


Ondansetron HCl (Zofran)  4 mg IVPUSH Q6H PRN


   PRN Reason: Nausea/Vomiting


Pantoprazole Sodium (Protonix***)  40 mg PO BIDAC [facility]


   Last Admin: 06/16/20 16:16 Dose:  40 mg


Anoro Ellipta 62.5/ (25mcg Inhaler (Ptom))  1 each INH DAILY [facility]


   Last Admin: 06/16/20 11:17 Dose:  Not Given


Sertraline HCl (Zoloft)  150 mg PO DAILY [facility]


   Last Admin: 06/16/20 10:04 Dose:  150 mg


Tizanidine HCl (Zanaflex)  4 mg PO Q8H PRN


   PRN Reason: Spasms


   Last Admin: 06/15/20 20:57 Dose:  4 mg


Tramadol HCl (Ultram)  50 mg PO Q6H [facility]


   Last Admin: 06/16/20 16:16 Dose:  50 mg


Trazodone HCl (Trazodone)  50 mg PO BEDTIME PRN


   PRN Reason: Sleep


   Last Admin: 06/15/20 20:56 Dose:  50 mg


Varenicline (Chantix)  1 mg PO BID [facility]


   Last Admin: 06/16/20 10:04 Dose:  1 mg





Discontinued Medications





Bandage/Support Products ( Nasal )  1 applic NASBOTH ONETIME ONE


   Stop: 06/15/20 07:01


   Last Admin: 06/15/20 07:11 Dose:  1 applic


Bupivacaine HCl (Sensorcaine-Mpf 0.25%) Confirm Administered Dose 10 ml .ROUTE 

.STK-MED ONE


   Stop: 06/15/20 06:43


   Last Admin: 06/15/20 09:00 Dose:  10 ml


Bupivacaine HCl (Sensorcaine-Mpf 0.25%) Confirm Administered Dose 10 ml .ROUTE 

.STK-MED ONE


   Stop: 06/15/20 09:01


   Last Admin: 06/15/20 09:00 Dose:  10 ml


Bupivacaine HCl (Sensorcaine-Mpf 0.25%) Confirm Administered Dose 10 ml .ROUTE 

.STK-MED ONE


   Stop: 06/15/20 09:01


Dexamethasone (Dexamethasone) Confirm Administered Dose 4 mg .ROUTE .STK-MED ONE


   Stop: 06/15/20 07:23


Fentanyl (Sublimaze) Confirm Administered Dose 250 mcg .ROUTE .STK-MED ONE


   Stop: 06/15/20 07:22


Glycopyrrolate (Robinul) Confirm Administered Dose 1 mg .ROUTE .STK-MED ONE


   Stop: 06/15/20 07:23


Hydroxyzine HCl (Vistaril)  75 mg IM ONETIME ONE


   Stop: 06/15/20 09:46


   Last Admin: 06/15/20 09:42 Dose:  75 mg


Cefazolin Sodium/Dextrose 2 gm (/ Premix)  50 mls @ 100 mls/hr IV ONETIME ONE


   Stop: 06/15/20 07:59


   Last Admin: 06/15/20 07:35 Dose:  100 mls/hr


Lactated Ringer's (Ringers, Lactated)  1,000 mls @ 75 mls/hr IV ASDIRECTED [facility]


   Last Admin: 06/15/20 07:11 Dose:  75 mls/hr


Cefazolin Sodium/Dextrose 1 gm (/ Premix)  50 mls @ 100 mls/hr IV Q8H [facility]


   Stop: 06/16/20 06:59


   Last Admin: 06/16/20 06:09 Dose:  100 mls/hr


Lidocaine (Lta 360 Kit Top Soln) Confirm Administered Dose 4 ml .ROUTE .STK-MED 

ONE


   Stop: 06/15/20 07:44


Midazolam HCl (Versed 1 Mg/Ml)  2 mg IVPUSH ONETIME ONE


   Stop: 06/15/20 10:04


   Last Admin: 06/15/20 10:08 Dose:  2 mg


Morphine Sulfate (Morphine)  3 mg IM ONETIME ONE


   Stop: 06/15/20 09:46


   Last Admin: 06/15/20 09:45 Dose:  3 mg


Neostigmine Methylsulfate (Neostigmine) Confirm Administered Dose 5 mg .ROUTE 

.STK-MED ONE


   Stop: 06/15/20 07:23


Ondansetron HCl (Zofran) Confirm Administered Dose 4 mg .ROUTE .STK-MED ONE


   Stop: 06/15/20 07:23


Povidone Iodine (Betadine 10% Soln) Confirm Administered Dose 1 ml .ROUTE .STK-

MED ONE


   Stop: 06/15/20 06:43


   Last Admin: 06/15/20 08:21 Dose:  1 ml


Propofol (Diprivan  20 Ml) Confirm Administered Dose 200 mg .ROUTE .STK-MED ONE


   Stop: 06/15/20 07:23


Rocuronium Bromide (Zemuron) Confirm Administered Dose 50 mg .ROUTE .STK-MED ONE


   Stop: 06/15/20 07:23











- Exam


Wound/Incisions: Dressing Dry and Intact, No Drainage


Quality Assessment: Supplemental Oxygen


General: Alert, Oriented


HEENT: Pupils Equal, Pupils Reactive, EOMI, Mucous Membr. Moist/Pink


Lungs: Clear to Auscultation, Other (decreased O2 sats on room air)


Cardiovascular: Regular Rate, Regular Rhythm


GI/Abdominal Exam: Normal Bowel Sounds, Soft, Non-Tender, No Distention


Extremities: Leg Pain, Limited Range of Motion


Skin: Warm, Dry


Neurological: No New Focal Deficit


Psy/Mental Status: Alert, Normal Affect, Normal Mood





Sepsis Event Note





- Evaluation


Sepsis Screening Result: No Definite Risk





- Focused Exam


Vital Signs: 


 Vital Signs











  Temp Pulse Resp BP Pulse Ox


 


 06/16/20 14:25  35.6 C L  57 L  18  83/32 L  95


 


 06/16/20 11:25  35.7 C L  91  18  77/47 L  91 L


 


 06/16/20 07:24  35.6 C L  72   85/43 L  93 L











Date Exam was Performed: 06/16/20


Time Exam was Performed: 17:35





- Problem List & Annotations


(1) Status post right partial knee replacement


SNOMED Code(s): 219988488, 41177386, 503415294, 383130915


   Code(s): Z96.651 - PRESENCE OF RIGHT ARTIFICIAL KNEE JOINT   Status: Acute   

Current Visit: Yes   





(2) Knee pain, right


SNOMED Code(s): 82965185


   Code(s): M25.561 - PAIN IN RIGHT KNEE   Status: Acute   Current Visit: No   


Qualifiers: 


   Chronicity: chronic 





(3) Osteoarthritis, knee


SNOMED Code(s): 071035953


   Code(s): M17.10 - UNILATERAL PRIMARY OSTEOARTHRITIS, UNSPECIFIED KNEE   

Status: Acute   Current Visit: No   


Qualifiers: 


   Osteoarthritis type: primary   Laterality: right   Qualified Code(s): M17.11 

- Unilateral primary osteoarthritis, right knee   





- Problem List Review


Problem List Initiated/Reviewed/Updated: Yes





- My Orders


Last 24 Hours: 


 Active Orders 24 hr











 Category Date Time Status


 


 Cetirizine [ZyrTEC] Med  06/16/20 09:00 Active





 10 mg PO DAILY   


 


 Docusate Sodium [Colace] Med  06/15/20 21:00 Active





 100 mg PO BID   


 


 Gabapentin [Neurontin] Med  06/15/20 21:00 Active





 300 mg PO BEDTIME   


 


 Ketorolac [Toradol] Med  06/15/20 21:00 Active





 30 mg IVPUSH Q6H   


 


 Patient's Own Medication [Ptom] Med  06/16/20 09:00 Active





 1 each INH DAILY   


 


 Sertraline [Zoloft] Med  06/16/20 09:00 Active





 150 mg PO DAILY   


 


 Varenicline [Chantix] Med  06/15/20 21:00 Active





 1 mg PO BID   


 


 buPROPion [Wellbutrin SR] Med  06/15/20 21:00 Active





 150 mg PO BID   


 


 tiZANidine [Zanaflex] Med  06/15/20 20:44 Active





 4 mg PO Q8H PRN   


 


 Convert IV to Saline Lock [OM.PC] Routine Oth  06/16/20 10:04 Ordered








 Medication Orders





Acetaminophen (Tylenol Extra Strength)  1,000 mg PO BID PRN


   PRN Reason: Pain


   Last Admin: 06/15/20 13:10  Dose: 1,000 mg


Hydrocodone Bitart/Acetaminophen (Norco 325-5 Mg)  2 tab PO Q3H PRN


   PRN Reason: Pain (mild 1-3)


   Last Admin: 06/16/20 10:03  Dose: 2 tab


   Admin: 06/16/20 06:16  Dose: 2 tab


   Admin: 06/15/20 19:51  Dose: 2 tab


   Admin: 06/15/20 15:20  Dose: 2 tab


Albuterol (Proventil Neb Soln)  2.5 mg NEB Q4H PRN


   PRN Reason: Shortness of Breath


Albuterol (Ventolin Hfa)  0 gm INH Q6H PRN


   PRN Reason: Shortness of Breath


Bupropion HCl (Wellbutrin Sr)  150 mg PO BID [facility]


   Last Admin: 06/16/20 10:05  Dose: 150 mg


   Admin: 06/15/20 20:58  Dose: 150 mg


Cetirizine HCl (Zyrtec)  10 mg PO DAILY [facility]


   Last Admin: 06/16/20 10:07  Dose: 10 mg


Clonazepam (Klonopin)  0.5 mg PO DAILY PRN


   PRN Reason: Anxiety


   Last Admin: 06/15/20 20:56  Dose: 0.5 mg


Docusate Sodium (Colace)  100 mg PO BID [facility]


   Last Admin: 06/16/20 10:06  Dose: 100 mg


   Admin: 06/15/20 20:59  Dose: 100 mg


Fluticasone Propionate (Flonase)  0 gm NASBOTH DAILY PRN


   PRN Reason: Allergies


Furosemide (Lasix)  40 mg PO DAILY PRN


   PRN Reason: swelling


Gabapentin (Neurontin)  300 mg PO BEDTIME [facility]


   Last Admin: 06/15/20 20:58  Dose: 300 mg


Hydromorphone HCl (Dilaudid)  2 - 4 mg PO Q4H PRN


   PRN Reason: Pain (moderate 4-6)


   Last Admin: 06/15/20 23:20  Dose: 4 mg


   Admin: 06/15/20 18:36  Dose: 4 mg


   Admin: 06/15/20 11:01  Dose: 4 mg


Sodium Chloride (Normal Saline)  1,000 mls @ 125 mls/hr IV ASDIRECTED [facility]


   Last Admin: 06/16/20 12:34  Dose: 125 mls/hr


   Infusion: 06/16/20 11:16  Dose: 125 mls/hr


   Admin: 06/16/20 03:16  Dose: 125 mls/hr


   Infusion: 06/16/20 02:36  Dose: 125 mls/hr


   Admin: 06/15/20 18:36  Dose: 125 mls/hr


   Infusion: 06/15/20 18:16  Dose: 125 mls/hr


   Admin: 06/15/20 10:16  Dose: 125 mls/hr


Ketorolac Tromethamine (Toradol)  30 mg IVPUSH Q6H [facility]


   Stop: 06/17/20 03:01


   Last Admin: 06/16/20 14:16  Dose: 30 mg


   Admin: 06/16/20 10:07  Dose: 30 mg


   Admin: 06/16/20 03:17  Dose: 30 mg


   Admin: 06/15/20 20:59  Dose: 30 mg


Lamotrigine (Lamotrigine)  25 mg PO DAILY [facility]


   Last Admin: 06/16/20 10:05  Dose: 25 mg


   Admin: 06/15/20 14:00  Dose: 25 mg


Levothyroxine Sodium (Levothyroxine)  25 mcg PO ACBREAKFAST [facility]


   Last Admin: 06/16/20 07:23  Dose: 25 mcg


   Admin: 06/15/20 13:59  Dose: 25 mcg


Meclizine HCl (Antivert)  25 mg PO DAILY [facility]


   Last Admin: 06/16/20 10:06  Dose: 25 mg


   Admin: 06/15/20 14:00  Dose: 25 mg


Morphine Sulfate (Morphine)  2 mg IVPUSH Q1H PRN


   PRN Reason: Breakthrough Pain


   Last Admin: 06/15/20 19:52  Dose: 2 mg


   Admin: 06/15/20 16:47  Dose: 2 mg


   Admin: 06/15/20 12:33  Dose: 2 mg


Ondansetron HCl (Zofran)  4 mg IVPUSH Q6H PRN


   PRN Reason: Nausea/Vomiting


Pantoprazole Sodium (Protonix***)  40 mg PO BIDAC [facility]


   Last Admin: 06/16/20 16:16  Dose: 40 mg


   Admin: 06/16/20 07:23  Dose: 40 mg


   Admin: 06/15/20 16:03  Dose: 40 mg


Anoro Ellipta 62.5/ (25mcg Inhaler (Ptom))  1 each INH DAILY [facility]


   Last Admin: 06/16/20 11:17  Dose:  


Sertraline HCl (Zoloft)  150 mg PO DAILY [facility]


   Last Admin: 06/16/20 10:04  Dose: 150 mg


Tizanidine HCl (Zanaflex)  4 mg PO Q8H PRN


   PRN Reason: Spasms


   Last Admin: 06/15/20 20:57  Dose: 4 mg


Tramadol HCl (Ultram)  50 mg PO Q6H [facility]


   Last Admin: 06/16/20 16:16  Dose: 50 mg


   Admin: 06/16/20 11:16  Dose: 50 mg


   Admin: 06/16/20 04:00  Dose: 50 mg


   Admin: 06/15/20 22:26  Dose: 50 mg


   Admin: 06/15/20 15:17  Dose: 50 mg


Trazodone HCl (Trazodone)  50 mg PO BEDTIME PRN


   PRN Reason: Sleep


   Last Admin: 06/15/20 20:56  Dose: 50 mg


Varenicline (Chantix)  1 mg PO BID [facility]


   Last Admin: 06/16/20 10:04  Dose: 1 mg


   Admin: 06/15/20 20:57  Dose: 1 mg











- Assessment


Assessment           (Free Text/Narrative):: 





Pain better controlled today, has been out of bed but only walked about 6o feet

, not independent for home, O2 sats dropped on room air





- Plan


Plan                        (Free Text/Narrative):: 





Continue PT, anticipate improvement tomorrow and home late morning or afternoon

, try to decrease pain meds and wean off O2, Home PT

## 2020-06-17 RX ADMIN — BUPROPION HYDROCHLORIDE SCH MG: 150 TABLET, EXTENDED RELEASE ORAL at 20:19

## 2020-06-17 RX ADMIN — HYDROCODONE BITARTRATE AND ACETAMINOPHEN PRN TAB: 5; 325 TABLET ORAL at 20:11

## 2020-06-17 RX ADMIN — ONDANSETRON PRN MG: 2 INJECTION, SOLUTION INTRAMUSCULAR; INTRAVENOUS at 11:08

## 2020-06-17 RX ADMIN — BUPROPION HYDROCHLORIDE SCH MG: 150 TABLET, EXTENDED RELEASE ORAL at 08:15

## 2020-06-17 RX ADMIN — ASPIRIN SCH MG: 325 TABLET, DELAYED RELEASE ORAL at 20:19

## 2020-06-17 RX ADMIN — HYDROCODONE BITARTRATE AND ACETAMINOPHEN PRN TAB: 5; 325 TABLET ORAL at 16:09

## 2020-06-17 RX ADMIN — Medication SCH: at 08:16

## 2020-06-17 RX ADMIN — KETOROLAC TROMETHAMINE SCH MG: 30 INJECTION, SOLUTION INTRAMUSCULAR at 04:00

## 2020-06-18 VITALS — HEART RATE: 66 BPM | SYSTOLIC BLOOD PRESSURE: 96 MMHG | DIASTOLIC BLOOD PRESSURE: 52 MMHG

## 2020-06-18 RX ADMIN — ASPIRIN SCH MG: 325 TABLET, DELAYED RELEASE ORAL at 09:40

## 2020-06-18 RX ADMIN — Medication SCH: at 10:35

## 2020-06-18 RX ADMIN — HYDROCODONE BITARTRATE AND ACETAMINOPHEN PRN TAB: 5; 325 TABLET ORAL at 07:15

## 2020-06-18 RX ADMIN — HYDROCODONE BITARTRATE AND ACETAMINOPHEN PRN TAB: 5; 325 TABLET ORAL at 00:12

## 2020-06-18 RX ADMIN — BUPROPION HYDROCHLORIDE SCH MG: 150 TABLET, EXTENDED RELEASE ORAL at 09:40

## 2020-06-18 NOTE — [id number]
**Discharge Summary





- Hospital Course


HPI Initial Comments: 





64 year old female admitted for right unicompartmental knee replacement


Diagnosis: Stroke: No


Modified [place] Scale: No Symptoms at All


Modified [place] Scale Score: 0





- Discharge Data


Discharge Date: 06/18/20


Discharge Disposition: Home, W [facility]


Condition: Good





- Referral to Home Health


Date of Face to Face Encounter: 06/18/20


Reason for Homebound Status: Right knee surgery, unable to drive


Primary Care Physician: 


PCP None





Skilled Need: Physical Therapy





- Discharge Diagnosis/Problem(s)


(1) Status post right partial knee replacement


SNOMED Code(s): 657449635, 23971311, 758223826, 376891855


   ICD Code: Z96.651 - PRESENCE OF RIGHT ARTIFICIAL KNEE JOINT   Status: Acute  

Current Visit: Yes   





(2) Knee pain, right


SNOMED Code(s): 66856746


   ICD Code: M25.561 - PAIN IN RIGHT KNEE   Status: Acute   Current Visit: No   


Qualifiers: 


   Chronicity: chronic 





(3) Osteoarthritis, knee


SNOMED Code(s): 433050473


   ICD Code: M17.10 - UNILATERAL PRIMARY OSTEOARTHRITIS, UNSPECIFIED KNEE   

Status: Acute   Current Visit: No   


Qualifiers: 


   Osteoarthritis type: primary   Laterality: right   Qualified Code(s): M17.11 

- Unilateral primary osteoarthritis, right knee   





- Patient Summary/Data


Operative Procedure(s) Performed: right medial unicompartmental knee


Consults: 


                                  Consultations





06/15/20 09:11


Consult to Case Management/ [CONS] Routine 


   Comment: 


   Physician Instructions: 


   Service(s) to be Consulted: Case Management


   Reason for Consult: Plan for Discharge


OT Evaluation and Treatment [CONS] Routine 


   Please Evaluate and Treat.


   OT Reason for Consult: ADL's


   This query below is only for informational purposes and is not editable.


PT Evaluation and Treatment [CONS] Routine 


   Please Evaluate and Treat.


   PT Reason for Consult: Post op Ortho Surgery


   This query below is only for informational purposes and is not editable.


PT Evaluation and Treatment [CONS] Routine 


   Please Evaluate and Treat.


   PT Reason for Consult: Post op Ortho Surgery Knee


   Pending Discharge: Yes, 1- 2 days


   Special Instructions: Schedule first outpatient PT appointment in 3-5 day 

post discharge.


   This query below is only for informational purposes and is not editable.











Hospital Course: 





Tolerated procedure without complications. Difficulty with pain control 

initially. Stable post op but some decreased O2 sats, does use O2 at home 

intermittently.  Progressed slowly with PT.  Did much better by POD#3 and was 

independent with walker.  Dressing changed POD #2, no wound complications.  

Arrangements made for Home Health and PT.  Follow up 2 weeks.





- Patient Instructions


Diet: Usual Diet as Tolerated


Activity: Apply Ice, As Tolerated, Full Weight Bearing


Driving: Do Not Drive


Showering/Bathing: May Shower


Wound/Incision Care: Keep Operative Site/Wound Site Clean and Dry


Notify Provider of: Fever, Increased Pain, Swelling and Redness, Drainage


Other/Special Instructions: Aspirin 325mg by mouth twice per day





- Discharge Plan


*PRESCRIPTION DRUG MONITORING PROGRAM REVIEWED*: No


*COPY OF PRESCRIPTION DRUG MONITORING REPORT IN PATIENT KENISHA: No


Prescriptions/Med Rec: 


HYDROmorphone [Dilaudid] 2 - 4 mg PO Q6H PRN #48 tab


 PRN Reason: Pain


Home Medications: 


                                    Home Meds





Levothyroxine Sodium [Synthroid] 25 mcg PO .ACBREAKFAST 01/29/15 [History]


ondansetron HCL [Zofran] 8 mg PO Q8H PRN 05/21/15 [History]


Furosemide 40 mg PO DAILY PRN 01/11/17 [History]


Sertraline HCl [Zoloft] 150 mg PO DAILY 09/20/17 [History]


traZODone 50 - 100 mg PO BEDTIME PRN 09/20/17 [History]


ClonazePAM [KlonoPIN] 0.5 mg PO DAILY PRN 09/29/17 [History]


Penciclovir [Denavir 1% Crm] 1 applic TOP ASDIRECTED 09/29/17 [History]


Mometasone Furoate [Elocon 0.1% Crm] 1 applic TOP DAILY 04/12/18 [History]


tiZANidine [Zanaflex] 4 mg PO Q8H PRN 04/12/18 [History]


Pantoprazole [ProTONIX***] 40 mg PO BID 12/31/19 [History]


buPROPion HCL [Wellbutrin SR] 150 mg PO BID 12/31/19 [History]


Acetaminophen [Tylenol Extra Strength] 1,000 mg PO BID PRN 03/05/20 [History]


Albuterol [Proventil Neb Soln] 3 ml NEB Q4H PRN 03/05/20 [History]


Cetirizine [ZyrTEC] 10 mg PO DAILY 03/05/20 [History]


Cholecalciferol (Vitamin D3) [Vitamin D3] 2,000 unit PO DAILY 03/05/20 [History]


Cyanocobalamin (Vitamin B-12) [B-12] 500 mcg PO DAILY 03/05/20 [History]


Gabapentin [Neurontin] 300 mg PO BEDTIME 03/05/20 [History]


Lidocaine 2% [Xylocaine 2% Jelly] 1 applic TOP DAILY 03/05/20 [History]


Meclizine [Antivert] 25 mg PO .MORNING 03/05/20 [History]


Varenicline [Chantix] 1 mg PO BID 03/05/20 [History]


lamoTRIgine [Lamotrigine] 25 mg PO .[id number] 03/24/20 [History]


Albuterol [Ventolin HFA] 2 puff INH Q6H PRN 06/11/20 [History]


Fluticasone Propionate [Flonase] 2 spr NASBOTH DAILY PRN 06/11/20 [History]


Homeopathic Products (Leg Cramp Complex Or) 1 - 2 tab PO BEDTIME 06/11/20 

[History]


Umeclidinium Brm/Vilanterol Tr [Anoro Ellipta 62.5-25 MCG] 1 each INH DAILY 

06/11/20 [History]


HYDROmorphone [Dilaudid] 2 - 4 mg PO Q6H PRN #48 tab 06/18/20 [Rx]








Oxygen Therapy Mode: Nasal Cannula


Referrals: 


Chandana Oro MD [Physician] - 06/30/20 1:15 pm





- Discharge Summary/Plan Comment


DC Time >30 min.: No





- General Info


Functional Status: Reports: Pain Controlled, Tolerating Diet, Ambulating, 

Urinating





- Review of Systems


General: Reports: No Symptoms


HEENT: Reports: No Symptoms


Pulmonary: Reports: Shortness of Breath


Cardiovascular: Reports: No Symptoms


Gastrointestinal: Reports: No Symptoms


Genitourinary: Reports: No Symptoms


Musculoskeletal: Reports: Leg Pain, Joint Swelling


Skin: Reports: No Symptoms


Neurological: Reports: No Symptoms


Psychiatric: Reports: No Symptoms





- Patient Data


Vitals - Most Recent: 


                                Last Vital Signs











Temp  35.2 C L  06/18/20 10:33


 


Pulse  66   06/18/20 10:33


 


Resp  16   06/18/20 10:33


 


BP  96/52 L  06/18/20 10:33


 


Pulse Ox  95   06/18/20 10:33











Weight - Most Recent: 80.014 kg


I&O - Last 24 hours: 


                                 Intake & Output











 06/17/20 06/18/20 06/18/20





 22:59 06:59 14:59


 


Intake Total 1480 710 600


 


Output Total 1000 900 


 


Balance 480 -190 600











Med Orders - Current: 


                               Current Medications





Acetaminophen (Tylenol Extra Strength)  1,000 mg PO BID PRN


   PRN Reason: Pain


   Last Admin: 06/15/20 13:10 Dose:  1,000 mg


   Documented by: 


Hydrocodone Bitart/Acetaminophen (Norco 325-5 Mg)  2 tab PO Q3H PRN


   PRN Reason: Pain (mild 1-3)


   Last Admin: 06/18/20 07:15 Dose:  2 tab


   Documented by: 


Albuterol (Proventil Neb Soln)  2.5 mg NEB Q4H PRN


   PRN Reason: Shortness of Breath


Albuterol (Ventolin Hfa)  0 gm INH Q6H PRN


   PRN Reason: Shortness of Breath


Aspirin (Ecotrin)  325 mg PO BID [facility]


   Last Admin: 06/18/20 09:40 Dose:  325 mg


   Documented by: 


Bupropion HCl (Wellbutrin Sr)  150 mg PO BID [facility]


   Last Admin: 06/18/20 09:40 Dose:  150 mg


   Documented by: 


Cetirizine HCl (Zyrtec)  10 mg PO DAILY [facility]


   Last Admin: 06/18/20 09:40 Dose:  10 mg


   Documented by: 


Clonazepam (Klonopin)  0.5 mg PO DAILY PRN


   PRN Reason: Anxiety


   Last Admin: 06/16/20 23:00 Dose:  0.5 mg


   Documented by: 


Docusate Sodium (Colace)  100 mg PO BID [facility]


   Last Admin: 06/18/20 09:42 Dose:  100 mg


   Documented by: 


Fluticasone Propionate (Flonase)  0 gm NASBOTH DAILY PRN


   PRN Reason: Allergies


Furosemide (Lasix)  40 mg PO DAILY PRN


   PRN Reason: swelling


Gabapentin (Neurontin)  300 mg PO BEDTIME [facility]


   Last Admin: 06/17/20 20:19 Dose:  300 mg


   Documented by: 


Hydromorphone HCl (Dilaudid)  2 - 4 mg PO Q4H PRN


   PRN Reason: Pain (moderate 4-6)


   Last Admin: 06/16/20 23:03 Dose:  4 mg


   Documented by: 


Sodium Chloride (Normal Saline)  1,000 mls @ 125 mls/hr IV ASDIRECTED [facility]


   Last Admin: 06/16/20 12:34 Dose:  125 mls/hr


   Documented by: 


Lamotrigine (Lamotrigine)  25 mg PO DAILY [facility]


   Last Admin: 06/18/20 09:41 Dose:  25 mg


   Documented by: 


Levothyroxine Sodium (Levothyroxine)  25 mcg PO ACBREAKFAST [facility]


   Last Admin: 06/18/20 07:11 Dose:  25 mcg


   Documented by: 


Meclizine HCl (Antivert)  25 mg PO DAILY [facility]


   Last Admin: 06/18/20 09:41 Dose:  25 mg


   Documented by: 


Morphine Sulfate (Morphine)  2 mg IVPUSH Q1H PRN


   PRN Reason: Breakthrough Pain


   Last Admin: 06/15/20 19:52 Dose:  2 mg


   Documented by: 


Ondansetron HCl (Zofran)  4 mg IVPUSH Q6H PRN


   PRN Reason: Nausea/Vomiting


   Last Admin: 06/17/20 11:08 Dose:  4 mg


   Documented by: 


Pantoprazole Sodium (Protonix***)  40 mg PO BIDAC [facility]


   Last Admin: 06/18/20 07:11 Dose:  40 mg


   Documented by: 


Anoro Ellipta 62.5/ (25mcg Inhaler (Ptom))  1 each INH DAILY [facility]


   Last Admin: 06/18/20 10:35 Dose:  Not Given


   Documented by: 


Sertraline HCl (Zoloft)  150 mg PO DAILY [facility]


   Last Admin: 06/18/20 09:40 Dose:  150 mg


   Documented by: 


Tizanidine HCl (Zanaflex)  4 mg PO Q8H PRN


   PRN Reason: Spasms


   Last Admin: 06/16/20 23:00 Dose:  4 mg


   Documented by: 


Tramadol HCl (Ultram)  50 mg PO Q6H [facility]


   Last Admin: 06/18/20 12:29 Dose:  50 mg


   Documented by: 


Trazodone HCl (Trazodone)  50 mg PO BEDTIME PRN


   PRN Reason: Sleep


   Last Admin: 06/16/20 23:00 Dose:  50 mg


   Documented by: 


Varenicline (Chantix)  1 mg PO BID [facility]


   Last Admin: 06/18/20 09:41 Dose:  1 mg


   Documented by: 





Discontinued Medications





Bandage/Support Products ( Nasal )  1 applic NASBOTH ONETIME ONE


   Stop: 06/15/20 07:01


   Last Admin: 06/15/20 07:11 Dose:  1 applic


   Documented by: 


Bupivacaine HCl (Sensorcaine-Mpf 0.25%) Confirm Administered Dose 10 ml .ROUTE 

.STK-MED ONE


   Stop: 06/15/20 06:43


   Last Admin: 06/15/20 09:00 Dose:  10 ml


   Documented by: 


Bupivacaine HCl (Sensorcaine-Mpf 0.25%) Confirm Administered Dose 10 ml .ROUTE 

.STK-MED ONE


   Stop: 06/15/20 09:01


   Last Admin: 06/15/20 09:00 Dose:  10 ml


   Documented by: 


Bupivacaine HCl (Sensorcaine-Mpf 0.25%) Confirm Administered Dose 10 ml .ROUTE 

.STK-MED ONE


   Stop: 06/15/20 09:01


Dexamethasone (Dexamethasone) Confirm Administered Dose 4 mg .ROUTE .STK-MED ONE


   Stop: 06/15/20 07:23


Fentanyl (Sublimaze) Confirm Administered Dose 250 mcg .ROUTE .STK-MED ONE


   Stop: 06/15/20 07:22


Glycopyrrolate (Robinul) Confirm Administered Dose 1 mg .ROUTE .STK-MED ONE


   Stop: 06/15/20 07:23


Hydroxyzine HCl (Vistaril)  75 mg IM ONETIME ONE


   Stop: 06/15/20 09:46


   Last Admin: 06/15/20 09:42 Dose:  75 mg


   Documented by: 


Cefazolin Sodium/Dextrose 2 gm (/ Premix)  50 mls @ 100 mls/hr IV ONETIME ONE


   Stop: 06/15/20 07:59


   Last Admin: 06/15/20 07:35 Dose:  100 mls/hr


   Documented by: 


Lactated Ringer's (Ringers, Lactated)  1,000 mls @ 75 mls/hr IV ASDIRECTED [facility]


   Last Admin: 06/15/20 07:11 Dose:  75 mls/hr


   Documented by: 


Cefazolin Sodium/Dextrose 1 gm (/ Premix)  50 mls @ 100 mls/hr IV Q8H [facility]


   Stop: 06/16/20 06:59


   Last Admin: 06/16/20 06:09 Dose:  100 mls/hr


   Documented by: 


Ketorolac Tromethamine (Toradol)  30 mg IVPUSH Q6H [facility]


   Stop: 06/17/20 03:01


   Last Admin: 06/17/20 04:00 Dose:  30 mg


   Documented by: 


Lidocaine (Lta 360 Kit Top Soln) Confirm Administered Dose 4 ml .ROUTE .STK-MED 

ONE


   Stop: 06/15/20 07:44


Midazolam HCl (Versed 1 Mg/Ml)  2 mg IVPUSH ONETIME ONE


   Stop: 06/15/20 10:04


   Last Admin: 06/15/20 10:08 Dose:  2 mg


   Documented by: 


Morphine Sulfate (Morphine)  3 mg IM ONETIME ONE


   Stop: 06/15/20 09:46


   Last Admin: 06/15/20 09:45 Dose:  3 mg


   Documented by: 


Neostigmine Methylsulfate (Neostigmine) Confirm Administered Dose 5 mg .ROUTE 

.STK-MED ONE


   Stop: 06/15/20 07:23


Ondansetron HCl (Zofran) Confirm Administered Dose 4 mg .ROUTE .STK-MED ONE


   Stop: 06/15/20 07:23


Povidone Iodine (Betadine 10% Soln) Confirm Administered Dose 1 ml .ROUTE .STK-

MED ONE


   Stop: 06/15/20 06:43


   Last Admin: 06/15/20 08:21 Dose:  1 ml


   Documented by: 


Propofol (Diprivan  20 Ml) Confirm Administered Dose 200 mg .ROUTE .STK-MED ONE


   Stop: 06/15/20 07:23


Rocuronium Bromide (Zemuron) Confirm Administered Dose 50 mg .ROUTE .STK-MED ONE


   Stop: 06/15/20 07:23


Tramadol HCl (Ultram)  50 mg PO Q6H [facility]


   Last Admin: 06/17/20 12:30 Dose:  Not Given


   Documented by: 











- Exam


General: Reports: Alert, Oriented


HEENT: Reports: Pupils Equal, Pupils Reactive, EOMI, Mucous Membr. Moist/Pink


Neck: Reports: Supple


Lungs: Reports: Clear to Auscultation, Normal Respiratory Effort


Cardiovascular: Reports: Regular Rate, Regular Rhythm


GI/Abdominal Exam: Normal Bowel Sounds, Soft, Non-Tender, No Distention


 (Female) Exam: Deferred


Rectal (Female) Exam: Deferred


Back Exam: Reports: Normal Inspection


Extremities: Joint Swelling, Limited Range of Motion, Other (extension lag, 

flexion almost 90 degrees)


Skin: Reports: Warm, Dry


Wound/Incisions: Reports: Healing Well, Other (two small tradtion blisters from 

SteriStrips)


Neurological: Reports: No New Focal Deficit


Psy/Mental Status: Reports: Alert, Normal Affect, Normal Mood

## 2020-06-18 NOTE — PCM.SURGPN
- General Info


Date of Service: 06/17/20


Date of Surgery/Procedure: 06/15/20


POD#: 2


Functional Status: Reports: Pain Controlled, Tolerating Diet, Ambulating, 

Urinating





- Review of Systems


General: Reports: No Symptoms


HEENT: Reports: No Symptoms


Pulmonary: Reports: Shortness of Breath


Cardiovascular: Reports: No Symptoms


Gastrointestinal: Reports: No Symptoms


Genitourinary: Reports: No Symptoms


Musculoskeletal: Reports: Leg Pain, Joint Swelling


Skin: Reports: No Symptoms


Neurological: Reports: No Symptoms


Psychiatric: Reports: No Symptoms





- Patient Data


Vitals - Most Recent: 


                                Last Vital Signs











Temp  35.2 C L  06/18/20 10:33


 


Pulse  66   06/18/20 10:33


 


Resp  16   06/18/20 10:33


 


BP  96/52 L  06/18/20 10:33


 


Pulse Ox  95   06/18/20 10:33











Weight - Most Recent: 80.014 kg


I&O - Last 24 Hours: 


                                 Intake & Output











 06/17/20 06/18/20 06/18/20





 22:59 06:59 14:59


 


Intake Total 1480 710 600


 


Output Total 1000 900 


 


Balance 480 -190 600











Med Orders - Current: 


                               Current Medications





Acetaminophen (Tylenol Extra Strength)  1,000 mg PO BID PRN


   PRN Reason: Pain


   Last Admin: 06/15/20 13:10 Dose:  1,000 mg


   Documented by: 


Hydrocodone Bitart/Acetaminophen (Norco 325-5 Mg)  2 tab PO Q3H PRN


   PRN Reason: Pain (mild 1-3)


   Last Admin: 06/18/20 07:15 Dose:  2 tab


   Documented by: 


Albuterol (Proventil Neb Soln)  2.5 mg NEB Q4H PRN


   PRN Reason: Shortness of Breath


Albuterol (Ventolin Hfa)  0 gm INH Q6H PRN


   PRN Reason: Shortness of Breath


Aspirin (Ecotrin)  325 mg PO BID [facility]


   Last Admin: 06/18/20 09:40 Dose:  325 mg


   Documented by: 


Bupropion HCl (Wellbutrin Sr)  150 mg PO BID [facility]


   Last Admin: 06/18/20 09:40 Dose:  150 mg


   Documented by: 


Cetirizine HCl (Zyrtec)  10 mg PO DAILY [facility]


   Last Admin: 06/18/20 09:40 Dose:  10 mg


   Documented by: 


Clonazepam (Klonopin)  0.5 mg PO DAILY PRN


   PRN Reason: Anxiety


   Last Admin: 06/16/20 23:00 Dose:  0.5 mg


   Documented by: 


Docusate Sodium (Colace)  100 mg PO BID [facility]


   Last Admin: 06/18/20 09:42 Dose:  100 mg


   Documented by: 


Fluticasone Propionate (Flonase)  0 gm NASBOTH DAILY PRN


   PRN Reason: Allergies


Furosemide (Lasix)  40 mg PO DAILY PRN


   PRN Reason: swelling


Gabapentin (Neurontin)  300 mg PO BEDTIME [facility]


   Last Admin: 06/17/20 20:19 Dose:  300 mg


   Documented by: 


Hydromorphone HCl (Dilaudid)  2 - 4 mg PO Q4H PRN


   PRN Reason: Pain (moderate 4-6)


   Last Admin: 06/16/20 23:03 Dose:  4 mg


   Documented by: 


Sodium Chloride (Normal Saline)  1,000 mls @ 125 mls/hr IV ASDIRECTED [facility]


   Last Admin: 06/16/20 12:34 Dose:  125 mls/hr


   Documented by: 


Lamotrigine (Lamotrigine)  25 mg PO DAILY [facility]


   Last Admin: 06/18/20 09:41 Dose:  25 mg


   Documented by: 


Levothyroxine Sodium (Levothyroxine)  25 mcg PO ACBREAKFAST [facility]


   Last Admin: 06/18/20 07:11 Dose:  25 mcg


   Documented by: 


Meclizine HCl (Antivert)  25 mg PO DAILY [facility]


   Last Admin: 06/18/20 09:41 Dose:  25 mg


   Documented by: 


Morphine Sulfate (Morphine)  2 mg IVPUSH Q1H PRN


   PRN Reason: Breakthrough Pain


   Last Admin: 06/15/20 19:52 Dose:  2 mg


   Documented by: 


Ondansetron HCl (Zofran)  4 mg IVPUSH Q6H PRN


   PRN Reason: Nausea/Vomiting


   Last Admin: 06/17/20 11:08 Dose:  4 mg


   Documented by: 


Pantoprazole Sodium (Protonix***)  40 mg PO BIDAC [facility]


   Last Admin: 06/18/20 07:11 Dose:  40 mg


   Documented by: 


Graciero Ellipta 62.5/ (25mcg Inhaler (Ptom))  1 each INH DAILY [facility]


   Last Admin: 06/18/20 10:35 Dose:  Not Given


   Documented by: 


Sertraline HCl (Zoloft)  150 mg PO DAILY [facility]


   Last Admin: 06/18/20 09:40 Dose:  150 mg


   Documented by: 


Tizanidine HCl (Zanaflex)  4 mg PO Q8H PRN


   PRN Reason: Spasms


   Last Admin: 06/16/20 23:00 Dose:  4 mg


   Documented by: 


Tramadol HCl (Ultram)  50 mg PO Q6H [facility]


   Last Admin: 06/18/20 05:50 Dose:  50 mg


   Documented by: 


Trazodone HCl (Trazodone)  50 mg PO BEDTIME PRN


   PRN Reason: Sleep


   Last Admin: 06/16/20 23:00 Dose:  50 mg


   Documented by: 


Varenicline (Chantix)  1 mg PO BID [facility]


   Last Admin: 06/18/20 09:41 Dose:  1 mg


   Documented by: 





Discontinued Medications





Bandage/Support Products ( Nasal )  1 applic NASBOTH ONETIME ONE


   Stop: 06/15/20 07:01


   Last Admin: 06/15/20 07:11 Dose:  1 applic


   Documented by: 


Bupivacaine HCl (Sensorcaine-Mpf 0.25%) Confirm Administered Dose 10 ml .ROUTE 

.STK-MED ONE


   Stop: 06/15/20 06:43


   Last Admin: 06/15/20 09:00 Dose:  10 ml


   Documented by: 


Bupivacaine HCl (Sensorcaine-Mpf 0.25%) Confirm Administered Dose 10 ml .ROUTE 

.STK-MED ONE


   Stop: 06/15/20 09:01


   Last Admin: 06/15/20 09:00 Dose:  10 ml


   Documented by: 


Bupivacaine HCl (Sensorcaine-Mpf 0.25%) Confirm Administered Dose 10 ml .ROUTE 

.STK-MED ONE


   Stop: 06/15/20 09:01


Dexamethasone (Dexamethasone) Confirm Administered Dose 4 mg .ROUTE .STK-MED ONE


   Stop: 06/15/20 07:23


Fentanyl (Sublimaze) Confirm Administered Dose 250 mcg .ROUTE .STK-MED ONE


   Stop: 06/15/20 07:22


Glycopyrrolate (Robinul) Confirm Administered Dose 1 mg .ROUTE .STK-MED ONE


   Stop: 06/15/20 07:23


Hydroxyzine HCl (Vistaril)  75 mg IM ONETIME ONE


   Stop: 06/15/20 09:46


   Last Admin: 06/15/20 09:42 Dose:  75 mg


   Documented by: 


Cefazolin Sodium/Dextrose 2 gm (/ Premix)  50 mls @ 100 mls/hr IV ONETIME ONE


   Stop: 06/15/20 07:59


   Last Admin: 06/15/20 07:35 Dose:  100 mls/hr


   Documented by: 


Lactated Ringer's (Ringers, Lactated)  1,000 mls @ 75 mls/hr IV ASDIRECTED [facility]


   Last Admin: 06/15/20 07:11 Dose:  75 mls/hr


   Documented by: 


Cefazolin Sodium/Dextrose 1 gm (/ Premix)  50 mls @ 100 mls/hr IV Q8H [facility]


   Stop: 06/16/20 06:59


   Last Admin: 06/16/20 06:09 Dose:  100 mls/hr


   Documented by: 


Ketorolac Tromethamine (Toradol)  30 mg IVPUSH Q6H [facility]


   Stop: 06/17/20 03:01


   Last Admin: 06/17/20 04:00 Dose:  30 mg


   Documented by: 


Lidocaine (Lta 360 Kit Top Soln) Confirm Administered Dose 4 ml .ROUTE .STK-MED 

ONE


   Stop: 06/15/20 07:44


Midazolam HCl (Versed 1 Mg/Ml)  2 mg IVPUSH ONETIME ONE


   Stop: 06/15/20 10:04


   Last Admin: 06/15/20 10:08 Dose:  2 mg


   Documented by: 


Morphine Sulfate (Morphine)  3 mg IM ONETIME ONE


   Stop: 06/15/20 09:46


   Last Admin: 06/15/20 09:45 Dose:  3 mg


   Documented by: 


Neostigmine Methylsulfate (Neostigmine) Confirm Administered Dose 5 mg .ROUTE 

.STK-MED ONE


   Stop: 06/15/20 07:23


Ondansetron HCl (Zofran) Confirm Administered Dose 4 mg .ROUTE .STK-MED ONE


   Stop: 06/15/20 07:23


Povidone Iodine (Betadine 10% Soln) Confirm Administered Dose 1 ml .ROUTE .STK-

MED ONE


   Stop: 06/15/20 06:43


   Last Admin: 06/15/20 08:21 Dose:  1 ml


   Documented by: 


Propofol (Diprivan  20 Ml) Confirm Administered Dose 200 mg .ROUTE .STK-MED ONE


   Stop: 06/15/20 07:23


Rocuronium Bromide (Zemuron) Confirm Administered Dose 50 mg .ROUTE .STK-MED ONE


   Stop: 06/15/20 07:23


Tramadol HCl (Ultram)  50 mg PO Q6H [facility]


   Last Admin: 06/17/20 12:30 Dose:  Not Given


   Documented by: 











- Exam


Wound/Incisions: Healing Well, No Drainage


General: Alert, Oriented


HEENT: Pupils Equal


Neck: Supple


Lungs: Clear to Auscultation, Normal Respiratory Effort


Cardiovascular: Regular Rate, Regular Rhythm


GI/Abdominal Exam: Normal Bowel Sounds, Soft, Non-Tender, No Distention


Extremities: Joint Swelling, Limited Range of Motion


Skin: Warm, Dry


Neurological: No New Focal Deficit


Psy/Mental Status: Alert, Normal Affect, Normal Mood





Sepsis Event Note





- Evaluation


Sepsis Screening Result: No Definite Risk





- Focused Exam


Vital Signs: 


                                   Vital Signs











  Temp Temp Pulse Resp BP Pulse Ox Pulse Ox


 


 06/18/20 10:33   35.2 C L  66  16  96/52 L  95 


 


 06/18/20 09:00        95


 


 06/18/20 07:00   35.3 C L  75  16  102/72  96 


 


 06/18/20 02:42  36.3 C   86  18  100/50 L  96 











Date Exam was Performed: 06/18/20


Time Exam was Performed: 12:27





- Problem List & Annotations


(1) Status post right partial knee replacement


SNOMED Code(s): 284695707, 53397211, 596426941, 382573357


   Code(s): Z96.651 - PRESENCE OF RIGHT ARTIFICIAL KNEE JOINT   Status: Acute   

Current Visit: Yes   





(2) Knee pain, right


SNOMED Code(s): 09327934


   Code(s): M25.561 - PAIN IN RIGHT KNEE   Status: Acute   Current Visit: No   


Qualifiers: 


   Chronicity: chronic 





(3) Osteoarthritis, knee


SNOMED Code(s): 265251688


   Code(s): M17.10 - UNILATERAL PRIMARY OSTEOARTHRITIS, UNSPECIFIED KNEE   

Status: Acute   Current Visit: No   


Qualifiers: 


   Osteoarthritis type: primary   Laterality: right   Qualified Code(s): M17.11 

- Unilateral primary osteoarthritis, right knee   





- Problem List Review


Problem List Initiated/Reviewed/Updated: Yes





- My Orders


Last 24 Hours: 


                               Active Orders 24 hr











 Category Date Time Status


 


 Ready for Discharge [RC] PER UNIT ROUTINE Care  06/18/20 12:20 Ordered


 


 Aspirin [Ecotrin] Med  06/17/20 21:00 Active





 325 mg PO BID   


 


 traMADol [Ultram] Med  06/17/20 12:00 Active





 50 mg PO Q6H   








                                Medication Orders





Acetaminophen (Tylenol Extra Strength)  1,000 mg PO BID PRN


   PRN Reason: Pain


   Last Admin: 06/15/20 13:10  Dose: 1,000 mg


   Documented by: SALINAS


Hydrocodone Bitart/Acetaminophen (Norco 325-5 Mg)  2 tab PO Q3H PRN


   PRN Reason: Pain (mild 1-3)


   Last Admin: 06/18/20 07:15  Dose: 2 tab


   Documented by: GAYLE


   Admin: 06/18/20 00:12  Dose: 2 tab


   Documented by: LONG


   Admin: 06/17/20 20:11  Dose: 2 tab


   Documented by: LONG


   Admin: 06/17/20 16:09  Dose: 2 tab


   Documented by: GAYLE


   Admin: 06/16/20 10:03  Dose: 2 tab


   Documented by: SABINA


   Admin: 06/16/20 06:16  Dose: 2 tab


   Documented by: MATIAS


   Admin: 06/15/20 19:51  Dose: 2 tab


   Documented by: MATIAS


   Admin: 06/15/20 15:20  Dose: 2 tab


   Documented by: MELINDA


Albuterol (Proventil Neb Soln)  2.5 mg NEB Q4H PRN


   PRN Reason: Shortness of Breath


Albuterol (Ventolin Hfa)  0 gm INH Q6H PRN


   PRN Reason: Shortness of Breath


Aspirin (Ecotrin)  325 mg PO BID [facility]


   Last Admin: 06/18/20 09:40  Dose: 325 mg


   Documented by: GAYLE


   Admin: 06/17/20 20:19  Dose: 325 mg


   Documented by: LONG


Bupropion HCl (Wellbutrin Sr)  150 mg PO BID [facility]


   Last Admin: 06/18/20 09:40  Dose: 150 mg


   Documented by: GAYLE


   Admin: 06/17/20 20:19  Dose: 150 mg


   Documented by: LONG


   Admin: 06/17/20 08:15  Dose: 150 mg


   Documented by: GAYLE


   Admin: 06/16/20 20:17  Dose: 150 mg


   Documented by: KOSTAS


   Admin: 06/16/20 10:05  Dose: 150 mg


   Documented by: SABINA


   Admin: 06/15/20 20:58  Dose: 150 mg


   Documented by: MATIAS


Cetirizine HCl (Zyrtec)  10 mg PO DAILY [facility]


   Last Admin: 06/18/20 09:40  Dose: 10 mg


   Documented by: GAYLE


   Admin: 06/17/20 08:14  Dose: 10 mg


   Documented by: GAYLE


   Admin: 06/16/20 10:07  Dose: 10 mg


   Documented by: SABINA


Clonazepam (Klonopin)  0.5 mg PO DAILY PRN


   PRN Reason: Anxiety


   Last Admin: 06/16/20 23:00  Dose: 0.5 mg


   Documented by: BEATRIZ


   Admin: 06/15/20 20:56  Dose: 0.5 mg


   Documented by: MATIAS


Docusate Sodium (Colace)  100 mg PO BID [facility]


   Last Admin: 06/18/20 09:42  Dose: 100 mg


   Documented by: GAYLE


   Admin: 06/17/20 20:19  Dose: 100 mg


   Documented by: LONG


   Admin: 06/17/20 08:15  Dose: 100 mg


   Documented by: GAYLE


   Admin: 06/16/20 20:17  Dose: 100 mg


   Documented by: KOSTAS


   Admin: 06/16/20 10:06  Dose: 100 mg


   Documented by: SABINA


   Admin: 06/15/20 20:59  Dose: 100 mg


   Documented by: MATIAS


Fluticasone Propionate (Flonase)  0 gm NASBOTH DAILY PRN


   PRN Reason: Allergies


Furosemide (Lasix)  40 mg PO DAILY PRN


   PRN Reason: swelling


Gabapentin (Neurontin)  300 mg PO BEDTIME [facility]


   Last Admin: 06/17/20 20:19  Dose: 300 mg


   Documented by: LONG


   Admin: 06/16/20 20:17  Dose: 300 mg


   Documented by: KOSTAS


   Admin: 06/15/20 20:58  Dose: 300 mg


   Documented by: MATIAS


Hydromorphone HCl (Dilaudid)  2 - 4 mg PO Q4H PRN


   PRN Reason: Pain (moderate 4-6)


   Last Admin: 06/16/20 23:03  Dose: 4 mg


   Documented by: BEATRIZ


   Admin: 06/15/20 23:20  Dose: 4 mg


   Documented by: MATIAS


   Admin: 06/15/20 18:36  Dose: 4 mg


   Documented by: MELINDA


   Admin: 06/15/20 11:01  Dose: 4 mg


   Documented by: MELINDA


Sodium Chloride (Normal Saline)  1,000 mls @ 125 mls/hr IV ASDIRECTED [facility]


   Last Admin: 06/16/20 12:34  Dose: 125 mls/hr


   Documented by: SABINA


   Infusion: 06/16/20 11:16  Dose: 125 mls/hr


   Documented by: SABINA


   Admin: 06/16/20 03:16  Dose: 125 mls/hr


   Documented by: MATIAS


   Infusion: 06/16/20 02:36  Dose: 125 mls/hr


   Documented by: MATIAS


   Admin: 06/15/20 18:36  Dose: 125 mls/hr


   Documented by: MELINDA


   Infusion: 06/15/20 18:16  Dose: 125 mls/hr


   Documented by: MELINDA


   Admin: 06/15/20 10:16  Dose: 125 mls/hr


   Documented by: RAFI


Lamotrigine (Lamotrigine)  25 mg PO DAILY [facility]


   Last Admin: 06/18/20 09:41  Dose: 25 mg


   Documented by: GAYLE


   Admin: 06/17/20 08:15  Dose: 25 mg


   Documented by: GAYLE


   Admin: 06/16/20 10:05  Dose: 25 mg


   Documented by: SABINA


   Admin: 06/15/20 14:00  Dose: 25 mg


   Documented by: MELINDA


Levothyroxine Sodium (Levothyroxine)  25 mcg PO ACBREAKFAST [facility]


   Last Admin: 06/18/20 07:11  Dose: 25 mcg


   Documented by: GAYLE


   Admin: 06/17/20 08:14  Dose: 25 mcg


   Documented by: GAYLE


   Admin: 06/16/20 07:23  Dose: 25 mcg


   Documented by: SABINA


   Admin: 06/15/20 13:59  Dose: 25 mcg


   Documented by: MELINDA


Meclizine HCl (Antivert)  25 mg PO DAILY [facility]


   Last Admin: 06/18/20 09:41  Dose: 25 mg


   Documented by: GAYLE


   Admin: 06/17/20 08:14  Dose: 25 mg


   Documented by: GAYLE


   Admin: 06/16/20 10:06  Dose: 25 mg


   Documented by: SABINA


   Admin: 06/15/20 14:00  Dose: 25 mg


   Documented by: MELINDA


Morphine Sulfate (Morphine)  2 mg IVPUSH Q1H PRN


   PRN Reason: Breakthrough Pain


   Last Admin: 06/15/20 19:52  Dose: 2 mg


   Documented by: MATIAS


   Admin: 06/15/20 16:47  Dose: 2 mg


   Documented by: MELINDA


   Admin: 06/15/20 12:33  Dose: 2 mg


   Documented by: FARHAD


Ondansetron HCl (Zofran)  4 mg IVPUSH Q6H PRN


   PRN Reason: Nausea/Vomiting


   Last Admin: 06/17/20 11:08  Dose: 4 mg


   Documented by: SAAD


   Admin: 06/16/20 20:10  Dose: 4 mg


   Documented by: KOSTAS


Pantoprazole Sodium (Protonix***)  40 mg PO BIDAC [facility]


   Last Admin: 06/18/20 07:11  Dose: 40 mg


   Documented by: GAYLE


   Admin: 06/17/20 17:12  Dose: 40 mg


   Documented by: GAYLE


   Admin: 06/17/20 08:14  Dose: 40 mg


   Documented by: GAYLE


   Admin: 06/16/20 16:16  Dose: 40 mg


   Documented by: MIHIR


   Admin: 06/16/20 07:23  Dose: 40 mg


   Documented by: SABINA


   Admin: 06/15/20 16:03  Dose: 40 mg


   Documented by: MELINDA Ha Ellipta 62.5/ (25mcg Inhaler (Ptom))  1 each INH DAILY [facility]


   Last Admin: 06/18/20 10:35 Dose:  Not Given


   Documented by: GAYLE


   Admin: 06/17/20 08:16 Dose:  Not Given


   Documented by: GAYLE


   Admin: 06/16/20 11:17 Dose:  Not Given


   Documented by: SABINA


Sertraline HCl (Zoloft)  150 mg PO DAILY [facility]


   Last Admin: 06/18/20 09:40  Dose: 150 mg


   Documented by: GAYLE


   Admin: 06/17/20 08:15  Dose: 150 mg


   Documented by: GAYLE


   Admin: 06/16/20 10:04  Dose: 150 mg


   Documented by: SABINA


Tizanidine HCl (Zanaflex)  4 mg PO Q8H PRN


   PRN Reason: Spasms


   Last Admin: 06/16/20 23:00  Dose: 4 mg


   Documented by: BEATRIZ


   Admin: 06/15/20 20:57  Dose: 4 mg


   Documented by: MATIAS


Tramadol HCl (Ultram)  50 mg PO Q6H [facility]


   Last Admin: 06/18/20 05:50  Dose: 50 mg


   Documented by: LONG


   Admin: 06/18/20 00:13  Dose: 50 mg


   Documented by: LONG


   Admin: 06/17/20 17:12  Dose: 50 mg


   Documented by: GAYLE


   Admin: 06/17/20 11:28  Dose: 50 mg


   Documented by: SAAD


Trazodone HCl (Trazodone)  50 mg PO BEDTIME PRN


   PRN Reason: Sleep


   Last Admin: 06/16/20 23:00  Dose: 50 mg


   Documented by: BEATRIZ


   Admin: 06/15/20 20:56  Dose: 50 mg


   Documented by: MATIAS


Varenicline (Chantix)  1 mg PO BID [facility]


   Last Admin: 06/18/20 09:41  Dose: 1 mg


   Documented by: GAYLE


   Admin: 06/17/20 20:19  Dose: 1 mg


   Documented by: LONG


   Admin: 06/17/20 08:15  Dose: 1 mg


   Documented by: GAYLE


   Admin: 06/16/20 20:16  Dose: 1 mg


   Documented by: KOSTAS


   Admin: 06/16/20 10:04  Dose: 1 mg


   Documented by: SABINA


   Admin: 06/15/20 20:57  Dose: 1 mg


   Documented by: MATIAS











- Assessment


Assessment           (Free Text/Narrative):: 





Moving today but still concerned about her going home, limited help at home, 

dressing removed, incision healing without complication





- Plan


Plan                        (Free Text/Narrative):: 





Continue PT, work on ambulation and stairs, arrange home health for PT, home 

tomorrow

## 2020-06-26 NOTE — OR
DATE OF PROCEDURE:  06/15/2020

 

SURGEON:  Chandana Oro MD

 

PREOPERATIVE DIAGNOSIS:  Osteoarthritis, medial compartment, right knee.

 

POSTOPERATIVE DIAGNOSIS:  Severe osteoarthritis, medial compartment, right knee.

 

PROCEDURE:  Right medial unicompartmental arthroplasty utilizing [employer] ZUK

components with a size D femur, 2 tibia, and 8 mm polyethylene.

 

ANESTHESIA:  General.

 

INDICATIONS:  Bear is a 64-year-old female with history of progressive pain in her right

knee for the past year.  It has gotten significantly worse over the past few months.  X-rays

revealed medial joint space collapse with osteophyte formation.  She has failed conservative

treatment.  Risks, benefits, and potential complications of procedure were discussed.

 

DESCRIPTION OF PROCEDURE:  After adequate anesthesia was obtained, the patient placed supine

with a tourniquet about the right upper thigh.  Right leg was prepped and draped in a

sterile fashion.  Leg was exsanguinated and tourniquet inflated to 300 mmHg pressure.

Anterior incision was made just slightly medial of midline, carried down to the subcutaneous

tissues, and hemostasis obtained with electrocautery.  Medial parapatellar arthrotomy was

performed and taken up just to the insertion of the VMO.  The anterior horn of the medial

meniscus was excised.  Examination of the medial joint revealed severe degenerative changes

with complete articular cartilage loss of the femoral condyle.  Patellofemoral joint is

inspected and reveals no significant articular cartilage loss.  The knee was flexed and the

anterior lip of the tibial plateau was removed with an oscillating saw.  The knee was then

extended and an extramedullary alignment jig was placed.  This was aligned and secured and

the distal femoral cut was made.  This portion of the jig was removed, the knee was flexed,

and the proximal tibia was then resected with a combination of oscillating and reciprocating

saws.  The cutting jig was removed.  Bone fragment was excised along with the remaining

medial meniscus.  The femoral condyle was then sized to a D component.  The D cutting jig

was secured and remaining femoral cuts were made as well as the peg hole drills.  The

cutting jig was removed along with the bone fragments.  Tibia was then sized to a #2

component, baseplate was tapped into position and secured with small pin, and 2 PEG holes

were drilled.  The femoral trial was placed and an 8 mm trial insert was used and the knee

was taken through range of motion.  This showed good balance in flexion and extension with

full extension and approximately a 2 mm gap.  Trials were removed.

 

The knee was then thoroughly irrigated with pulse lavage, bone surfaces were dried,

components were cemented in place, excess cement was removed, and the knee was held in full

extension with a trial 8 mm tibial polyethylene until the cement cured.  The knee was again

taken through range of motion.  Flexion-extension gaps were evaluated and a 2 mm gap was

present with good balance.  Trials were removed, the knee was irrigated, and the final

polyethylene was snapped

into position.  The knee was irrigated once again including irrigation with a dilute

Betadine solution.  The knee was then closed with an Ethibond suture in the capsular layer

in a running locking fashion.  The skin was closed with 2-0 Vicryl and

a 3-0 Monocryl.  Steri-Strips were applied.  Light compressive dressing was then placed.

The patient tolerated the procedure very well.  There were no complications.  She was taken

from the operating room in stable condition.

 

 

 

 

Chandana Oro MD

DD:  06/26/2020 12:06:56

DT:  06/26/2020 18:16:55

Job #:  [id number]

## 2020-07-23 ENCOUNTER — HOSPITAL ENCOUNTER (OUTPATIENT)
Dept: HOSPITAL 11 - JP.SDS | Age: 65
Discharge: HOME | End: 2020-07-23
Attending: SURGERY
Payer: MEDICARE

## 2020-07-23 VITALS — DIASTOLIC BLOOD PRESSURE: 52 MMHG | SYSTOLIC BLOOD PRESSURE: 110 MMHG | HEART RATE: 54 BPM

## 2020-07-23 DIAGNOSIS — G47.33: ICD-10-CM

## 2020-07-23 DIAGNOSIS — K21.9: ICD-10-CM

## 2020-07-23 DIAGNOSIS — K57.30: Primary | ICD-10-CM

## 2020-07-23 DIAGNOSIS — J43.9: ICD-10-CM

## 2020-07-23 DIAGNOSIS — F17.200: ICD-10-CM

## 2020-07-23 DIAGNOSIS — E78.5: ICD-10-CM

## 2020-07-23 PROCEDURE — 87177 OVA AND PARASITES SMEARS: CPT

## 2020-07-23 PROCEDURE — 45380 COLONOSCOPY AND BIOPSY: CPT

## 2020-07-23 PROCEDURE — 94640 AIRWAY INHALATION TREATMENT: CPT

## 2020-07-23 PROCEDURE — 88313 SPECIAL STAINS GROUP 2: CPT

## 2020-07-23 PROCEDURE — 89055 LEUKOCYTE ASSESSMENT FECAL: CPT

## 2020-07-23 PROCEDURE — 87209 SMEAR COMPLEX STAIN: CPT

## 2020-07-23 PROCEDURE — 87899 AGENT NOS ASSAY W/OPTIC: CPT

## 2020-07-23 PROCEDURE — 87046 STOOL CULTR AEROBIC BACT EA: CPT

## 2020-07-23 PROCEDURE — 88305 TISSUE EXAM BY PATHOLOGIST: CPT

## 2020-07-27 NOTE — OR
DATE OF PROCEDURE:  07/23/2020

 

SURGEON:  Arian August MD

 

PREOPERATIVE DIAGNOSIS:  Frequent loose bowel movements/diarrhea.

 

POSTOPERATIVE DIAGNOSIS:  Frequent loose bowel movements/diarrhea with:

1. Extensive uncomplicated left colonic diverticulosis.

2. Otherwise normal exam.

 

OPERATIVE PROCEDURE:  Flexible colonoscopy with:

1. Collection of stool for microbiologic workup (26006).

2. Random colorectal biopsies to rule out microscopic colitis (95688).

 

ANESTHESIA:  IV sedation.

 

INDICATION FOR PROCEDURE:  A 64-year-old female presenting with some ongoing frequent loose

bowel movements or diarrhea. The plan is to proceed with a colonoscopy with biopsies as

indicated.  We will try to obtain some stool for culture, and if no specific pathology is

identified, we will obtain random colorectal biopsies to rule out microscopic colitis.

Potential risks of the procedure including bleeding and perforation were discussed, and the

patient wishes to proceed.

 

DETAILS OF PROCEDURE:  The patient was taken to the operating room and placed in a left

lateral decubitus position. IV sedation was administered, after which initial digital rectal

exam was performed and was unremarkable. Colonoscope was then passed into the rectum with

retroflexion revealing uncomplicated hemorrhoidal columns.  The scope was eventually passed

to the level of the cecum. The prep was fair, but there was some moderate amount of liquid

stool present which obscured small areas of mucosal surface. The patient had quite extensive

left colonic diverticulosis, but this was otherwise uncomplicated.  Apart from that, there

were no areas of colitis evident, and there were no polyps or other signs of neoplasia. The

stool was evacuated during the course of the procedure and sent for full microbiologic

workup.   Upon retrieval of the scope, multiple biopsies were obtained from the cecum down

through the rectum and sent for histologic evaluation to rule out microscopic colitis.

Minimal bleeding at the biopsy sites was seen and the procedure then concluded. The patient

was taken to the recovery room in satisfactory condition. The patient will be following up

with Jess Quiñonez PA-C, in [facility] in roughly 2 weeks. If nothing comes of

todays cultures or biopsies, one might consider looking at the patient's medication list to

see if there are any medications that may be contributing to the

increase in the bowel movements.  Otherwise, the patient could be treated medically with

agents designed to slow down colonic motility.

 

 

 

 

Arian August MD

DD:  07/25/2020 19:50:26

DT:  07/26/2020 13:45:28

Job #:  [id number]

## 2020-12-09 ENCOUNTER — HOSPITAL ENCOUNTER (EMERGENCY)
Dept: HOSPITAL 11 - JP.ED | Age: 65
Discharge: HOME | End: 2020-12-09
Payer: MEDICARE

## 2020-12-09 VITALS — SYSTOLIC BLOOD PRESSURE: 144 MMHG | HEART RATE: 86 BPM | DIASTOLIC BLOOD PRESSURE: 76 MMHG

## 2020-12-09 DIAGNOSIS — Z90.710: ICD-10-CM

## 2020-12-09 DIAGNOSIS — F41.9: ICD-10-CM

## 2020-12-09 DIAGNOSIS — L02.416: Primary | ICD-10-CM

## 2020-12-09 DIAGNOSIS — K21.9: ICD-10-CM

## 2020-12-09 DIAGNOSIS — F17.210: ICD-10-CM

## 2020-12-09 DIAGNOSIS — F32.9: ICD-10-CM

## 2020-12-09 DIAGNOSIS — Z90.49: ICD-10-CM

## 2020-12-09 DIAGNOSIS — E03.9: ICD-10-CM

## 2020-12-09 DIAGNOSIS — Z79.899: ICD-10-CM

## 2020-12-09 DIAGNOSIS — Z88.5: ICD-10-CM

## 2020-12-09 DIAGNOSIS — J44.9: ICD-10-CM

## 2020-12-09 DIAGNOSIS — E66.9: ICD-10-CM

## 2020-12-09 DIAGNOSIS — Z88.1: ICD-10-CM

## 2020-12-09 NOTE — EDM.PDOC
ED HPI GENERAL MEDICAL PROBLEM





- General


Chief Complaint: Skin Complaint


Stated Complaint: SWOLLEN LEFT LEG


Time Seen by Provider: 20 23:10


Source of Information: Reports: Patient, RN


History Limitations: Reports: No Limitations





- History of Present Illness


INITIAL COMMENTS - FREE TEXT/NARRATIVE: 





65-year-old female with a history of multiple abdominal surgeries with Dr. August

comes now because she is concerned about increasing swelling in her left leg and

discomfort accompanying a left lower abdominal problem that she has had for 

which she is now on 2 antibiotics for the last 1 day.  She had a CAT scan done 

that showed possible abscesses in the left lower quadrant area and some air.  

She apparently was started antibiotics after that was noted and she has a 

appointment to follow-up with Dr. August next week.  She has no nausea vomiting 

or diarrhea or constipation and she is eating all right.  Urine is working well.

 Pain is moderate in the abdomen is nonradiating worse.  Her visit tonight was 

precipitated by swelling in the left leg.  She is on 2 blood thinners however 

and DVT is unlikely.


She has normal vital signs and is nontoxic in appearance


Review of the old records show that she has had multiple surgeries and multiple 

comorbidities indeed


Onset: Gradual


Duration: Day(s):, Week(s):


Location: Reports: Abdomen, Lower Extremity, Left


Quality: Reports: Ache


Severity: Mild


Improves with: Reports: None


Worsens with: Reports: None


  ** Left Groin


Pain Score (Numeric/FACES): 6





- Related Data


                                    Allergies











Allergy/AdvReac Type Severity Reaction Status Date / Time


 


ciprofloxacin Allergy  Hives Verified 20 08:32


 


clonidine Allergy  Other Verified 20 08:32


 


hydromorphone Allergy  Rash Verified 20 08:32


 


oxycodone [From Percocet] Allergy  Hives Verified 20 08:32











Home Meds: 


                                    Home Meds





Levothyroxine Sodium [Synthroid] 25 mcg PO .ACBREAKFAST 01/29/15 [History]


ondansetron HCL [Zofran] 8 mg PO Q8H PRN 05/21/15 [History]


Furosemide 40 mg PO DAILY PRN 17 [History]


Sertraline HCl [Zoloft] 100 mg PO DAILY 17 [History]


traZODone 50 - 100 mg PO BEDTIME PRN 17 [History]


ClonazePAM [KlonoPIN] 0.5 mg PO DAILY PRN 17 [History]


Penciclovir [Denavir 1% Crm] 1 applic TOP ASDIRECTED 17 [History]


Mometasone Furoate [Elocon 0.1% Crm] 1 applic TOP DAILY 18 [History]


tiZANidine [Zanaflex] 4 mg PO Q8H PRN 18 [History]


Pantoprazole [ProTONIX***] 40 mg PO DAILY 19 [History]


buPROPion HCL [Wellbutrin SR] 150 mg PO DAILY 19 [History]


Acetaminophen [Tylenol Extra Strength] 1,000 mg PO BID PRN 20 [History]


Albuterol [Proventil Neb Soln] 3 ml NEB Q4H PRN 20 [History]


Cetirizine [ZyrTEC] 10 mg PO DAILY 20 [History]


Cholecalciferol (Vitamin D3) [Vitamin D3] 2,000 unit PO DAILY 20 [History]


Cyanocobalamin (Vitamin B-12) [B-12] 500 mcg PO DAILY 20 [History]


Gabapentin [Neurontin] 300 mg PO BEDTIME 20 [History]


Lidocaine 2% [Xylocaine 2% Jelly] 1 applic TOP DAILY 20 [History]


Meclizine [Antivert] 25 mg PO .MORNING 20 [History]


Varenicline [Chantix] 1 mg PO BID 20 [History]


Albuterol [Ventolin HFA] 2 puff INH Q6H PRN 20 [History]


Fluticasone Propionate [Flonase] 2 spr NASBOTH DAILY PRN 20 [History]


Homeopathic Products (Leg Cramp Complex Or) 1 - 2 tab PO BEDTIME 20 

[History]


Umeclidinium Brm/Vilanterol Tr [Anoro Ellipta 62.5-25 MCG] 1 each INH DAILY 

20 [History]


Dicyclomine [Bentyl] 10 mg PO QID PRN 20 [History]


Sulfamethoxazole/Trimethoprim [Sulfamethoxazole-Tmp Ds Tablet] 1 each PO 

ASDIRECTED 20 [History]











Past Medical History


HEENT History: Reports: Allergic Rhinitis, Impaired Vision


Other HEENT History: wears glasses


Cardiovascular History: Reports: Cardiomyopathy, Heart Murmur, SOB on Exertion


Respiratory History: Reports: Bronchitis, Recurrent, COPD, Pneumonia, Recurrent,

 Sleep Apnea, SOB


Gastrointestinal History: Reports: Bowel Obstruction, Cholelithiasis, Dive

rticulosis, GERD, Hiatal Hernia


Genitourinary History: Reports: UTI, Recurrent, Other (See Below)


Other Genitourinary History: bladder suspension


OB/GYN History: Reports: Pregnancy, Spontaneous 


Musculoskeletal History: Reports: Arthritis, Back Pain, Chronic, Fibromyalgia, 

Other (See Below)


Other Musculoskeletal History: right knee pain.  ; wound ulcer right lower leg


Neurological History: Reports: Vertigo


Psychiatric History: Reports: Anxiety, Depression, Suicidal Ideation


Endocrine/Metabolic History: Reports: Hypothyroidism, Obesity/BMI 30+


Hematologic History: Reports: None


Immunologic History: Reports: None


Oncologic (Cancer) History: Reports: Cervix, Uterine


Other Oncologic History: complete hysterectomy 2015


Dermatologic History: Reports: Cellulitis, Other (See Below)


Other Dermatologic History: sore on right lower leg that won't go away; [facility] debrid 2020





- Infectious Disease History


Infectious Disease History: Reports: None





- Past Surgical History


Head Surgeries/Procedures: Reports: None


HEENT Surgical History: Reports: Other (See Below)


Other HEENT Surgeries/Procedures: biopsy on tongue


Cardiovascular Surgical History: Reports: None


Respiratory Surgical History: Reports: None


GI Surgical History: Reports: Appendectomy, Cholecystectomy, Colon, Colonoscopy,

 EGD, Hernia Repair/Other


Female  Surgical History: Reports: Hysterectomy, Salpingo-Oophorectomy, Other 

(See Below)


Other Female  Surgeries/Procedures: bladder suspension


Endocrine Surgical History: Reports: None


Neurological Surgical History: Reports: None


Musculoskeletal Surgical History: Reports: None, Knee Replacement


Other Musculoskeletal Surgeries/Procedures:: right partial knee 6/15/20


Oncologic Surgical History: Reports: None


Other Oncologic Surgeries/Procedures: hysterectomy


Dermatological Surgical History: Reports: None





Social & Family History





- Family History


Family Medical History: No Pertinent Family History


Cardiac: Reports: Arrhythmia, Pacemaker, Other (See Below)


Other Cardiac Family History: pacer/defib; valve replacement


Respiratory: Reports: COPD


Musculoskeletal: Reports: Arthritis, Back pain, Chronic, Gout


Endocrine/Metabolic: Reports: Diabetes, type II


Oncologic: Reports: Breast, Cervix, Uterine





- Tobacco Use


Tobacco Use Status *Q: Current Every Day Tobacco User


Years of Tobacco use: 45


Packs/Tins Daily: 0.1





- Caffeine Use


Caffeine Use: Reports: Coffee, Soda


Caffeine Use Comment: 2 cups coffee.  2-6 diet cokes daily





- Recreational Drug Use


Recreational Drug Use: No





- Living Situation & Occupation


Living situation: Reports:  (Lives with her 80+ mother, and her sister 

is in the next house. Has 1 son and 2 grandchildren , who she is currently 

estranged from her child and grandchildren.)





ED ROS GENERAL





- Review of Systems


Review Of Systems: See Below


Constitutional: Reports: No Symptoms


HEENT: Reports: No Symptoms


Respiratory: Reports: No Symptoms


Cardiovascular: Reports: No Symptoms


Endocrine: Reports: No Symptoms


GI/Abdominal: Reports: Abdominal Pain


: Reports: No Symptoms


Musculoskeletal: Reports: Leg Pain


Skin: Reports: Erythema


Neurological: Reports: No Symptoms


Psychiatric: Reports: No Symptoms





ED EXAM, SKIN/RASH


Exam: See Below


Exam Limited By: No Limitations


General Appearance: Alert, No Apparent Distress


Head: Atraumatic, Normocephalic


Neck: Normal Inspection


Respiratory/Chest: No Respiratory Distress


GI/Abdominal: Normal Bowel Sounds, Soft, Other (She has some erythema in the 

left lower quadrant and mild tenderness but no crepitus.  There is no rebound.  

I do not feel in particular masses but she has defects from prior surgery..  So 

a nonacute abdomen)


Back Exam: Normal Inspection


Extremities: Other (I do not really see swelling of the left thigh compared to 

the right thigh as they are both large but she says that it is swollen.)


Neurological: Alert, Oriented, Normal Cognition





Course





- Vital Signs


Text/Narrative:: 





I reviewed the CAT scan and recent lab.  She does not appear to be toxic.  She 

does not need IV antibiotics at this juncture as she has just started 1 day of 2

 antibiotics.  She does not need surgery at this moment.  Does not appear to 

reason for in admission.  My recommendation be to continue her antibiotics and 

call Dr. August's office tomorrow if she continues to worsen or return here.  

Otherwise keep on her antibiotics and follow-up with him


Last Recorded V/S: 





                                Last Vital Signs











Temp  36.5 C   20 22:38


 


Pulse  86   20 22:38


 


Resp  16   20 22:38


 


BP  144/76 H  20 22:38


 


Pulse Ox  94 L  20 22:38














Departure





- Departure


Time of Disposition: 23:40


Disposition: Home, Self-Care 01


Condition: Good


Clinical Impression: 


 Abscess








- Discharge Information


Referrals: 


Gabriel Schmidt MD [Primary Care Provider] - 


Forms:  ED Department Discharge


Care Plan Goals: 


Call Dr. August for evaluation tomorrow or return for new or worse symptoms





Sepsis Event Note (ED)





- Evaluation


Sepsis Screening Result: No Definite Risk





- Focused Exam


Vital Signs: 





                                   Vital Signs











  Temp Pulse Resp BP Pulse Ox


 


 20 22:38  36.5 C  86  16  144/76 H  94 L


 


 20 22:16  36.5 C  86  16  144/76 H  94 L

## 2020-12-31 ENCOUNTER — HOSPITAL ENCOUNTER (INPATIENT)
Dept: HOSPITAL 11 - JP.SDSSCHI | Age: 65
LOS: 6 days | Discharge: HOME | DRG: 330 | End: 2021-01-06
Attending: SURGERY | Admitting: SURGERY
Payer: MEDICARE

## 2020-12-31 DIAGNOSIS — K21.9: ICD-10-CM

## 2020-12-31 DIAGNOSIS — E55.9: ICD-10-CM

## 2020-12-31 DIAGNOSIS — E66.9: ICD-10-CM

## 2020-12-31 DIAGNOSIS — E53.8: ICD-10-CM

## 2020-12-31 DIAGNOSIS — M54.5: ICD-10-CM

## 2020-12-31 DIAGNOSIS — K57.20: Primary | ICD-10-CM

## 2020-12-31 DIAGNOSIS — I42.2: ICD-10-CM

## 2020-12-31 DIAGNOSIS — F17.210: ICD-10-CM

## 2020-12-31 DIAGNOSIS — Z79.899: ICD-10-CM

## 2020-12-31 DIAGNOSIS — I73.9: ICD-10-CM

## 2020-12-31 DIAGNOSIS — Z88.5: ICD-10-CM

## 2020-12-31 DIAGNOSIS — E03.9: ICD-10-CM

## 2020-12-31 DIAGNOSIS — Z79.82: ICD-10-CM

## 2020-12-31 DIAGNOSIS — K66.0: ICD-10-CM

## 2020-12-31 DIAGNOSIS — E78.00: ICD-10-CM

## 2020-12-31 DIAGNOSIS — Z20.822: ICD-10-CM

## 2020-12-31 DIAGNOSIS — G89.29: ICD-10-CM

## 2020-12-31 DIAGNOSIS — J43.2: ICD-10-CM

## 2020-12-31 DIAGNOSIS — I51.7: ICD-10-CM

## 2020-12-31 DIAGNOSIS — F41.9: ICD-10-CM

## 2020-12-31 DIAGNOSIS — Z88.1: ICD-10-CM

## 2020-12-31 DIAGNOSIS — G47.33: ICD-10-CM

## 2020-12-31 DIAGNOSIS — K63.2: ICD-10-CM

## 2020-12-31 PROCEDURE — U0002 COVID-19 LAB TEST NON-CDC: HCPCS

## 2020-12-31 PROCEDURE — 3E0M05Z INTRODUCTION OF ADHESION BARRIER INTO PERITONEAL CAVITY, OPEN APPROACH: ICD-10-PCS | Performed by: SURGERY

## 2020-12-31 PROCEDURE — P9047 ALBUMIN (HUMAN), 25%, 50ML: HCPCS

## 2020-12-31 PROCEDURE — 0WCG0ZZ EXTIRPATION OF MATTER FROM PERITONEAL CAVITY, OPEN APPROACH: ICD-10-PCS | Performed by: SURGERY

## 2020-12-31 PROCEDURE — P9016 RBC LEUKOCYTES REDUCED: HCPCS

## 2020-12-31 PROCEDURE — 0DB80ZZ EXCISION OF SMALL INTESTINE, OPEN APPROACH: ICD-10-PCS | Performed by: SURGERY

## 2020-12-31 PROCEDURE — C9113 INJ PANTOPRAZOLE SODIUM, VIA: HCPCS

## 2020-12-31 PROCEDURE — 0W9G0ZZ DRAINAGE OF PERITONEAL CAVITY, OPEN APPROACH: ICD-10-PCS | Performed by: SURGERY

## 2020-12-31 RX ADMIN — ONDANSETRON PRN MG: 2 INJECTION, SOLUTION INTRAMUSCULAR; INTRAVENOUS at 21:30

## 2020-12-31 RX ADMIN — LINEZOLID SCH MLS/HR: 600 INJECTION, SOLUTION INTRAVENOUS at 12:35

## 2020-12-31 RX ADMIN — ONDANSETRON PRN MG: 2 INJECTION, SOLUTION INTRAMUSCULAR; INTRAVENOUS at 13:37

## 2021-01-01 RX ADMIN — ONDANSETRON PRN MG: 2 INJECTION, SOLUTION INTRAMUSCULAR; INTRAVENOUS at 13:59

## 2021-01-01 RX ADMIN — FENTANYL CITRATE SCH MCG: 50 INJECTION, SOLUTION INTRAMUSCULAR; INTRAVENOUS at 12:51

## 2021-01-01 RX ADMIN — TIOTROPIUM BROMIDE AND OLODATEROL SCH GM: 3.124; 2.736 SPRAY, METERED RESPIRATORY (INHALATION) at 21:24

## 2021-01-01 RX ADMIN — MAGNESIUM SULFATE IN WATER SCH MLS/HR: 40 INJECTION, SOLUTION INTRAVENOUS at 09:47

## 2021-01-01 RX ADMIN — MAGNESIUM SULFATE IN WATER SCH MLS/HR: 40 INJECTION, SOLUTION INTRAVENOUS at 21:28

## 2021-01-01 RX ADMIN — MULTIPLE VITAMINS W/ MINERALS TAB SCH TAB: TAB at 08:29

## 2021-01-01 RX ADMIN — LINEZOLID SCH MLS/HR: 600 INJECTION, SOLUTION INTRAVENOUS at 01:46

## 2021-01-01 RX ADMIN — Medication PRN EACH: at 09:38

## 2021-01-01 RX ADMIN — ONDANSETRON PRN MG: 2 INJECTION, SOLUTION INTRAMUSCULAR; INTRAVENOUS at 03:47

## 2021-01-01 RX ADMIN — MAGNESIUM SULFATE IN WATER SCH MLS/HR: 40 INJECTION, SOLUTION INTRAVENOUS at 15:49

## 2021-01-01 RX ADMIN — LINEZOLID SCH MLS/HR: 600 INJECTION, SOLUTION INTRAVENOUS at 13:40

## 2021-01-01 RX ADMIN — TIOTROPIUM BROMIDE AND OLODATEROL SCH GM: 3.124; 2.736 SPRAY, METERED RESPIRATORY (INHALATION) at 07:52

## 2021-01-02 PROCEDURE — 30233N1 TRANSFUSION OF NONAUTOLOGOUS RED BLOOD CELLS INTO PERIPHERAL VEIN, PERCUTANEOUS APPROACH: ICD-10-PCS | Performed by: SURGERY

## 2021-01-02 PROCEDURE — XW033N5 INTRODUCTION OF MEROPENEM-VABORBACTAM ANTI-INFECTIVE INTO PERIPHERAL VEIN, PERCUTANEOUS APPROACH, NEW TECHNOLOGY GROUP 5: ICD-10-PCS | Performed by: SURGERY

## 2021-01-02 PROCEDURE — 0WQF0ZZ REPAIR ABDOMINAL WALL, OPEN APPROACH: ICD-10-PCS | Performed by: SURGERY

## 2021-01-02 RX ADMIN — Medication PRN EACH: at 21:58

## 2021-01-02 RX ADMIN — Medication SCH MLS/HR: at 11:06

## 2021-01-02 RX ADMIN — ONDANSETRON PRN MG: 2 INJECTION, SOLUTION INTRAMUSCULAR; INTRAVENOUS at 14:36

## 2021-01-02 RX ADMIN — TIOTROPIUM BROMIDE AND OLODATEROL SCH GM: 3.124; 2.736 SPRAY, METERED RESPIRATORY (INHALATION) at 20:38

## 2021-01-02 RX ADMIN — TIOTROPIUM BROMIDE AND OLODATEROL SCH GM: 3.124; 2.736 SPRAY, METERED RESPIRATORY (INHALATION) at 07:38

## 2021-01-02 RX ADMIN — LINEZOLID SCH MLS/HR: 600 INJECTION, SOLUTION INTRAVENOUS at 13:57

## 2021-01-02 RX ADMIN — Medication SCH MLS/HR: at 15:34

## 2021-01-02 RX ADMIN — MULTIPLE VITAMINS W/ MINERALS TAB SCH TAB: TAB at 12:17

## 2021-01-02 RX ADMIN — MAGNESIUM SULFATE IN WATER SCH MLS/HR: 40 INJECTION, SOLUTION INTRAVENOUS at 11:09

## 2021-01-02 RX ADMIN — MAGNESIUM SULFATE IN WATER SCH MLS/HR: 40 INJECTION, SOLUTION INTRAVENOUS at 04:20

## 2021-01-02 RX ADMIN — MAGNESIUM SULFATE IN WATER SCH MLS/HR: 40 INJECTION, SOLUTION INTRAVENOUS at 15:34

## 2021-01-02 RX ADMIN — MAGNESIUM SULFATE IN WATER SCH MLS/HR: 40 INJECTION, SOLUTION INTRAVENOUS at 22:31

## 2021-01-02 RX ADMIN — LINEZOLID SCH MLS/HR: 600 INJECTION, SOLUTION INTRAVENOUS at 02:00

## 2021-01-02 RX ADMIN — ONDANSETRON PRN MG: 2 INJECTION, SOLUTION INTRAMUSCULAR; INTRAVENOUS at 20:28

## 2021-01-03 RX ADMIN — Medication PRN EACH: at 22:03

## 2021-01-03 RX ADMIN — LINEZOLID SCH MLS/HR: 600 INJECTION, SOLUTION INTRAVENOUS at 01:10

## 2021-01-03 RX ADMIN — TIOTROPIUM BROMIDE AND OLODATEROL SCH GM: 3.124; 2.736 SPRAY, METERED RESPIRATORY (INHALATION) at 07:39

## 2021-01-03 RX ADMIN — ONDANSETRON PRN MG: 2 INJECTION, SOLUTION INTRAMUSCULAR; INTRAVENOUS at 12:03

## 2021-01-03 RX ADMIN — MULTIPLE VITAMINS W/ MINERALS TAB SCH TAB: TAB at 08:25

## 2021-01-03 RX ADMIN — TIOTROPIUM BROMIDE AND OLODATEROL SCH GM: 3.124; 2.736 SPRAY, METERED RESPIRATORY (INHALATION) at 22:06

## 2021-01-03 RX ADMIN — FENTANYL CITRATE SCH MCG: 50 INJECTION, SOLUTION INTRAMUSCULAR; INTRAVENOUS at 10:57

## 2021-01-03 RX ADMIN — MAGNESIUM SULFATE IN WATER SCH MLS/HR: 40 INJECTION, SOLUTION INTRAVENOUS at 04:42

## 2021-01-04 RX ADMIN — HYDROCODONE BITARTRATE AND ACETAMINOPHEN PRN TAB: 5; 325 TABLET ORAL at 12:56

## 2021-01-04 RX ADMIN — POTASSIUM CHLORIDE SCH MEQ: 1500 TABLET, EXTENDED RELEASE ORAL at 11:08

## 2021-01-04 RX ADMIN — ONDANSETRON PRN MG: 2 INJECTION, SOLUTION INTRAMUSCULAR; INTRAVENOUS at 12:14

## 2021-01-04 RX ADMIN — TIOTROPIUM BROMIDE AND OLODATEROL SCH GM: 3.124; 2.736 SPRAY, METERED RESPIRATORY (INHALATION) at 21:49

## 2021-01-04 RX ADMIN — POTASSIUM CHLORIDE SCH MEQ: 1500 TABLET, EXTENDED RELEASE ORAL at 16:35

## 2021-01-04 RX ADMIN — HYDROCODONE BITARTRATE AND ACETAMINOPHEN PRN TAB: 5; 325 TABLET ORAL at 20:31

## 2021-01-04 RX ADMIN — MULTIPLE VITAMINS W/ MINERALS TAB SCH TAB: TAB at 11:10

## 2021-01-04 RX ADMIN — TIOTROPIUM BROMIDE AND OLODATEROL SCH GM: 3.124; 2.736 SPRAY, METERED RESPIRATORY (INHALATION) at 06:59

## 2021-01-04 RX ADMIN — POTASSIUM CHLORIDE SCH MEQ: 1500 TABLET, EXTENDED RELEASE ORAL at 12:56

## 2021-01-04 NOTE — CR
Abdomen 2V AP Flat Upright

 

CLINICAL HISTORY: Assess gastric emptying

 

FINDINGS: There is contrast throughout the colon. There is mild gaseous

distention of colon. The there is some ill-defined contrast in the sigmoid

region. This is likely due to overlapping loops of sigmoid and diverticulosis.

No definite extravasation is identified. There is no contrast in the drain.

 

 

 

IMPRESSION: Stomach is emptied since prior exam

 

Contrast throughout the colon with mild colonic distention. This may be

secondary to some mild ileus.

 

No definite extravasation

## 2021-01-04 NOTE — PN
DATE OF SERVICE:  01/04/2021

 

SUBJECTIVE:  Bear's vital signs have been stable.  She has been afebrile.  She did have a

bowel movement.  Oral intake was 1900.  Urine output 3550.  The pain is uncontrolled with

the PCA.  She will be changed from her PCA.

 

Remainder of review of systems negative for any pertinent positives and negatives.

 

OBJECTIVE:  GENERAL:  Bear Grey is a 65-year-old female.  She is alert and orientated.

VITAL SIGNS:  TPR; 97, 60, 12.  Blood pressure 103/52.

HEENT:  Negative.

NECK:  Supple.

HEART:  Regular rate and rhythm.

LUNGS:  Clear.

ABDOMEN:  Dressings dry and intact.  Abdominal binder is on.

EXTREMITIES:  Without peripheral edema.

 

ASSESSMENT:  Delayed primary closure 01/02/2021 and sigmoid colon resection, removal of

peritoneal mesh, small bowel resection, strictureplasty and insertion of mesh for recurrent

sigmoid colon diverticulosis.  Date of procedure:  12/31/2020.

 

PLAN:

1. Discontinue Pablo catheter.

2. Lasix 20 mg IV now.  Repeat in 8 hours.

3. Albumin 25 mg IV q.4 days.

4. KCl 60 mEq p.o., 3 doses.

5. Check CBC, CMP, mag, phos in a.m.

6. Cefoxitin 2 g IV q.6 hours.

7. Check labs in a.m.

8. Change to Norco 5/325 mg 1 to 2 every 4 hours p.r.n. pain.

9. We will continue use of incentive spirometer.

10.Transfer to second floor.

11.We will evaluate p.r.n. or in a.m.

 

 

 

 

Mariel Lugo PA-C

DD:  01/04/2021 07:22:41

DT:  01/04/2021 07:59:03

Job #:  [id number]

## 2021-01-04 NOTE — CR
UGI Limited

 

HISTORY: Postabdominal surgery

 

FINDINGS: Precontrast image shows an air-fluid level in the stomach. Gas pattern

is nonspecific. There is a surgical drain in the left lower quadrant. Patient

swallowed water-soluble contrast. Upright views of the abdomen show no evidence

of extravasation or obstruction.

 

IMPRESSION: Limited study

 

Status post abdominal surgery

 

No extravasation or obstruction seen

## 2021-01-05 RX ADMIN — HYDROCODONE BITARTRATE AND ACETAMINOPHEN PRN TAB: 5; 325 TABLET ORAL at 18:20

## 2021-01-05 RX ADMIN — MAGNESIUM SULFATE IN WATER SCH MLS/HR: 40 INJECTION, SOLUTION INTRAVENOUS at 09:16

## 2021-01-05 RX ADMIN — Medication PRN EACH: at 21:24

## 2021-01-05 RX ADMIN — BENZOCAINE AND MENTHOL PRN LOZ: 15; 3.6 LOZENGE ORAL at 15:48

## 2021-01-05 RX ADMIN — MAGNESIUM SULFATE IN WATER SCH MLS/HR: 40 INJECTION, SOLUTION INTRAVENOUS at 15:49

## 2021-01-05 RX ADMIN — TIOTROPIUM BROMIDE AND OLODATEROL SCH GM: 3.124; 2.736 SPRAY, METERED RESPIRATORY (INHALATION) at 07:49

## 2021-01-05 RX ADMIN — Medication PRN EACH: at 12:54

## 2021-01-05 RX ADMIN — POTASSIUM CHLORIDE SCH MEQ: 1500 TABLET, EXTENDED RELEASE ORAL at 11:18

## 2021-01-05 RX ADMIN — MULTIPLE VITAMINS W/ MINERALS TAB SCH TAB: TAB at 08:22

## 2021-01-05 RX ADMIN — HYDROCODONE BITARTRATE AND ACETAMINOPHEN PRN TAB: 5; 325 TABLET ORAL at 08:29

## 2021-01-05 RX ADMIN — POTASSIUM CHLORIDE SCH MEQ: 1500 TABLET, EXTENDED RELEASE ORAL at 17:04

## 2021-01-05 RX ADMIN — MAGNESIUM SULFATE IN WATER SCH MLS/HR: 40 INJECTION, SOLUTION INTRAVENOUS at 21:15

## 2021-01-05 RX ADMIN — HYDROCODONE BITARTRATE AND ACETAMINOPHEN PRN TAB: 5; 325 TABLET ORAL at 12:52

## 2021-01-05 RX ADMIN — TIOTROPIUM BROMIDE AND OLODATEROL SCH GM: 3.124; 2.736 SPRAY, METERED RESPIRATORY (INHALATION) at 21:20

## 2021-01-05 RX ADMIN — HYDROCODONE BITARTRATE AND ACETAMINOPHEN PRN TAB: 5; 325 TABLET ORAL at 23:02

## 2021-01-05 RX ADMIN — BENZOCAINE AND MENTHOL PRN LOZ: 15; 3.6 LOZENGE ORAL at 09:16

## 2021-01-05 RX ADMIN — ONDANSETRON PRN MG: 4 TABLET, ORALLY DISINTEGRATING ORAL at 17:04

## 2021-01-05 RX ADMIN — POTASSIUM CHLORIDE SCH MEQ: 1500 TABLET, EXTENDED RELEASE ORAL at 08:21

## 2021-01-05 RX ADMIN — ONDANSETRON PRN MG: 2 INJECTION, SOLUTION INTRAMUSCULAR; INTRAVENOUS at 08:29

## 2021-01-05 NOTE — PN
DATE OF SERVICE:  01/03/2021

 

The patient has been afebrile with stable vital signs.  Blood pressure came up nicely after

some additional transfusions yesterday.  Urine output has been adequate.  She did move her

bowels twice so far and we will advance her diet.  Otherwise, her BNP is up quite a bit.  We

will give her a dose of Lasix this morning.  We will keep that in 8 hours.  Otherwise,

maximize activity and work with pulmonary toilet.

 

 

 

 

Arian August MD

DD:  01/03/2021 07:29:22

DT:  01/05/2021 08:28:15

Job #:  [id number]

## 2021-01-05 NOTE — OR
DATE OF PROCEDURE:  01/02/2021

 

SURGEON:  Arian August MD

 

PREOPERATIVE DIAGNOSIS:  Open abdominal incision.

 

POSTOPERATIVE DIAGNOSIS:  Open abdominal incision.

 

OPERATIVE PROCEDURE:  Delayed primary closure of open abdominal incision.

 

ANESTHESIA:  Local plus IV sedation.

 

INDICATION FOR PROCEDURE:  The patient underwent a drainage of intraabdominal abscess,

removal of infected mesh involving the small bowel fistula 48 hours go.  The skin and

subcutaneous tissue were felt to be high risk for wound infection if they were closed

primarily.  Given this, we packed open for a planned delayed primary closure at this time.

Potential risks of the procedure including bleeding and infection were reviewed and the

patient wishes to proceed.

 

DETAILS OF PROCEDURE:  The patient was taken to the operating room and placed in a supine

position, sitting up roughly 30 degrees so as to minimize aspiration risk.  The IV sedation

was administered and the operative dressing was taken down.  The wound was inspected and

found to be clean.  Abdomen was then prepped and draped.  Using ultrasound guidance,

bilateral transversus abdominis plane blocks were placed and the wound was irrigated with

meropenem-containing saline solution.  Incision was closed with a layer of 2-0 Vicryl stitch

deep and the subcutaneous tissue with superficial subdermal 4-0 Vicryl stitch, and then

staples for the skin.  Dressing was applied. The patient was taken to the recovery room in

satisfactory condition. There were no evident complications.

 

 

 

 

Arian August MD

DD:  01/02/2021 09:05:40

DT:  01/05/2021 09:16:35

Job #:  [id number]

## 2021-01-05 NOTE — PN
DATE OF SERVICE:  01/05/2021

 

SUBJECTIVE:  Bear has had a bowel movement yesterday.  Vital signs have been stable.  MAYA

drain put out 165 mL of a light-red drainage.  Oral intake 1695, urine output 2400.

 

Remainder of review of systems negative for any pertinent positives and negatives.

 

OBJECTIVE:  GENERAL:  Bear Grey is a pleasant 65-year-old female.

VITAL SIGNS:  Height is 5 feet 1 inch, weight is 196 pounds.  TPR is 95.5, 64, 16.  Blood

pressure 105/51.

HEENT:  Negative.

NECK:  Supple.

HEART:  Regular rate and rhythm.

LUNGS:  Reveal decreased breath sounds bilaterally.

EXTREMITIES:  Without peripheral edema.

 

ASSESSMENT:

1. Exploratory laparotomy with lysis of extensive adhesions.

    a.     Small-bowel resection.

    b.     Removal of peritoneal mesh.

    c.     Small bowel strictureplasty.

    d.     Drainage of intraabdominal abscess.

    e.     Placement of Interceed mesh.

2. Small bowel adherent to intraabdominal mesh with small bowel fistula and intraabdominal

    abscess.

3. Extensive intraabdominal adhesions.

4. Uncomplicated sigmoid colon diverticulosis.

 

Date of procedure: 12/31/2020.  Surgeon:  Arian August MD.

 

Delayed primary closure for open abdominal incision 01/02/2021.  Surgeon:  Arian August MD.

 

PLAN:

1. Discontinue MAYA drain.

2. Magnesium 2 g q.6 hours IV x72 hours.

3. Saline lock IV.

4. Check CBC, CMP, phos, BNP, and BMP in a.m.

5. Continue use of incentive spirometer.

6. We will evaluate p.r.n. or in a.m.

 

 

 

Mariel Lugo PA-C

DD:  01/05/2021 07:33:27

DT:  01/05/2021 07:59:42

Job #:  [id number]

## 2021-01-05 NOTE — PN
DATE OF SERVICE:  01/02/2021

 

The patient has been afebrile.  Blood pressure is currently running a little low in the 80

to 100 range, but otherwise, she appears to be clinically fairly stable.  Labs showed her

hemoglobin only went up a little bit to 8.7.  We will give her 1 unit of packed RBCs

additionally today as well some additional Lasix as her BNP also went up.  Potassium and

phosphate are marginally low, we will also be supplementing this.  The patient underwent a

delayed primary closure today.  We will restart some clear liquids with coffee.  Otherwise,

Pablo catheter is coming out, and we will maximize activity and work with pulmonary toilet.

With her lower blood pressures, she is already in ICU, and we will simply switch her to

formal ICU status rather than med overflow at this point.

 

 

 

 

Arian August MD

DD:  01/02/2021 09:04:15

DT:  01/05/2021 10:02:24

Job #:  [id number]

## 2021-01-05 NOTE — PN
DATE OF SERVICE:  01/01/2021

 

The patient is postop day 1 from a small bowel resection along with resection of

intraperitoneal mesh which was involved in small bowel fistula formation. Clinically, she

has done well overnight.  She did have some oozing, required some factor VII along with

tranexamic acid  __________ give her 1 unit of packed RBCs.  Otherwise, respiratory status

appears to be fairly good. Upper GI x-ray showed good flow through the small bowel . Her

stomach empties slowly so prior to the delayed primary closure, we will get abdominal x-ray

tomorrow morning to assess to how much extent there is fluid and air in the stomach.  Also,

we will need to be fairly careful with regard to aspiration risk with a delayed primary

closure, minimizing the amount of sedation  with  significant fluid or air in the stomach

found on the abdominal x-ray. Otherwise, we will actually continue the present antibiotics

consisting of Zyvox and meropenem  __________ results with Gram stain showing gram-negative

rods and gram-positive cocci.

 

 

 

 

Arian August MD

DD:  01/01/2021 08:23:41

DT:  01/05/2021 07:48:14

Job #:  [id number]

## 2021-01-06 VITALS — HEART RATE: 78 BPM | DIASTOLIC BLOOD PRESSURE: 48 MMHG | SYSTOLIC BLOOD PRESSURE: 118 MMHG

## 2021-01-06 RX ADMIN — MAGNESIUM SULFATE IN WATER SCH MLS/HR: 40 INJECTION, SOLUTION INTRAVENOUS at 01:50

## 2021-01-06 RX ADMIN — TIOTROPIUM BROMIDE AND OLODATEROL SCH GM: 3.124; 2.736 SPRAY, METERED RESPIRATORY (INHALATION) at 07:21

## 2021-01-06 RX ADMIN — MAGNESIUM SULFATE IN WATER SCH: 40 INJECTION, SOLUTION INTRAVENOUS at 08:30

## 2021-01-06 RX ADMIN — POTASSIUM CHLORIDE SCH MEQ: 1500 TABLET, EXTENDED RELEASE ORAL at 08:30

## 2021-01-06 RX ADMIN — HYDROCODONE BITARTRATE AND ACETAMINOPHEN PRN TAB: 5; 325 TABLET ORAL at 08:29

## 2021-01-06 RX ADMIN — ONDANSETRON PRN MG: 4 TABLET, ORALLY DISINTEGRATING ORAL at 09:09

## 2021-01-06 RX ADMIN — MULTIPLE VITAMINS W/ MINERALS TAB SCH TAB: TAB at 08:31

## 2021-01-06 NOTE — DISCH
ADMISSION DIAGNOSES:

1. Diverticulosis.

2. Small bowel fistula.

3. Gastroesophageal reflux disease.

4. Hypercholesterolemia.

5. Obstructive sleep apnea.

6. Vitamin D deficiency.

7. Mild concentric left ventricular hypertrophy.

8. Vitamin B12 deficiency.

9. Central lobular emphysema.

10.Peripheral artery disease.

11.Hypertrophic cardiomyopathy.

 

DISCHARGE DIAGNOSES:

1. Exploratory laparotomy with lysis of extensive adhesions.

    a.     Small-bowel resection.

    b.     Removal of peritoneal mesh.

    c.     Small bowel strictureplasty.

    d.     Drainage of intraabdominal abscess.

    e.     Placement of Interceed mesh.

 

POSTOPERATIVE DIAGNOSES:

1. Small bowel adherent to intraabdominal mesh with small bowel fistula and intraabdominal

    abscess.

2. Extensive intraabdominal adhesions.

3. Uncomplicated sigmoid colon diverticulitis.

4. Date of procedure:  12/31/2020.  Surgeon:  Arian August MD.

5. Delayed primary closure for open abdominal incision.  Date of procedure:  01/02/2021.

    Surgeon:  Arian August MD.

 

HISTORY:  Bear Grey is a 65-year-old female who had abdominal pain and diverticulosis.

After preoperative evaluation and discussion of possible risks and possible complications,

she wished to proceed with surgical procedure.

 

HOSPITAL COURSE:  Bear had her surgery on 12/31/2020.  She had no operative complications.

On 01/02/2021, she had delayed primary closure.  She tolerated both operative procedures

without any problems.  She did have a bowel movement.  Her diet was advanced from clear

liquids to a regular diet.  Her BNP was monitored and it did get high enough where she did

require some Lasix and she was working on her pulmonary function and ambulation.  Labs

remained within normal limits.  Lasix was given when indicated and pain was well managed.

She was on a PCA and switched to oral pain medication.  Oral intake adequate.  Pain was

controlled.  Vital signs good and activity was good.  She was able to be discharged to home

on 01/06/2021.

 

PHYSICAL EXAMINATION:

GENERAL:  Bear Grey is a pleasant 65-year-old female.

VITAL SIGNS:  Height is 5 feet 1.42 inches, weight is 196 pounds, BMI 36.5.  TPR 98, 78, 18,

blood pressure 118/48.

HEENT:  Negative.

NECK:  Supple.

HEART:  Regular rate and rhythm.

LUNGS:  Decreased breath sounds.  She does have some rhonchi with coughing and wheezing.

This is normal for her.

ABDOMEN:  Aquacel dressing is on.  It does have some shadowing, will be replaced prior to

discharge.  Abdominal binder is on.

EXTREMITIES:  Without peripheral edema.

 

DISPOSITION:  Discharged to home.

 

CONDITION:  Stable and improving.

 

FOLLOWUP:  Appointment with Mariel Lugo PA-C, on 01/14/2021 at 9:30 a.m.

 

HOME MEDICATIONS:

1. Norco 5/325 mg 1 to 2 q.4 hours p.r.n. pain #42.

2. Nystatin swish and swallow 5 mL p.o. q.6 hours 200 mL given.

3. Diflucan 200 mg p.o. daily for 5 days #5 tablets.

4. Colace 100 mg p.o. b.i.d. #60.

5. She is to resume home medications:

    a.     Tylenol Extra Strength 500 mg q.8 hours p.r.n. pain.

    b.     Vitamin D3 2000 International Units daily.

    c.     Trazodone 50 to 100 p.o. bedtime p.r.n. insomnia.

    d.     Zanaflex 4 mg q.8 hours p.r.n. muscle spasms.

    e.     Zofran 8 mg p.o. q.8 hours p.r.n. nausea.

    f.     Chantix 1 mg p.o. b.i.d.

    g.     Anoro Ellipta 62.5/25 mcg 1 inhalation daily.

    h.     Sertraline/Zoloft 150 p.o. daily.

    i.     Denavir 1% cream 1 applicator topical as directed.

    j.     Protonix 40 mg daily.

    k.     Elocon 0.1% cream 1 topical daily.

    l.     Antivert 25 mg p.o. q.a.m.

    m.     Lidocaine jelly 1 applicator topical daily.

    n.     Levothyroxine 25 mcg p.o. before breakfast.

    o.     Homeopathic leg cramp complex 1 to 2 at bedtime.

    p.     Neurontin 300 mg p.o. at bedtime.

    q.     Lasix 40 mg p.o. daily p.r.n. fluid retention.

    r.     Flonase 2 sprays in each nostril daily p.r.n.

    s.     Voltaren 1% gel topical q.i.d. to affected area.

    t.     Vitamin B12 500 mcg daily.

    u.     Plavix 75 mg p.o. daily.

        I. Clonazepam 0.5 mg p.o. daily p.r.n.

    v.     Zyrtec 81 mg p.o. daily.

        I. Aspirin 81 mg p.o. daily.

    w.     Albuterol 2 puffs inhalation q.12 hours.

    x.     Proventil nebulizer solution 3 mL neb every 4 hours p.r.n.

 

DIET:  Regular diet as tolerated.  Drink 8 to 10 glasses of water a day.

 

ACTIVITY:  No lifting over 10 pounds for 6 weeks.  Walk 6 times daily inside your home.

Driving:  Do not drive for 1 week.  Shower/bathing:  May shower.

 

DISCHARGE INSTRUCTIONS:  Wound incision care:  Keep operative site clean and dry.  Take off

Aquacel dressing in 3 days on Saturday, 01/09/2021.  Wear abdominal binder for 6 weeks and

then as tolerated.  Notify provider if any fever, increased pain, swelling, redness, nausea

or vomiting, and use incentive spirometer 10 times every hour while awake for 1 week.

 

DD:  01/06/2021 08:44:45

DT:  01/06/2021 09:26:19

Job #:  [id number]

## 2021-01-08 ENCOUNTER — HOSPITAL ENCOUNTER (INPATIENT)
Dept: HOSPITAL 11 - JP.ED | Age: 66
LOS: 6 days | Discharge: HOME | DRG: 902 | End: 2021-01-14
Attending: SURGERY | Admitting: SURGERY
Payer: MEDICARE

## 2021-01-08 DIAGNOSIS — G89.29: ICD-10-CM

## 2021-01-08 DIAGNOSIS — I42.9: ICD-10-CM

## 2021-01-08 DIAGNOSIS — Z85.41: ICD-10-CM

## 2021-01-08 DIAGNOSIS — B95.2: ICD-10-CM

## 2021-01-08 DIAGNOSIS — Z87.01: ICD-10-CM

## 2021-01-08 DIAGNOSIS — F32.9: ICD-10-CM

## 2021-01-08 DIAGNOSIS — Z79.899: ICD-10-CM

## 2021-01-08 DIAGNOSIS — R10.30: ICD-10-CM

## 2021-01-08 DIAGNOSIS — Z98.890: ICD-10-CM

## 2021-01-08 DIAGNOSIS — K57.90: ICD-10-CM

## 2021-01-08 DIAGNOSIS — B96.1: ICD-10-CM

## 2021-01-08 DIAGNOSIS — Z88.1: ICD-10-CM

## 2021-01-08 DIAGNOSIS — M19.90: ICD-10-CM

## 2021-01-08 DIAGNOSIS — Z85.42: ICD-10-CM

## 2021-01-08 DIAGNOSIS — Z88.8: ICD-10-CM

## 2021-01-08 DIAGNOSIS — M54.9: ICD-10-CM

## 2021-01-08 DIAGNOSIS — E66.9: ICD-10-CM

## 2021-01-08 DIAGNOSIS — F41.9: ICD-10-CM

## 2021-01-08 DIAGNOSIS — Z88.5: ICD-10-CM

## 2021-01-08 DIAGNOSIS — Z79.890: ICD-10-CM

## 2021-01-08 DIAGNOSIS — Z79.02: ICD-10-CM

## 2021-01-08 DIAGNOSIS — Z88.6: ICD-10-CM

## 2021-01-08 DIAGNOSIS — L02.211: ICD-10-CM

## 2021-01-08 DIAGNOSIS — H54.7: ICD-10-CM

## 2021-01-08 DIAGNOSIS — J44.9: ICD-10-CM

## 2021-01-08 DIAGNOSIS — K44.9: ICD-10-CM

## 2021-01-08 DIAGNOSIS — M79.7: ICD-10-CM

## 2021-01-08 DIAGNOSIS — Z87.891: ICD-10-CM

## 2021-01-08 DIAGNOSIS — Z90.710: ICD-10-CM

## 2021-01-08 DIAGNOSIS — I96: ICD-10-CM

## 2021-01-08 DIAGNOSIS — B96.20: ICD-10-CM

## 2021-01-08 DIAGNOSIS — G47.30: ICD-10-CM

## 2021-01-08 DIAGNOSIS — T85.79XA: ICD-10-CM

## 2021-01-08 DIAGNOSIS — Z90.49: ICD-10-CM

## 2021-01-08 DIAGNOSIS — K21.9: ICD-10-CM

## 2021-01-08 DIAGNOSIS — K91.870: Primary | ICD-10-CM

## 2021-01-08 DIAGNOSIS — E03.9: ICD-10-CM

## 2021-01-08 DIAGNOSIS — Z20.822: ICD-10-CM

## 2021-01-08 DIAGNOSIS — Z79.82: ICD-10-CM

## 2021-01-08 PROCEDURE — 0JB80ZZ EXCISION OF ABDOMEN SUBCUTANEOUS TISSUE AND FASCIA, OPEN APPROACH: ICD-10-PCS | Performed by: SURGERY

## 2021-01-08 PROCEDURE — XW033N5 INTRODUCTION OF MEROPENEM-VABORBACTAM ANTI-INFECTIVE INTO PERIPHERAL VEIN, PERCUTANEOUS APPROACH, NEW TECHNOLOGY GROUP 5: ICD-10-PCS | Performed by: SURGERY

## 2021-01-08 PROCEDURE — C9113 INJ PANTOPRAZOLE SODIUM, VIA: HCPCS

## 2021-01-08 PROCEDURE — 0WPF0JZ REMOVAL OF SYNTHETIC SUBSTITUTE FROM ABDOMINAL WALL, OPEN APPROACH: ICD-10-PCS | Performed by: SURGERY

## 2021-01-08 PROCEDURE — U0002 COVID-19 LAB TEST NON-CDC: HCPCS

## 2021-01-08 PROCEDURE — 0FB20ZZ EXCISION OF LEFT LOBE LIVER, OPEN APPROACH: ICD-10-PCS | Performed by: SURGERY

## 2021-01-08 PROCEDURE — 0WQF0ZZ REPAIR ABDOMINAL WALL, OPEN APPROACH: ICD-10-PCS | Performed by: SURGERY

## 2021-01-08 PROCEDURE — P9016 RBC LEUKOCYTES REDUCED: HCPCS

## 2021-01-08 PROCEDURE — 0W9G0ZZ DRAINAGE OF PERITONEAL CAVITY, OPEN APPROACH: ICD-10-PCS | Performed by: SURGERY

## 2021-01-08 RX ADMIN — LINEZOLID SCH MLS/HR: 600 INJECTION, SOLUTION INTRAVENOUS at 17:31

## 2021-01-08 RX ADMIN — NYSTATIN SCH ML: 500000 SUSPENSION ORAL at 22:44

## 2021-01-08 RX ADMIN — Medication PRN ML: at 15:40

## 2021-01-08 RX ADMIN — FENTANYL CITRATE PRN MCG: 50 INJECTION, SOLUTION INTRAMUSCULAR; INTRAVENOUS at 23:06

## 2021-01-08 RX ADMIN — Medication PRN ML: at 15:35

## 2021-01-08 NOTE — CRLCT
INDICATION:



Check for postsurgical infection. There has reportedly been interval bowel 

resection.



TECHNIQUE:



CT of the abdomen and pelvis with 135 cc Isovue 300 IV contrast. Coronal 

and sagittal reconstructions.



COMPARISON:



CT of the abdomen and pelvis 12/30/2020. 



FINDINGS:



There is a new healing midline anterior abdominal wall wound with overlying 

skin staples. Prior postoperative changes of the anterior abdominal wall 

and left inguinal region with multiple surgical fasteners. 



There is a new 5.4 x 4.8 cm high density fluid collection in the left 

abdomen which most likely represents postoperative hematoma (series 2, 

image 53). 



There is a new gas and fluid collection in subjacent to the anterior 

abdominal wall measuring approximately 2.4 x 12.3 cm (series 2, image 87). 

There is a peripherally enhancing rim around portions of the fluid 

collection, however the posterior margin is not encapsulated. This most 

likely represents a developing abscess. 



Additional smaller peripherally enhancing fluid collection subjacent to the 

left anterior abdominal wall measuring 4.9 x 2.2 cm (series 2, image 89). 

This has a different, more simple fluid appearance than the previously seen 

ill-defined soft tissue thickening and gas in this region on the prior 

exam. Small amount of free fluid in the pelvis.



The liver, spleen, pancreas, and right adrenal gland are negative. Hepatic 

and portal veins are patent. Cholecystectomy. Stable mild nonspecific 

thickening of the left adrenal gland. 



Symmetric enhancement of the kidneys. Mildly prominent extrarenal pelves 

bilaterally. No ureteral dilation. No obstructing urinary calculi. The 

bladder is normal in appearance. Stable soft tissue thickening in the left 

inguinal region adjacent to the left anterior aspect of the bladder which 

could be related to an inguinal hernia repair. Hysterectomy. 



No bowel dilation. Multiple bowel anastomoses. Colonic diverticulosis. 

Chronic wall thickening of the sigmoid colon. No definite evidence of acute 

diverticulitis. 



Aortoiliac vascular calcifications. Short stent extending from the aortic 

bifurcation into the proximal right common iliac artery. No 

lymphadenopathy. Degenerative changes of the spine. Chronic mild superior 

endplate compression fracture of T9. 



Emphysema in the lung bases. Coronary artery calcifications.



IMPRESSION:



1. There has reportedly been interval bowel resection with a new healing 

midline anterior abdominal wall wound. Multiple bowel anastomoses are seen. 

No evidence of bowel obstruction. 



2. New 5.4 x 4.8 cm hematoma in the left abdomen which is likely 

postoperative in nature. 



3. New partially encapsulated gas and fluid collection subjacent to the 

anterior abdominal wall measuring approximately 2.4 x 12.3 cm, worrisome 

for developing abscess. 



4. New 4.9 x 2.2 cm peripherally enhancing fluid collection subjacent to 

the left anterior abdominal wall. This has a different, more simple fluid 

appearance than the previously seen ill-defined soft tissue thickening and 

gas in this region on the prior exam. 



5. Stable soft tissue thickening in the left inguinal region adjacent to 

the left anterior aspect of the bladder. This could be related to a prior 

inguinal hernia repair.



Please note that all CT scans at this facility use dose modulation, 

iterative reconstruction, and/or weight-based dosing when appropriate to 

reduce radiation dose to as low as reasonably achievable.



Dictated by Mariel Alston MD @ Jan 8 2021  4:14PM



Signed by Dr. Mariel Alston @ Jan 8 2021  4:44PM

## 2021-01-08 NOTE — EDM.PDOC
ED HPI GENERAL MEDICAL PROBLEM





- General


Chief Complaint: Abdominal Pain


Stated Complaint: TEMP/PAIN AFTER SURGERY


Time Seen by Provider: 21 14:35


Source of Information: Reports: Patient


History Limitations: Reports: No Limitations





- History of Present Illness


INITIAL COMMENTS - FREE TEXT/NARRATIVE: 





65-year-old female who was discharged from the hospital 2 days ago after 

abdominal surgery, presents with temperature at home, increased nausea and pain 

over the past 12 to 24 hours.  Her temperature at home was "101.5", here it is 

normal.  She is having increased drainage from her bandages over the abdomen.  

Some mild bloated sensation of the abdomen but no radiation of pain to the back.

 No vomiting.


Onset: Gradual


Duration: Day(s): (Seems worse over the last day to 2 days)


Location: Reports: Abdomen


Associated Symptoms: Reports: Fever/Chills, Loss of Appetite, Malaise, 

Nausea/Vomiting.  Denies: Shortness of Breath


  ** 5


Pain Score (Numeric/FACES): 7





- Related Data


                                    Allergies











Allergy/AdvReac Type Severity Reaction Status Date / Time


 


ciprofloxacin Allergy  Hives Verified 21 14:12


 


clonidine Allergy  Other Verified 21 14:12


 


hydromorphone Allergy  Rash Verified 21 14:12


 


oxycodone [From Percocet] Allergy  Hives Verified 21 14:12











Home Meds: 


                                    Home Meds





Levothyroxine Sodium [Synthroid] 25 mcg PO .ACBREAKFAST 01/29/15 [History]


ondansetron HCL [Zofran] 8 mg PO Q8H PRN 05/21/15 [History]


Furosemide 40 mg PO DAILY PRN 17 [History]


Sertraline HCl [Zoloft] 150 mg PO DAILY 17 [History]


traZODone 50 - 100 mg PO BEDTIME PRN 17 [History]


ClonazePAM [KlonoPIN] 0.5 mg PO DAILY PRN 17 [History]


Penciclovir [Denavir 1% Crm] 1 applic TOP ASDIRECTED 17 [History]


Mometasone Furoate [Elocon 0.1% Crm] 1 applic TOP DAILY 18 [History]


tiZANidine [Zanaflex] 4 mg PO Q8H PRN 18 [History]


Pantoprazole [ProTONIX***] 40 mg PO DAILY 19 [History]


Albuterol [Proventil Neb Soln] 3 ml NEB Q4H PRN 20 [History]


Cetirizine [ZyrTEC] 10 mg PO DAILY 20 [History]


Cholecalciferol (Vitamin D3) [Vitamin D3] 2,000 unit PO DAILY 20 [History]


Cyanocobalamin (Vitamin B-12) [B-12] 500 mcg PO DAILY 20 [History]


Gabapentin [Neurontin] 300 mg PO BEDTIME 20 [History]


Lidocaine 2% [Xylocaine 2% Jelly] 1 applic TOP DAILY 20 [History]


Meclizine [Antivert] 25 mg PO .MORNING 20 [History]


Varenicline [Chantix] 1 mg PO BID 20 [History]


Albuterol [Ventolin HFA] 2 puff INH Q6H PRN 20 [History]


Fluticasone Propionate [Flonase] 2 spr NASBOTH DAILY PRN 20 [History]


Homeopathic Products (Leg Cramp Complex Or) 1 - 2 tab PO BEDTIME 20 

[History]


Umeclidinium Brm/Vilanterol Tr [Anoro Ellipta 62.5-25 MCG] 1 each INH DAILY 

20 [History]


Aspirin [Halfprin] 81 mg PO DAILY 12/15/20 [History]


Clopidogrel [Plavix] 75 mg PO DAILY 20 [History]


Diclofenac Sodium [Voltaren 1% Gel] 1 applic TOP QID 20 [History]


Acetaminophen/HYDROcodone [Norco 325-5 MG] 1 - 2 tab PO Q4H PRN #42 tablet 

21 [Rx]


Docusate Sodium [Colace] 100 mg PO BID #60 cap 21 [Rx]


Fluconazole [Diflucan] 200 mg PO DAILY #5 tablet 21 [Rx]


Nystatin [Mycostatin] 5 ml PO Q6H #200 ml 21 [Rx]











Past Medical History


HEENT History: Reports: Allergic Rhinitis, Impaired Vision


Other HEENT History: wears glasses


Cardiovascular History: Reports: Cardiomyopathy, Heart Murmur, SOB on Exertion


Respiratory History: Reports: Bronchitis, Recurrent, COPD, Pneumonia, Recurrent,

 Sleep Apnea, SOB


Gastrointestinal History: Reports: Bowel Obstruction, Cholelithiasis, Chronic 

Diarrhea, Diverticulosis, GERD, Hiatal Hernia


Genitourinary History: Reports: UTI, Recurrent, Other (See Below)


Other Genitourinary History: bladder suspension


OB/GYN History: Reports: Pregnancy, Spontaneous 


Musculoskeletal History: Reports: Arthritis, Back Pain, Chronic, Fibromyalgia, 

Other (See Below)


Other Musculoskeletal History: right knee pain.  ; wound ulcer right lower leg


Neurological History: Reports: Vertigo


Psychiatric History: Reports: Anxiety, Depression, Suicidal Ideation


Endocrine/Metabolic History: Reports: Hypothyroidism, Obesity/BMI 30+


Hematologic History: Reports: None


Immunologic History: Reports: None


Oncologic (Cancer) History: Reports: Cervix, Uterine


Other Oncologic History: complete hysterectomy 2015


Dermatologic History: Reports: Cellulitis, Other (See Below)


Other Dermatologic History: sore on right lower leg that won't go away; [facility] debrid 2020





- Infectious Disease History


Infectious Disease History: Reports: Chicken Pox, MRSA, Other (See Below)


Other Infectious Disease History: MRSA in right leg in 2019





- Past Surgical History


Head Surgeries/Procedures: Reports: None


HEENT Surgical History: Reports: Other (See Below)


Other HEENT Surgeries/Procedures: biopsy on tongue


Cardiovascular Surgical History: Reports: Varicose, Other (See Below)


Other Cardiovascular Surgeries/Procedures: RFA leg veins


Respiratory Surgical History: Reports: None


GI Surgical History: Reports: Appendectomy, Cholecystectomy, Colon, Colonoscopy,

 EGD, Hernia Repair/Other


Female  Surgical History: Reports: Hysterectomy, Salpingo-Oophorectomy, Other 

(See Below)


Other Female  Surgeries/Procedures: bladder suspension


Endocrine Surgical History: Reports: None


Neurological Surgical History: Reports: None


Musculoskeletal Surgical History: Reports: None, Knee Replacement


Other Musculoskeletal Surgeries/Procedures:: right partial knee 6/15/20


Oncologic Surgical History: Reports: None


Other Oncologic Surgeries/Procedures: hysterectomy


Dermatological Surgical History: Reports: None





Social & Family History





- Family History


Family Medical History: No Pertinent Family History


Cardiac: Reports: Arrhythmia, Pacemaker, Other (See Below)


Other Cardiac Family History: pacer/defib; valve replacement


Respiratory: Reports: COPD


Musculoskeletal: Reports: Arthritis, Back pain, Chronic, Gout


Endocrine/Metabolic: Reports: Diabetes, type II


Oncologic: Reports: Breast, Cervix, Uterine





- Tobacco Use


Tobacco Use Status *Q: Former Tobacco User


Used Tobacco, but Quit: Yes


Month/Year Tobacco Last Used: 





- Caffeine Use


Caffeine Use: Reports: Coffee


Caffeine Use Comment: 2 cups coffee.  2-6 diet cokes daily





- Living Situation & Occupation


Living situation: Reports:  (Lives with her 80+ mother, and her sister 

is in the next house. Has 1 son and 2 grandchildren , who she is currently 

estranged from her child and grandchildren.)





ED ROS GENERAL





- Review of Systems


Review Of Systems: See Below


Constitutional: Reports: Fever, Chills, Malaise, Decreased Appetite


HEENT: Reports: No Symptoms


Respiratory: Denies: Shortness of Breath


Cardiovascular: Denies: Chest Pain


GI/Abdominal: Reports: Abdominal Pain, Nausea.  Denies: Constipation, Diarrhea, 

Vomiting


Skin: Reports: Bruising (Some bruising on the extremities from recent IVs)


Neurological: Reports: No Symptoms


Psychiatric: Reports: Anxiety





ED EXAM, GI/ABD





- Physical Exam


Exam: See Below


Exam Limited By: No Limitations


General Appearance: Alert, No Apparent Distress


Eyes: Bilateral: Normal Appearance (No jaundice)


Head: Atraumatic


Respiratory/Chest: No Respiratory Distress, Lungs Clear


Cardiovascular: Regular Rate, Rhythm.  No: Tachycardia


GI/Abdominal Exam: Normal Bowel Sounds, Other (Some fullness is palpated 

underneath the incision, there is significant somewhat purulent appearing but 

none odorous drainage through the center of the incision.)


Extremities: Normal Inspection (Normal other than some bruises on her arms from 

recent IV starts)


Neurological: Alert, Oriented


Psychiatric: Normal Affect, Normal Mood





Course





- Vital Signs


Last Recorded V/S: 


                                Last Vital Signs











Temp  97.2 F   21 12:45


 


Pulse  85   21 12:45


 


Resp  12   21 12:45


 


BP  86/48 L  21 12:45


 


Pulse Ox  90 L  21 12:45














- Orders/Labs/Meds


Orders: 


                                Medication Orders





Hydrocodone Bitart/Acetaminophen (Norco 325-5 Mg)  1 - 2 tab PO Q4H PRN


   PRN Reason: Pain


   Last Admin: 21 11:24  Dose: 2 tab


   Documented by: ZITA


   Admin: 21 07:28  Dose: 2 tab


   Documented by: ZITA


Albuterol/Ipratropium (Duoneb 3.0-0.5 Mg/3 Ml)  3 ml INH QIDRT [facility]


   Last Admin: 21 11:22  Dose: 3 ml


   Documented by: CANDACE


   Admin: 21 07:10  Dose: 3 ml


   Documented by: CANDACE


   Admin: 01/10/21 20:37  Dose: 3 ml


   Documented by: ESDRAS


   Admin: 01/10/21 14:25  Dose: 3 ml


   Documented by: GERALD


   Admin: 01/10/21 10:35  Dose: 3 ml


   Documented by: GERALD


   Admin: 01/10/21 07:05  Dose: 3 ml


   Documented by: GERALD


   Admin: 21 20:46  Dose: 3 ml


   Documented by: ESDRAS


   Admin: 21 14:41  Dose: 3 ml


   Documented by: GERALD


Albuterol/Ipratropium (Duoneb 3.0-0.5 Mg/3 Ml)  3 ml INH ASDIRECTED PRN


   PRN Reason: BREATHING


Aspirin (Halfprin)  81 mg PO DAILY [facility]


   Last Admin: 21 08:37  Dose: 81 mg


   Documented by: ZITA


   Admin: 01/10/21 08:37  Dose: 81 mg


   Documented by: ZITA


Bisacodyl (Dulcolax)  10 mg PO BID ABEL


   Last Admin: 21 08:37  Dose: 10 mg


   Documented by: ZITA


   Admin: 01/10/21 20:36  Dose: 10 mg


   Documented by: ESDRAS


   Admin: 01/10/21 08:38  Dose: 10 mg


   Documented by: ZITA


Cetirizine HCl (Zyrtec)  10 mg PO DAILY [facility]


   Last Admin: 21 08:38  Dose: 10 mg


   Documented by: ZITA


   Admin: 01/10/21 08:38  Dose: 10 mg


   Documented by: ZITA


Clonazepam (Klonopin)  0.5 mg PO DAILY [facility]


   Last Admin: 21 08:39  Dose: 0.5 mg


   Documented by: ZITA


   Admin: 01/10/21 08:44  Dose: 0.5 mg


   Documented by: ZITA


Clopidogrel Bisulfate (Plavix)  75 mg PO DAILY [facility]


   Last Admin: 21 08:37  Dose: 75 mg


   Documented by: ZITA


   Admin: 01/10/21 08:38  Dose: 75 mg


   Documented by: ZITA


Diclofenac Sodium (Voltaren 1% Gel)  0 gm TOP DAILY [facility]


   Last Admin: 21 08:40 Dose:  Not Given


   Documented by: ZITA


   Admin: 01/10/21 09:38 Dose:  Not Given


   Documented by: ZITA


   Admin: 21 14:01 Dose:  Not Given


   Documented by: ZITA


Diphenhydramine HCl (Benadryl)  25 - 50 mg IVPUSH Q4H PRN


   PRN Reason: Itching


   Last Admin: 01/10/21 15:43  Dose: 25 mg


   Documented by: ZITA


Docusate Sodium (Colace)  100 mg PO BID [facility]


   Last Admin: 21 08:37  Dose: 100 mg


   Documented by: ZITA


   Admin: 01/10/21 20:36  Dose: 100 mg


   Documented by: ESDRAS


   Admin: 01/10/21 08:38  Dose: 100 mg


   Documented by: ZITA


Fluticasone Propionate (Flonase)  0 gm NASBOTH DAILY [facility]


   Last Admin: 21 08:37  Dose: 2 inh


   Documented by: ZITA


   Admin: 01/10/21 08:39  Dose: 2 inh


   Documented by: ZITA


Furosemide (Lasix)  40 mg PO DAILY [facility]


   Last Admin: 21 08:37  Dose: 40 mg


   Documented by: ZITA


   Admin: 01/10/21 08:38  Dose: 40 mg


   Documented by: ZITA


Gabapentin (Neurontin)  300 mg PO BEDTIME [facility]


   Last Admin: 01/10/21 20:37  Dose: 300 mg


   Documented by: ESDRAS


Hydroxyzine HCl (Vistaril)  100 mg IM Q4H PRN


   PRN Reason: pain


Dextrose/Lactated Ringer's (Dextrose 5%-Lactated Ringers)  1,000 mls @ 125 

mls/hr IV ASDIRECTED [facility]


   Last Admin: 21 05:11  Dose: 125 mls/hr


   Documented by: ESDRAS


   Infusion: 01/10/21 16:45  Dose: 125 mls/hr


   Documented by: ESDRAS


   Admin: 01/10/21 08:45  Dose: 125 mls/hr


   Documented by: ZITA


Magnesium Sulfate (Magnesium Sulfate In Water Premix)  2 gm in 50 mls @ 25 

mls/hr IV Q6H [facility]


   Stop: 21 04:59


   Last Admin: 21 08:35  Dose: 25 mls/hr


   Documented by: ZITA


   Infusion: 21 04:28  Dose: 25 mls/hr


   Documented by: ZITA


   Admin: 21 02:28  Dose: 25 mls/hr


   Documented by: ESDRAS


   Infusion: 01/10/21 22:37  Dose: 25 mls/hr


   Documented by: ESDRAS


   Admin: 01/10/21 20:37  Dose: 25 mls/hr


   Documented by: ESDRAS


   Infusion: 01/10/21 17:16  Dose: 25 mls/hr


   Documented by: ESDRAS


   Admin: 01/10/21 15:16  Dose: 25 mls/hr


   Documented by: ZITA


   Infusion: 01/10/21 11:36  Dose: 25 mls/hr


   Documented by: ZITA


   Admin: 01/10/21 09:36  Dose: 25 mls/hr


   Documented by: ZITA


Ampicillin Sodium/Sulbactam (Sodium 3 gm/ Sodium Chloride)  100 mls @ 200 mls/hr

 IV Q6H [facility]


   Last Admin: 21 09:53  Dose: 200 mls/hr


   Documented by: ZITA


Levothyroxine Sodium (Levothyroxine)  25 mcg PO DAILY@0730 [facility]


   Last Admin: 21 08:36  Dose: 25 mcg


   Documented by: ZITA


   Admin: 01/10/21 08:38  Dose: 25 mcg


   Documented by: ZITA


Lidocaine HCl (Xylocaine 2% Jelly)  0 ml TOP DAILY [facility]


   Last Admin: 21 08:40 Dose:  Not Given


   Documented by: ZITA


   Admin: 01/10/21 09:38 Dose:  Not Given


   Documented by: ZITA


   Admin: 21 14:00 Dose:  Not Given


   Documented by: ZITA


Meclizine HCl (Antivert)  25 mg PO DAILY PRN


   PRN Reason: *


Metoclopramide HCl (Reglan)  10 mg IVPUSH Q6H PRN


   PRN Reason: NAUSEA NOT CONTROL BY ZOFRAN


   Last Admin: 21 21:13  Dose: 10 mg


   Documented by: ESDRAS


Morphine Sulfate (Morphine)  1 - 2 mg IVPUSH Q30M PRN


   PRN Reason: Pain


   Last Admin: 21 04:35  Dose: 2 mg


   Documented by: ESDRAS


   Admin: 21 02:27  Dose: 2 mg


   Documented by: ESDRAS


   Admin: 21 01:51  Dose: 2 mg


   Documented by: ESDRAS


Naloxone HCl (Narcan)  0.1 mg IV ASDIRECTED PRN


   PRN Reason: decreased respiratory rate


Scopolamine Patch (Check)  1 each TOP DAILY [facility]


   Last Admin: 21 08:40 Dose:  Not Given


   Documented by: ZITA


   Admin: 01/10/21 08:41 Dose:  Not Given


   Documented by: ZITA


   Admin: 21 12:23 Dose:  Not Given


   Documented by: ZITA


Homeopathic Products Leg Cramp Complex * *Ptom**  1 - 2 tab PO BEDTIME PRN


   PRN Reason: leg cramp


Nystatin (Mycostatin)  5 ml PO QID [facility]


   Last Admin: 21 09:53  Dose: 5 ml


   Documented by: ZITA


Ondansetron HCl (Zofran)  4 mg IVPUSH Q4H PRN


   PRN Reason: Nausea


   Last Admin: 01/10/21 09:37  Dose: 4 mg


   Documented by: ZITA


   Admin: 21 13:39  Dose: 4 mg


   Documented by: ZITA


   Admin: 21 03:18  Dose: 4 mg


   Documented by: SERGIO


Pantoprazole Sodium (Protonix Iv***)  40 mg IVPUSH Q24H [facility]


   Last Admin: 01/10/21 13:39  Dose: 40 mg


   Documented by: ZITA


   Admin: 21 14:13  Dose: 40 mg


   Documented by: ZITA


Scopolamine (Transderm-Scop)  1.5 mg TOP Q72H [facility]


Sertraline HCl (Zoloft)  150 mg PO DAILY [facility]


   Last Admin: 21 08:37  Dose: 150 mg


   Documented by: ZITA


   Admin: 01/10/21 08:37  Dose: 150 mg


   Documented by: ZITA


Tizanidine HCl (Zanaflex)  4 mg PO Q8H PRN


   PRN Reason: Muscle Spasm


Trazodone HCl (Trazodone)  50 - 100 mg PO BEDTIME PRN


   PRN Reason: SLEEP


   Last Admin: 01/10/21 20:55  Dose: 100 mg


   Documented by: ESDRAS


Varenicline (Chantix)  1 mg PO BID ABEL


   Last Admin: 21 08:36  Dose: 1 mg


   Documented by: ZITA


   Admin: 01/10/21 20:37  Dose: 1 mg


   Documented by: ESDRAS


   Admin: 01/10/21 08:38  Dose: 1 mg


   Documented by: ZITA








Labs: 


                                Laboratory Tests











  21 Range/Units





  14:50 14:50 14:50 


 


WBC  10.4    (4.5-11.0)  K/uL


 


RBC  3.60    (3.30-5.50)  M/uL


 


Hgb  10.8 L    (12.0-15.0)  g/dL


 


Hct  33.2 L    (36.0-48.0)  %


 


MCV  92    (80-98)  fL


 


MCH  30    (27-31)  pg


 


MCHC  33    (32-36)  %


 


Plt Count  346    (150-400)  K/uL


 


Neut % (Auto)  72 H    (36-66)  %


 


Lymph % (Auto)  15 L    (24-44)  %


 


Mono % (Auto)  10 H    (2-6)  %


 


Eos % (Auto)  3    (2-4)  %


 


Baso % (Auto)  0    (0-1)  %


 


Sodium   139 L   (140-148)  mmol/L


 


Potassium   4.1   (3.6-5.2)  mmol/L


 


Chloride   102   (100-108)  mmol/L


 


Carbon Dioxide   27   (21-32)  mmol/L


 


Anion Gap   14.1 H   (5.0-14.0)  mmol/L


 


BUN   10   (7-18)  mg/dL


 


Creatinine   0.9   (0.6-1.0)  mg/dL


 


Est Cr Clr Drug Dosing   47.65   mL/min


 


Estimated GFR (MDRD)   > 60   (>60)  


 


Glucose   83   ()  mg/dL


 


Calcium   8.4 L   (8.5-10.1)  mg/dL


 


C-Reactive Protein    18.98 H  (0.0-0.3)  mg/dL











Meds: 


Medications











Generic Name Dose Route Start Last Admin





  Trade Name Freq  PRN Reason Stop Dose Admin


 


Hydrocodone Bitart/Acetaminophen  1 - 2 tab  21 07:08  21 11:24





  Norco 325-5 Mg  PO   2 tab





  Q4H PRN   Administration





  Pain  


 


Albuterol/Ipratropium  3 ml  21 15:00  21 11:22





  Duoneb 3.0-0.5 Mg/3 Ml  INH   3 ml





  QIDRT ABEL   Administration


 


Albuterol/Ipratropium  3 ml  21 12:00 





  Duoneb 3.0-0.5 Mg/3 Ml  INH  





  ASDIRECTED PRN  





  BREATHING  


 


Aspirin  81 mg  01/10/21 09:00  21 08:37





  Halfprin  PO   81 mg





  DAILY ABEL   Administration


 


Bisacodyl  10 mg  01/10/21 09:00  21 08:37





  Dulcolax  PO   10 mg





  BID ABEL   Administration


 


Cetirizine HCl  10 mg  01/10/21 09:00  21 08:38





  Zyrtec  PO   10 mg





  DAILY ABEL   Administration


 


Clonazepam  0.5 mg  01/10/21 09:00  21 08:39





  Klonopin  PO   0.5 mg





  DAILY ABEL   Administration


 


Clopidogrel Bisulfate  75 mg  01/10/21 09:00  21 08:37





  Plavix  PO   75 mg





  DAILY ABEL   Administration


 


Diclofenac Sodium  0 gm  21 14:00  21 08:40





  Voltaren 1% Gel  TOP   Not Given





  DAILY ABEL  


 


Diphenhydramine HCl  25 - 50 mg  01/10/21 15:26  01/10/21 15:43





  Benadryl  IVPUSH   25 mg





  Q4H PRN   Administration





  Itching  


 


Docusate Sodium  100 mg  01/10/21 09:00  21 08:37





  Colace  PO   100 mg





  BID ABEL   Administration


 


Fluticasone Propionate  0 gm  01/10/21 09:00  21 08:37





  Flonase  NASBOTH   2 inh





  DAILY ABEL   Administration


 


Furosemide  40 mg  01/10/21 09:00  21 08:37





  Lasix  PO   40 mg





  DAILY ABEL   Administration


 


Gabapentin  300 mg  01/10/21 21:00  01/10/21 20:37





  Neurontin  PO   300 mg





  BEDTIME ABEL   Administration


 


Hydroxyzine HCl  100 mg  21 12:00 





  Vistaril  IM  





  Q4H PRN  





  pain  


 


Dextrose/Lactated Ringer's  1,000 mls @ 125 mls/hr  01/10/21 07:45  21 

05:11





  Dextrose 5%-Lactated Ringers  IV   125 mls/hr





  ASDIRECTED ABEL   Administration


 


Magnesium Sulfate  2 gm in 50 mls @ 25 mls/hr  01/10/21 09:00  21 08:35





  Magnesium Sulfate In Water Premix  IV  21 04:59  25 mls/hr





  Q6H [facility]   Administration


 


Ampicillin Sodium/Sulbactam  100 mls @ 200 mls/hr  21 10:00  21 

09:53





  Sodium 3 gm/ Sodium Chloride  IV   200 mls/hr





  Q6H ABEL   Administration


 


Levothyroxine Sodium  25 mcg  01/10/21 09:00  21 08:36





  Levothyroxine  PO   25 mcg





  DAILY@0730 [facility]   Administration


 


Lidocaine HCl  0 ml  21 14:00  21 08:40





  Xylocaine 2% Jelly  TOP   Not Given





  DAILY [facility]  


 


Meclizine HCl  25 mg  01/10/21 07:37 





  Antivert  PO  





  DAILY PRN  





  *  


 


Metoclopramide HCl  10 mg  21 12:00  21 21:13





  Reglan  IVPUSH   10 mg





  Q6H PRN   Administration





  NAUSEA NOT CONTROL BY ZOFRAN  


 


Morphine Sulfate  1 - 2 mg  01/10/21 23:00  21 04:35





  Morphine  IVPUSH   2 mg





  Q30M PRN   Administration





  Pain  


 


Naloxone HCl  0.1 mg  21 11:00 





  Narcan  IV  





  ASDIRECTED PRN  





  decreased respiratory rate  


 


Scopolamine Patch  1 each  21 12:00  21 08:40





  Check  TOP   Not Given





  DAILY [facility]  


 


Homeopathic Products  1 - 2 tab  01/10/21 07:41 





Leg Cramp Complex *  PO  





*Ptom**  BEDTIME PRN  





  leg cramp  


 


Nystatin  5 ml  21 10:00  21 09:53





  Mycostatin  PO   5 ml





  QID [facility]   Administration


 


Ondansetron HCl  4 mg  21 19:00  01/10/21 09:37





  Zofran  IVPUSH   4 mg





  Q4H PRN   Administration





  Nausea  


 


Pantoprazole Sodium  40 mg  21 14:00  01/10/21 13:39





  Protonix Iv***  IVPUSH   40 mg





  Q24H [facility]   Administration


 


Scopolamine  1.5 mg  21 09:00 





  Transderm-Scop  TOP  





  Q72H [facility]  


 


Sertraline HCl  150 mg  01/10/21 09:00  21 08:37





  Zoloft  PO   150 mg





  DAILY ABEL   Administration


 


Tizanidine HCl  4 mg  01/10/21 07:36 





  Zanaflex  PO  





  Q8H PRN  





  Muscle Spasm  


 


Trazodone HCl  50 - 100 mg  01/10/21 07:36  01/10/21 20:55





  Trazodone  PO   100 mg





  BEDTIME PRN   Administration





  SLEEP  


 


Varenicline  1 mg  01/10/21 09:00  21 08:36





  Chantix  PO   1 mg





  BID ABEL   Administration














Discontinued Medications














Generic Name Dose Route Start Last Admin





  Trade Name Freq  PRN Reason Stop Dose Admin


 


Albuterol  2.5 mg  21 17:20 





  Proventil Neb Soln  INH  





  Q4H PRN  





  Shortness of Breath  


 


Albuterol/Ipratropium  3 ml  21 06:46  21 07:05





  Duoneb 3.0-0.5 Mg/3 Ml  NEB  21 06:47  3 ml





  ONETIME ONE   Administration


 


Bupivacaine HCl  Confirm  21 07:30 





  Marcaine 0.5%  Administered  21 07:31 





  Dose  





  50 ml  





  .ROUTE  





  .STK-MED ONE  


 


Ropivacaine 44 ml/  0 ml  21 08:00  21 09:34





Dexamethasone 8 mg/  NERVRT   80 syringe





Epinephrine HCl 0.4 mg/ Sodium  ASDIRECTED ABEL   Administration





Chloride 33.6 ml   


 


Dexamethasone  Confirm  21 07:36 





  Decadron  Administered  21 07:37 





  Dose  





  4 mg  





  .ROUTE  





  .STK-MED ONE  


 


Fentanyl  50 mcg  21 17:19  21 03:23





  Sublimaze  IV   50 mcg





  Q3H PRN   Administration





  PAIN  


 


Fentanyl  Confirm  21 07:38 





  Sublimaze  Administered  21 07:39 





  Dose  





  250 mcg  





  .ROUTE  





  .STK-MED ONE  


 


Fentanyl  Confirm  21 09:01 





  Sublimaze  Administered  21 09:02 





  Dose  





  250 mcg  





  .ROUTE  





  .STK-MED ONE  


 


Fluconazole  200 mg  21 22:15  21 22:44





  Diflucan  PO   200 mg





  DAILY ABEL   Administration


 


Fluconazole  200 mg  21 21:00 





  Diflucan  PO  





  BEDTIME [facility]  


 


Glycopyrrolate  Confirm  21 07:36 





  Robinul  Administered  21 07:37 





  Dose  





  1 mg  





  .ROUTE  





  .STK-MED ONE  


 


Hydromorphone HCl  0.5 mg  21 15:25  21 15:35





  Dilaudid  IVPUSH  21 15:26  0.5 mg





  ONETIME ONE   Administration


 


Hydromorphone HCl  0 mg  21 10:45  01/10/21 19:34





  Dilaudid Pca 15 Mg In Ns 30 Ml  IV   15 mg





  ASDIRECTED PRN   Administration





  Pain  





  Protocol  


 


Hydroxyzine HCl  75 mg  21 10:44  21 10:57





  Vistaril  IM  21 10:45  75 mg





  ONETIME ONE   Administration


 


Sodium Chloride  1,000 mls @ 500 mls/hr  21 14:45  21 14:49





  Normal Saline  IV   500 mls/hr





  ASDIRECTED ABEL   Administration


 


Sodium Chloride  80 mls @ 3 mls/sec  21 15:30  21 15:40





  Normal Saline  IV  21 23:59  3 mls/sec





  ASDIRECTED ABEL   Administration


 


Cefoxitin Sodium 2 gm/ Sodium  50 mls @ 100 mls/hr  21 16:05  21 

16:16





  Chloride  IV  21 16:34  100 mls/hr





  ONETIME ONE   Administration


 


Linezolid 600 mg/ Premix  300 mls @ 300 mls/hr  21 17:30  21 05:12





  IV   300 mls/hr





  Q12H [facility]   Administration


 


Cefoxitin Sodium 2 gm/ Sodium  50 mls @ 100 mls/hr  21 00:05  21 

07:34





  Chloride  IV   100 mls/hr





  Q8H ABEL   Administration


 


Dextrose/Lactated Ringer's  1,000 mls @ 125 mls/hr  21 17:30  21 

02:01





  Dextrose 5%-Lactated Ringers  IV   125 mls/hr





  ASDIRECTED ABEL   Administration


 


Lactated Ringer's  Confirm  21 08:35 





  Ringers, Lactated  Administered  21 08:36 





  Dose  





  1,000 mls @ as directed  





  .ROUTE  





  .STK-MED ONE  


 


Piperacillin/Tazobactam/  50 mls @ 100 mls/hr  21 11:00  21 04:30





  Dextrose 3.375 gm/ Premix  IV   100 mls/hr





  Q6H ABEL   Administration


 


Dextrose/Lactated Ringer's  1,000 mls @ 50 mls/hr  21 11:30  21 

12:34





  Dextrose 5%-Lactated Ringers  IV   50 mls/hr





  ASDIRECTED ABEL   Administration


 


Lactated Ringer's  1,000 mls @ 150 mls/hr  21 11:30  01/10/21 05:23





  Ringers, Lactated  IV   150 mls/hr





  ASDIRECTED ABEL   Administration


 


Lidocaine HCl  Confirm  21 07:17 





  Xylocaine-Mpf 1%  Administered  21 07:18 





  Dose  





  4 mls @ as directed  





  .ROUTE  





  .STK-MED ONE  


 


Iopamidol  135 ml  21 15:26  21 15:40





  Isovue-300 (61%)  IV  21 15:27  135 ml





  ONETIME ONE   Administration


 


Lidocaine/Epinephrine  Confirm  21 07:30 





  Xylocaine 1% With Epinephrine 1:100,000  Administered  21 07:31 





  Dose  





  50 ml  





  .ROUTE  





  .STK-MED ONE  


 


Linezolid  600 mg  21 09:20  21 09:20





  Zyvox  IRR  21 09:21  600 mg





  .STK-MED ONE   Administration


 


Linezolid  600 mg  21 09:36  21 09:36





  Zyvox  IRR  21 09:37  600 mg





  .STK-MED ONE   Administration


 


Meropenem  Confirm  21 07:30  21 09:20





  Merrem  Administered  21 07:31  500 mg





  Dose   Administration





  500 mg  





  .ROUTE  





  .STK-MED ONE  


 


Meropenem  Confirm  21 09:31  21 09:36





  Merrem  Administered  21 09:32  500 mg





  Dose   Administration





  500 mg  





  .ROUTE  





  .STK-MED ONE  


 


Morphine Sulfate  150 mg  01/10/21 21:30 





  Morphine Pca 150 Mg In 30 Ml  IV  





  ASDIRECTED ABEL  





  Protocol  


 


Neostigmine Methylsulfate  Confirm  21 07:36 





  Neostigmine  Administered  21 07:37 





  Dose  





  5 mg  





  .ROUTE  





  .STK-MED ONE  


 


Nystatin  5 ml  21 22:30  21 20:38





  Mycostatin  PO   Not Given





  Q6H ABEL  


 


Ondansetron HCl  4 mg  21 15:25  21 15:34





  Zofran  IVPUSH  21 15:26  4 mg





  ONETIME ONE   Administration


 


Ondansetron HCl  Confirm  21 07:36 





  Zofran  Administered  21 07:37 





  Dose  





  4 mg  





  .ROUTE  





  .STK-MED ONE  


 


Ondansetron HCl  4 mg  21 10:39  21 10:55





  Zofran  IVPUSH  21 10:40  4 mg





  ONETIME ONE   Administration


 


Pantoprazole Sodium  40 mg  21 17:30  21 17:31





  Protonix Iv***  IV  21 17:31  40 mg





  ONETIME ONE   Administration


 


Propofol  Confirm  21 07:36 





  Diprivan  20 Ml  Administered  21 07:37 





  Dose  





  200 mg  





  .ROUTE  





  .STK-MED ONE  


 


Rocuronium Bromide  Confirm  21 07:36 





  Zemuron  Administered  21 07:37 





  Dose  





  50 mg  





  .ROUTE  





  .STK-MED ONE  


 


Scopolamine  Confirm  21 10:37 





  Transderm-Scop  Administered  21 10:38 





  Dose  





  1.5 mg  





  .ROUTE  





  .STK-MED ONE  


 


Sodium Chloride  10 ml  21 15:26  21 15:40





  Saline Flush  FLUSH   10 ml





  ASDIRECTED PRN   Administration





  Keep Vein Open  


 


Succinylcholine Chloride  Confirm  21 07:36 





  Quelicin  Administered  21 07:37 





  Dose  





  200 mg  





  .ROUTE  





  .STK-MED ONE  


 


Trazodone HCl  100 mg  21 22:59  21 23:10





  Trazodone  PO   100 mg





  BEDTIME PRN   Administration





  Insomnia  














- Re-Assessments/Exams


Free Text/Narrative Re-Assessment/Exam: 





21 15:06


CBC, CRP, CMP was obtained and a culture obtained from the wound.  IV was 

started and she was given normal saline.  Discussed with Dr. August, her surgeon,

 and he recommended a CT of the abdomen and pelvis.  Patient was given 2 g of 

cefoxitin IV, 0.5 mg of IV Dilaudid and 4 mg of IV Zofran.


21 16:45


White count is normal but CRP is now markedly elevated at near 20.  CT the 

abdomen and pelvis with IV contrast has the following result


IMPRESSION:


1. There has reportedly been interval bowel resection with a new healing midline

 anterior abdominal wall wound. Multiple bowel anastomoses are seen. No evidence

 of bowel obstruction. 


2. New 5.4 x 4.8 cm hematoma in the left abdomen which is likely postoperative 

in nature. 


3. New partially encapsulated gas and fluid collection subjacent to the anterior

 abdominal wall measuring approximately 2.4 x 12.3 cm, worrisome for developing 

abscess. 


4. New 4.9 x 2.2 cm peripherally enhancing fluid collection subjacent to the 

left anterior abdominal wall. This has a different, more simple fluid appearance

 than the previously seen ill-defined soft tissue thickening and gas in this 

region on the prior exam. 


5. Stable soft tissue thickening in the left inguinal region adjacent to the 

left anterior aspect of the bladder. This could be related to a prior inguinal 

hernia repair.








Departure





- Departure


Time of Disposition: 19:06


Disposition: Admitted As Inpatient 67


Clinical Impression: 


 Abdominal abscess





Abdominal pain


Qualifiers:


 Abdominal location: lower abdomen, unspecified Qualified Code(s): R10.30 - 

Lower abdominal pain, unspecified








- Discharge Information





Sepsis Event Note (ED)





- Evaluation


Sepsis Screening Result: No Definite Risk

## 2021-01-09 RX ADMIN — LINEZOLID SCH MLS/HR: 600 INJECTION, SOLUTION INTRAVENOUS at 05:46

## 2021-01-09 RX ADMIN — TAZOBACTAM SODIUM AND PIPERACILLIN SODIUM SCH MLS/HR: 375; 3 INJECTION, SOLUTION INTRAVENOUS at 17:00

## 2021-01-09 RX ADMIN — LINEZOLID SCH MLS/HR: 600 INJECTION, SOLUTION INTRAVENOUS at 16:56

## 2021-01-09 RX ADMIN — ONDANSETRON PRN MG: 2 INJECTION, SOLUTION INTRAMUSCULAR; INTRAVENOUS at 13:39

## 2021-01-09 RX ADMIN — NYSTATIN SCH: 500000 SUSPENSION ORAL at 05:43

## 2021-01-09 RX ADMIN — TIOTROPIUM BROMIDE AND OLODATEROL SCH GM: 3.124; 2.736 SPRAY, METERED RESPIRATORY (INHALATION) at 14:40

## 2021-01-09 RX ADMIN — TAZOBACTAM SODIUM AND PIPERACILLIN SODIUM SCH MLS/HR: 375; 3 INJECTION, SOLUTION INTRAVENOUS at 22:29

## 2021-01-09 RX ADMIN — TAZOBACTAM SODIUM AND PIPERACILLIN SODIUM SCH MLS/HR: 375; 3 INJECTION, SOLUTION INTRAVENOUS at 10:57

## 2021-01-09 RX ADMIN — FENTANYL CITRATE PRN MCG: 50 INJECTION, SOLUTION INTRAMUSCULAR; INTRAVENOUS at 03:23

## 2021-01-09 RX ADMIN — NYSTATIN SCH: 500000 SUSPENSION ORAL at 20:38

## 2021-01-09 RX ADMIN — ONDANSETRON PRN MG: 2 INJECTION, SOLUTION INTRAMUSCULAR; INTRAVENOUS at 03:18

## 2021-01-09 RX ADMIN — Medication SCH: at 12:23

## 2021-01-10 RX ADMIN — Medication PRN MG: at 19:34

## 2021-01-10 RX ADMIN — Medication SCH: at 08:41

## 2021-01-10 RX ADMIN — MAGNESIUM SULFATE IN WATER SCH MLS/HR: 40 INJECTION, SOLUTION INTRAVENOUS at 15:16

## 2021-01-10 RX ADMIN — TAZOBACTAM SODIUM AND PIPERACILLIN SODIUM SCH MLS/HR: 375; 3 INJECTION, SOLUTION INTRAVENOUS at 17:00

## 2021-01-10 RX ADMIN — FLUTICASONE PROPIONATE SCH INH: 50 SPRAY, METERED NASAL at 08:39

## 2021-01-10 RX ADMIN — Medication PRN MG: at 08:36

## 2021-01-10 RX ADMIN — TAZOBACTAM SODIUM AND PIPERACILLIN SODIUM SCH MLS/HR: 375; 3 INJECTION, SOLUTION INTRAVENOUS at 11:35

## 2021-01-10 RX ADMIN — TAZOBACTAM SODIUM AND PIPERACILLIN SODIUM SCH MLS/HR: 375; 3 INJECTION, SOLUTION INTRAVENOUS at 04:39

## 2021-01-10 RX ADMIN — TIOTROPIUM BROMIDE AND OLODATEROL SCH GM: 3.124; 2.736 SPRAY, METERED RESPIRATORY (INHALATION) at 07:05

## 2021-01-10 RX ADMIN — ONDANSETRON PRN MG: 2 INJECTION, SOLUTION INTRAMUSCULAR; INTRAVENOUS at 09:37

## 2021-01-10 RX ADMIN — MAGNESIUM SULFATE IN WATER SCH MLS/HR: 40 INJECTION, SOLUTION INTRAVENOUS at 09:36

## 2021-01-10 RX ADMIN — LINEZOLID SCH MLS/HR: 600 INJECTION, SOLUTION INTRAVENOUS at 17:45

## 2021-01-10 RX ADMIN — LINEZOLID SCH MLS/HR: 600 INJECTION, SOLUTION INTRAVENOUS at 05:16

## 2021-01-10 RX ADMIN — MAGNESIUM SULFATE IN WATER SCH MLS/HR: 40 INJECTION, SOLUTION INTRAVENOUS at 20:37

## 2021-01-10 RX ADMIN — TAZOBACTAM SODIUM AND PIPERACILLIN SODIUM SCH MLS/HR: 375; 3 INJECTION, SOLUTION INTRAVENOUS at 22:51

## 2021-01-11 RX ADMIN — Medication SCH: at 08:40

## 2021-01-11 RX ADMIN — FLUTICASONE PROPIONATE SCH INH: 50 SPRAY, METERED NASAL at 08:37

## 2021-01-11 RX ADMIN — LINEZOLID SCH MLS/HR: 600 INJECTION, SOLUTION INTRAVENOUS at 05:12

## 2021-01-11 RX ADMIN — Medication SCH MLS/HR: at 22:07

## 2021-01-11 RX ADMIN — TIOTROPIUM BROMIDE AND OLODATEROL SCH GM: 3.124; 2.736 SPRAY, METERED RESPIRATORY (INHALATION) at 07:17

## 2021-01-11 RX ADMIN — NYSTATIN SCH ML: 500000 SUSPENSION ORAL at 15:26

## 2021-01-11 RX ADMIN — MAGNESIUM SULFATE IN WATER SCH MLS/HR: 40 INJECTION, SOLUTION INTRAVENOUS at 08:35

## 2021-01-11 RX ADMIN — HYDROCODONE BITARTRATE AND ACETAMINOPHEN PRN TAB: 5; 325 TABLET ORAL at 15:26

## 2021-01-11 RX ADMIN — HYDROCODONE BITARTRATE AND ACETAMINOPHEN PRN TAB: 5; 325 TABLET ORAL at 07:28

## 2021-01-11 RX ADMIN — MAGNESIUM SULFATE IN WATER SCH MLS/HR: 40 INJECTION, SOLUTION INTRAVENOUS at 15:05

## 2021-01-11 RX ADMIN — HYDROCODONE BITARTRATE AND ACETAMINOPHEN PRN TAB: 5; 325 TABLET ORAL at 11:24

## 2021-01-11 RX ADMIN — NYSTATIN SCH ML: 500000 SUSPENSION ORAL at 22:06

## 2021-01-11 RX ADMIN — AMPICILLIN SODIUM AND SULBACTAM SODIUM SCH MLS/HR: 2; 1 INJECTION, POWDER, FOR SOLUTION INTRAMUSCULAR; INTRAVENOUS at 22:08

## 2021-01-11 RX ADMIN — MAGNESIUM SULFATE IN WATER SCH MLS/HR: 40 INJECTION, SOLUTION INTRAVENOUS at 20:49

## 2021-01-11 RX ADMIN — HYDROCODONE BITARTRATE AND ACETAMINOPHEN PRN TAB: 5; 325 TABLET ORAL at 20:38

## 2021-01-11 RX ADMIN — AMPICILLIN SODIUM AND SULBACTAM SODIUM SCH MLS/HR: 2; 1 INJECTION, POWDER, FOR SOLUTION INTRAMUSCULAR; INTRAVENOUS at 15:26

## 2021-01-11 RX ADMIN — NYSTATIN SCH ML: 500000 SUSPENSION ORAL at 09:53

## 2021-01-11 RX ADMIN — MAGNESIUM SULFATE IN WATER SCH MLS/HR: 40 INJECTION, SOLUTION INTRAVENOUS at 02:28

## 2021-01-11 RX ADMIN — AMPICILLIN SODIUM AND SULBACTAM SODIUM SCH MLS/HR: 2; 1 INJECTION, POWDER, FOR SOLUTION INTRAMUSCULAR; INTRAVENOUS at 09:53

## 2021-01-11 RX ADMIN — Medication SCH MLS/HR: at 15:06

## 2021-01-11 RX ADMIN — Medication SCH MLS/HR: at 20:03

## 2021-01-11 RX ADMIN — TAZOBACTAM SODIUM AND PIPERACILLIN SODIUM SCH MLS/HR: 375; 3 INJECTION, SOLUTION INTRAVENOUS at 04:30

## 2021-01-11 RX ADMIN — Medication SCH MLS/HR: at 17:19

## 2021-01-11 RX ADMIN — ONDANSETRON PRN MG: 2 INJECTION, SOLUTION INTRAMUSCULAR; INTRAVENOUS at 18:45

## 2021-01-11 NOTE — PN
DATE OF SERVICE:  01/11/2021

 

The patient has been afebrile with stable vital signs.  No major problems were noted

overnight.  She had a small amount of oral intake and has had no nausea with that.  No

flatus or bowel movement as of yet.  Cultures came back in all cases showing enterococcus

sensitive to ampicillin, and given this, we will discontinue the Zosyn and Zyvox and start

her on Unasyn 3 g q.6 h.  At present, she does not have an IV  __________ peripheral IV and

then hopefully a PICC line subsequent to that.  Otherwise, we will need to put a central

line in later today.  Her hemoglobin is 7.7.  We will give her 1 unit of packed RBCs today.

We will move up to a full- liquid diet, continue bowel stimulation.

 

 

 

 

Arian August MD

DD:  01/11/2021 07:04:24

DT:  01/11/2021 09:59:15

Job #:  [id number]

## 2021-01-11 NOTE — PN
DATE OF SERVICE:  01/10/2021

 

The patient has been afebrile with stable vital signs. Oxygenation appears to be

__________. She did have an NG tube in overnight with minimal output.  We will remove that

and she will be able to use her CPAP today. Otherwise, we will start her on oral pain

medications, back down somewhat on the IV rate.  Magnesium is somewhat low, that will be

supplemented, and we will allow her to have some sips of liquids with coffee today.

Otherwise, we will  begin dressing changes to the open wound.  Continue with present

antibiotics pending C and S results.

 

 

 

 

Arian August MD

DD:  01/10/2021 07:10:09

DT:  01/11/2021 08:37:31

Job #:  [id number]

## 2021-01-12 RX ADMIN — AMPICILLIN SODIUM AND SULBACTAM SODIUM SCH MLS/HR: 2; 1 INJECTION, POWDER, FOR SOLUTION INTRAMUSCULAR; INTRAVENOUS at 21:24

## 2021-01-12 RX ADMIN — HYDROCODONE BITARTRATE AND ACETAMINOPHEN PRN TAB: 5; 325 TABLET ORAL at 10:44

## 2021-01-12 RX ADMIN — MAGNESIUM SULFATE IN WATER SCH MLS/HR: 40 INJECTION, SOLUTION INTRAVENOUS at 03:21

## 2021-01-12 RX ADMIN — NYSTATIN SCH ML: 500000 SUSPENSION ORAL at 05:31

## 2021-01-12 RX ADMIN — HYDROCODONE BITARTRATE AND ACETAMINOPHEN PRN TAB: 5; 325 TABLET ORAL at 15:00

## 2021-01-12 RX ADMIN — FLUTICASONE PROPIONATE SCH INH: 50 SPRAY, METERED NASAL at 10:46

## 2021-01-12 RX ADMIN — NYSTATIN SCH ML: 500000 SUSPENSION ORAL at 11:00

## 2021-01-12 RX ADMIN — HYDROCODONE BITARTRATE AND ACETAMINOPHEN PRN TAB: 5; 325 TABLET ORAL at 04:24

## 2021-01-12 RX ADMIN — TIOTROPIUM BROMIDE AND OLODATEROL SCH GM: 3.124; 2.736 SPRAY, METERED RESPIRATORY (INHALATION) at 07:02

## 2021-01-12 RX ADMIN — NYSTATIN SCH ML: 500000 SUSPENSION ORAL at 21:11

## 2021-01-12 RX ADMIN — AMPICILLIN SODIUM AND SULBACTAM SODIUM SCH MLS/HR: 2; 1 INJECTION, POWDER, FOR SOLUTION INTRAMUSCULAR; INTRAVENOUS at 03:23

## 2021-01-12 RX ADMIN — ONDANSETRON PRN MG: 2 INJECTION, SOLUTION INTRAMUSCULAR; INTRAVENOUS at 11:00

## 2021-01-12 RX ADMIN — AMPICILLIN SODIUM AND SULBACTAM SODIUM SCH MLS/HR: 2; 1 INJECTION, POWDER, FOR SOLUTION INTRAMUSCULAR; INTRAVENOUS at 10:43

## 2021-01-12 RX ADMIN — NYSTATIN SCH ML: 500000 SUSPENSION ORAL at 16:44

## 2021-01-12 RX ADMIN — Medication SCH EACH: at 10:55

## 2021-01-12 RX ADMIN — Medication PRN TAB: at 21:16

## 2021-01-12 RX ADMIN — HYDROCODONE BITARTRATE AND ACETAMINOPHEN PRN TAB: 5; 325 TABLET ORAL at 19:34

## 2021-01-12 RX ADMIN — AMPICILLIN SODIUM AND SULBACTAM SODIUM SCH MLS/HR: 2; 1 INJECTION, POWDER, FOR SOLUTION INTRAMUSCULAR; INTRAVENOUS at 16:40

## 2021-01-13 RX ADMIN — AMPICILLIN SODIUM AND SULBACTAM SODIUM SCH MLS/HR: 2; 1 INJECTION, POWDER, FOR SOLUTION INTRAMUSCULAR; INTRAVENOUS at 21:03

## 2021-01-13 RX ADMIN — HYDROCODONE BITARTRATE AND ACETAMINOPHEN PRN TAB: 5; 325 TABLET ORAL at 14:39

## 2021-01-13 RX ADMIN — NYSTATIN SCH ML: 500000 SUSPENSION ORAL at 10:42

## 2021-01-13 RX ADMIN — ONDANSETRON PRN MG: 2 INJECTION, SOLUTION INTRAMUSCULAR; INTRAVENOUS at 09:50

## 2021-01-13 RX ADMIN — Medication PRN TAB: at 21:15

## 2021-01-13 RX ADMIN — AMPICILLIN SODIUM AND SULBACTAM SODIUM SCH MLS/HR: 2; 1 INJECTION, POWDER, FOR SOLUTION INTRAMUSCULAR; INTRAVENOUS at 16:42

## 2021-01-13 RX ADMIN — NYSTATIN SCH ML: 500000 SUSPENSION ORAL at 15:20

## 2021-01-13 RX ADMIN — TIOTROPIUM BROMIDE AND OLODATEROL SCH GM: 3.124; 2.736 SPRAY, METERED RESPIRATORY (INHALATION) at 07:06

## 2021-01-13 RX ADMIN — HYDROCODONE BITARTRATE AND ACETAMINOPHEN PRN TAB: 5; 325 TABLET ORAL at 09:50

## 2021-01-13 RX ADMIN — FLUTICASONE PROPIONATE SCH: 50 SPRAY, METERED NASAL at 08:21

## 2021-01-13 RX ADMIN — NYSTATIN SCH ML: 500000 SUSPENSION ORAL at 21:01

## 2021-01-13 RX ADMIN — ONDANSETRON PRN MG: 2 INJECTION, SOLUTION INTRAMUSCULAR; INTRAVENOUS at 23:01

## 2021-01-13 RX ADMIN — NYSTATIN SCH ML: 500000 SUSPENSION ORAL at 05:58

## 2021-01-13 RX ADMIN — AMPICILLIN SODIUM AND SULBACTAM SODIUM SCH MLS/HR: 2; 1 INJECTION, POWDER, FOR SOLUTION INTRAMUSCULAR; INTRAVENOUS at 04:12

## 2021-01-13 RX ADMIN — Medication SCH: at 08:22

## 2021-01-13 RX ADMIN — AMPICILLIN SODIUM AND SULBACTAM SODIUM SCH MLS/HR: 2; 1 INJECTION, POWDER, FOR SOLUTION INTRAMUSCULAR; INTRAVENOUS at 10:38

## 2021-01-13 RX ADMIN — HYDROCODONE BITARTRATE AND ACETAMINOPHEN PRN TAB: 5; 325 TABLET ORAL at 05:57

## 2021-01-13 RX ADMIN — HYDROCODONE BITARTRATE AND ACETAMINOPHEN PRN TAB: 5; 325 TABLET ORAL at 22:47

## 2021-01-13 NOTE — PN
DATE OF SERVICE:  01/13/2021

 

SUBJECTIVE:  Bear's vital signs have been stable.  Her pain has been well managed.  Oral

intake 2616.  Urine output has not been recorded.

 

REVIEW OF SYSTEMS:  Remainder of review of systems negative for any pertinent positives and

negatives.

 

OBJECTIVE:  GENERAL:  Bear is a pleasant 65-year-old female.

VITAL SIGNS:  She has had a temperature max of 100.2.  Current TPR is 99.2, 74, 18, blood

pressure 104/47.

HEENT:  Negative.

NECK:  Supple.

HEART:  Regular rate and rhythm.

LUNGS:  Clear.

ABDOMEN:  Dressing dry and intact.  Abdominal binder is on.

EXTREMITIES:  Without peripheral edema.

 

ASSESSMENT:  Exploratory laparotomy with incision and drainage of abdominal wall abscess,

removal of intraabdominal mass, and excision of necrotic intraabdominal wall tissue for

intraabdominal wall abscess.  Date of procedure:  01/09/2021.  Surgeon:  Arian August MD.

 

PLAN:

1. Consult with discharge planning for home health care.  Will need dressing changes twice

    daily, which includes removing 4 x 4 gauze pads, gauze pieces, and covering incision

    with ABDs.  One dressing change should be after a shower.

2. Check CBC, CMP, mag, phos, and BNP in a.m.

3. Saline lock IV.

4. Lasix 20 mg IV now, and second dose of Lasix 20 mg IV at 1400.

5. We will evaluate p.r.n. or in a.m.

6. Plan discharge in a.m.

 

 

 

 

Mariel Lugo PA-C

DD:  01/13/2021 08:42:19

DT:  01/13/2021 08:56:32

Job #:  [id number]

## 2021-01-14 VITALS — HEART RATE: 84 BPM | DIASTOLIC BLOOD PRESSURE: 69 MMHG | SYSTOLIC BLOOD PRESSURE: 130 MMHG

## 2021-01-14 RX ADMIN — NYSTATIN SCH ML: 500000 SUSPENSION ORAL at 06:05

## 2021-01-14 RX ADMIN — Medication SCH: at 09:18

## 2021-01-14 RX ADMIN — AMPICILLIN SODIUM AND SULBACTAM SODIUM SCH MLS/HR: 2; 1 INJECTION, POWDER, FOR SOLUTION INTRAMUSCULAR; INTRAVENOUS at 04:12

## 2021-01-14 RX ADMIN — TIOTROPIUM BROMIDE AND OLODATEROL SCH GM: 3.124; 2.736 SPRAY, METERED RESPIRATORY (INHALATION) at 07:22

## 2021-01-14 RX ADMIN — HYDROCODONE BITARTRATE AND ACETAMINOPHEN PRN TAB: 5; 325 TABLET ORAL at 09:14

## 2021-01-14 RX ADMIN — ONDANSETRON PRN MG: 2 INJECTION, SOLUTION INTRAMUSCULAR; INTRAVENOUS at 10:23

## 2021-01-14 RX ADMIN — FLUTICASONE PROPIONATE SCH: 50 SPRAY, METERED NASAL at 09:17

## 2021-01-14 RX ADMIN — NYSTATIN SCH ML: 500000 SUSPENSION ORAL at 09:18

## 2021-01-14 NOTE — DISCH
ADMISSION DIAGNOSES:  Abdominal abscess, status post exploratory laparotomy with lysis of

adhesions, small bowel resection, removal of peritoneal mass, small-bowel stricture plasty,

drainage of intraabdominal abscess, and placement of Interceed mesh.  Date of procedure:

12/31/2020, and delayed primary closure on 01/02/2021.

 

DISCHARGE DIAGNOSES:  Exploratory laparotomy with incision and drainage of abdominal wall

abscess, removal of intraabdominal mass, and excision of necrotic intraabdominal wall tissue

for intraabdominal abscess.  Date of procedure:  01/09/2021.  Surgeon:  Arian August MD.

 

HISTORY:  Bear Grey had an exploratory laparotomy and was discharged on 01/06/2021.  She

developed a fever the day after being discharged and presented back to the emergency room

with 101 fever.  She was admitted to the hospital, had the above surgical procedure.  She

had no complications.  She was started on appropriate antibiotics.  The abdominal culture

grew out gram-positive rods, Enterococcus faecalis, Klebsiella pneumoniae, Escherichia coli,

and gram-positive rods.  Throughout her hospitalization, she remained afebrile.  Oral intake

and output were adequate.  She was having bowel movements.  Activity was good.  She was

independent in daily care.  She was taught how to change, pack her open abdominal wound, and

she was able to be discharged to home on 01/14/2021.

 

PHYSICAL EXAMINATION:

GENERAL:  Bear Grey is a pleasant 65-year-old female.

VITAL SIGNS:  TPR is 98.8, 84, 18, blood pressure 130/69.

HEENT:  Negative.

NECK:  Supple.

HEART:  Regular rate and rhythm.

LUNGS:  Clear.

ABDOMEN:  Incision has been packed.  Dressing dry and intact.  Abdominal binder is on.

EXTREMITIES:  Without peripheral edema.

 

DISPOSITION:  Discharged to home.

 

CONDITION:  Stable and improving.

 

HOME MEDICATIONS:  Augmentin 875 mg/125 mg 1 tablet p.o. q.12 hours for 5 days.  She is to

resume her home medication as she was on prior to admission.  Trazodone 50 to 100 mg at

bedtime p.r.n., Zanaflex 4 mg p.o. every 8 hours p.r.n., Zofran 8 mg p.o. q.8 hours p.r.n.,

Chantix 1 mg p.o. b.i.d., Ellipta inhaler 1 daily, sertraline 150 mg p.o. daily, Denavir 1%

cream 1 applicator topical daily, Protonix 40 mg daily, nystatin 5 mL q.6 hours swish and

swallow, Elocon 0.1% cream 1 applicator topical daily, meclizine 25 mg every morning,

lidocaine 2% jelly 1 applicator topical daily, Synthroid 25 mcg p.o. at breakfast,

homeopathic product for leg cramps 1 to 2 at bedtime, Neurontin 300 mg p.o. bedtime,

furosemide 40 mg p.o. daily p.r.n., Flonase 2 sprays in each nostril p.r.n. once a day,

Diflucan 200 mg p.o. daily, Colace 100 mg p.o. b.i.d., Voltaren 1% gel 1 applicator topical

q.i.d., vitamin B12 500 mcg p.o. daily, Plavix 75 mg p.o. daily, Klonopin 0.5 mg p.o. daily

p.r.n., vitamin D3 2000 International Units p.o. daily, Zyrtec 10 mg p.o. daily, aspirin 81

mg p.o. daily, Ventolin 2 puffs inhalation every 6 hours p.r.n., albuterol inhaler 3 mL

nebulized every 4 hours p.r.n., Norco 5/325 mg 1 to 2 tablets every 4 hours p.r.n. pain.

 

FOLLOWUP:  Followup appointment with Arian August MD, on 01/20/2021 at 10 a.m.

 

SPECIAL INSTRUCTION:  Dressing change twice a day, 1 dressing change after shower, remove

packing and replace 4 x 4 gauze into open incision and cover with ABDs.

 

DIET:  Usual diet as tolerated.  Drink 8 to 10 glasses of water a day.

 

ACTIVITY:  No lifting greater than 10 pounds for 6 weeks.  Driving:  Do not drive for 1 week

and while on narcotic pain medication.  Shower/bathing:  May shower.

 

DISCHARGE INSTRUCTIONS:  Keep operative site clean and dry.  Notify provider if any fever,

increased pain, swelling, redness, drainage, nausea, or vomiting, and use incentive

spirometer 10 times every hour while awake.

 

DD:  01/14/2021 16:10:03

DT:  01/14/2021 16:34:02

Job #:  [id number]

## 2021-01-15 NOTE — OR
DATE OF PROCEDURE:  12/31/2020

 

SURGEON:  Arian August MD

 

PREOPERATIVE DIAGNOSIS:  Perforated sigmoid colon diverticulitis.

 

POSTOPERATIVE DIAGNOSES:

1. Small bowel adherent to the left lower quadrant intraperitoneal mesh with small bowel

    fistula and intraabdominal abscess formation.

2. Extensive intraabdominal adhesions.

3. Uncomplicated sigmoid colon diverticulosis.

 

OPERATIVE PROCEDURES:  Exploratory laparotomy with lysis of extensive adhesions:

1. Small bowel resection (35713).

2. Removal of intraperitoneal mesh (31481).

3. Small bowel strictureplasty (29420).

4. Drainage of intraabdominal abscess (95879).

5. Placement of Interceed mesh to displace pelvic and abdominal wall from underlying

    viscera to limit recurrent adhesion formation (69282).

 

ANESTHESIA:  General.

 

ASSISTANT:  Mariel Lugo PA-C

 

INDICATIONS FOR PROCEDURE:  This is a 65-year-old female who was thought to have persistent

complicated sigmoid colon diverticulitis.  Recently had an attack of left lower quadrant

pain, was diagnosed with diverticulitis in June and then more recently has had recurrent

episodes.  After the patient was off antibiotics for only 1 day, she had recurrent symptoms,

and followup CT scan showed extensive diverticular disease with there being air and abscess

formation in that vicinity.  This was adjacent to some intraperitoneal mesh located in that

area, which may be involved as well.  The plan is to proceed with exploratory laparotomy

with procedures as indicated, initially thought to be sigmoid resection.  The potential

risks including bleeding, infection, leaks from various GI tract closures as well as

possible incorrect colostomy formation were gone over with the remote possibility of

cardiopulmonary, septic, or hemorrhagic complications leading to death, and the patient

wishes to proceed.

 

DETAILS OF PROCEDURE:  The patient was taken to the operating room and after general

endotracheal anesthesia was induced, placed in a lithotomy position and a Pablo catheter

inserted, and the abdomen was then prepped and draped.  Eventually, a nasogastric tube was

also then placed.

 

A lower midline incision was then made from the umbilicus down to the pubis.  This was

carried down through the skin and subcutaneous tissue.  __________ expectantly, the patient

had quite a bit in way of adhesions between the omentum, small bowel, and anterior abdominal

wall.  As one proceeded dissecting over towards the left lower quadrant, it became evident

that we were dealing with a complicated small bowel issue rather than colonic

diverticulitis.  The small bowel was narrowed up to the mesh located in that area, and on

subsequent dissection, there was obvious fistula formation from the small bowel into the

mesh with associated adjacent abscess.  The small bowel was eventually freed up such that

the proximal and distal end of the small bowel adherent to the mesh were identified.  These

were divided with a MARY stapler as was the underlying mesentery, and the mesh then removed

from the abdominal wall attached to the small bowel.  During the course of dissection, the

abscess cavity was identified and drained, and cultures were obtained.  The mesh was then

removed from the abdominal wall.  There was 1 additional loop of small bowel that was

adherent to the mesh, and this was divided more or less flush with the mesh, but this did

result in an area of small bowel stricturing at that location.

 

At this point, small bowel strictureplasty was initially undertaken with division of the

aforementioned narrowed area of small bowel opening around the antimesenteric border, and a

60 mm followed by 30 mm MARY stapler was then used with internal firing, and the common

opening then closed with a MARY purple load.  The angles were anastomosed and reinforced with

some 3-0 Vicryl stitch.  There was no mesenteric defect in this case.

 

Small bowel continuity was then accomplished with a side-to-side enteroenterostomy between

the 2 limbs of the revised small bowel with the same sequence of staples as for the

strictureplasty.  The angles were anastomosed.  In this case, mesenteric defect was

approximated with 3-0 Vicryl stitch as well.

 

At this point, the abdomen was irrigated with antibiotic-containing saline solution.  There

was some additional mesh in the upper abdomen, but this appeared to be away from the present

infection and was encapsulated by omentum and it was left in place knowing there was some

possibility that __________ being infected, but there was also the chance that by its

removal, she would certainly have a recurrent hernia in that area.  2 Moises-Gibson drains

were then placed in the mid abdomen, taken down towards the areas of dissection and abscess

formation.  The Interceed mesh was then placed underneath the incision, from there down

towards the pelvis to limit recurrent adhesion formation between the viscera and those

surfaces.  Midline fascia was then approximated with #2 Vicryl stitch.  Skin and

subcutaneous tissue were left open for a planned delayed primary closure in 48 hours.  Prior

to closure, bilateral transversus abdominis plane blocks had also been placed, and the

patient was taken to the recovery room in satisfactory condition.  There were no evident

complications.

 

Physician assistant, Mariel Lugo PA-C, played an essential role in assisting in this case,

helping to position the patient, and retracting structures as needed as well as suturing and

cutting sutures when indicated.  Her presence improved patient safety and decreased

operative time.

 

 

 

 

Arian August MD

DD:  01/15/2021 07:04:55

DT:  01/15/2021 11:52:25

Job #:  [id number]

## 2021-01-19 NOTE — OR
DATE OF PROCEDURE:  01/09/2021

 

SURGEON:  Arian August MD

 

PREOPERATIVE DIAGNOSIS:  Infected intraabdominal hematoma.

 

POSTOPERATIVE DIAGNOSES:

1. Infected intraabdominal hematoma.

2. Potentially infected intraperitoneal mesh with adherence to left lobe of liver.

3. Recurrent incarcerated incisional hernia.

4. Focal abdominal wall necrosis.

 

OPERATIVE PROCEDURE:  Exploratory laparotomy with lysis of adhesions and:

1. Drainage of infected intraabdominal hematoma (48900).

2. Removal of intraperitoneal mesh (67267).

3. Partial left hepatic lobectomy at the point of liver adherence to mesh (58860).

4. Repair of recurrent incarcerated incisional hernia (04457).

5. Debridement of focal abdominal wall necrosis (57990).

 

ANESTHESIA:  General.

 

INDICATIONS FOR PROCEDURE:  This is a 65-year-old presenting status post recent complicated

small bowel resection in which small bowel was crystalized into some left lower quadrant

abdominal mesh.  Initially, the patient did well.  The patient now presents with what

appears to be an infected hematoma in the left midabdomen.  Plan is to proceed with

exploratory laparotomy with drainage of hematoma and other procedures as indicated.  The

potential risks including bleeding, infection, injury to underlying viscera as well as

possibility of cardiopulmonary, septic, or hemorrhagic complications leading to death were

discussed, and the patient wishes to proceed.

 

DETAILS OF PROCEDURE:  The patient was taken to the operating room and placed in the supine

position. After general endotracheal anesthesia was induced, a Pablo catheter was inserted,

and the abdomen prepped and draped.  The previous staple line in the lower abdomen midline

was then taken down and the subcutaneous tissue and fascial tissue were divided.  As we

entered  the peritoneal cavity to the left of the midline, as expected, there was purulent

collection of hematoma.  Cultures of this were obtained, Gram stains showing gram-positive

cocci. This area was then evacuated and initially irrigated with meropenem-containing saline

solution.  No underlying visceral injury appeared to be present at this point.  The

patient's remaining mesh in the area superior to the incision was at this point felt to be

at high risk for infection and given this the incision was extended superiorly and that mesh

then gradually dissected free from surrounding soft tissues.  This involved some division of

the mesh away from the transverse colon as well as small bowel which was done with the aid

of staples and blunt electrocautery dissection.  On the superior aspect of the mesh, there

was a long ridge of adherent left lobe of liver and this needed to be resected with staples

removing an extensive area of liver adherence to the mesh.

 

At this point, the mesh and the attached liver were removed from the peritoneal cavity.  The

patient at this point obviously had a significant recurrent incarcerated incisional hernia.

Some of this was above the previous mesh and contained some incarcerated transverse colon

and omentum, the latter which was reduced away from the area of herniation.  Finally, there

was some focal necrosis involving the abdominal wall at the skin, subcutaneous tissue, and

fascia level.  This was debrided.  The abdomen was then irrigated with copious amounts of

saline. A total of 4 Moises-Gibson drains were placed across the various portions of the

abdomen and pelvis.  The midline fascia was then approximated with #2 Vicryl stitch.  Prior

to closure, bilateral

transversus abdominis plane blocks were then placed and the drains were affixed with some 3-

0 Vicryl stitch.  The skin and subcutaneous tissue were packed open with Iodoform gauze ,

this was in case there was a need for simple secondary closure given the extent of the

contamination and poor quality of the tissues in the midline fascia.

 

 

 

 

Arian August MD

DD:  01/17/2021 18:36:48

DT:  01/18/2021 10:29:42

Job #:  [id number]

## 2021-02-07 ENCOUNTER — HOSPITAL ENCOUNTER (EMERGENCY)
Dept: HOSPITAL 11 - JP.ED | Age: 66
Discharge: HOME | End: 2021-02-07
Payer: MEDICARE

## 2021-02-07 VITALS — HEART RATE: 89 BPM | DIASTOLIC BLOOD PRESSURE: 57 MMHG | SYSTOLIC BLOOD PRESSURE: 110 MMHG

## 2021-02-07 DIAGNOSIS — Z79.02: ICD-10-CM

## 2021-02-07 DIAGNOSIS — Z88.1: ICD-10-CM

## 2021-02-07 DIAGNOSIS — K21.9: ICD-10-CM

## 2021-02-07 DIAGNOSIS — Z79.82: ICD-10-CM

## 2021-02-07 DIAGNOSIS — E66.9: ICD-10-CM

## 2021-02-07 DIAGNOSIS — Z88.8: ICD-10-CM

## 2021-02-07 DIAGNOSIS — J44.9: ICD-10-CM

## 2021-02-07 DIAGNOSIS — Z87.891: ICD-10-CM

## 2021-02-07 DIAGNOSIS — Z79.899: ICD-10-CM

## 2021-02-07 DIAGNOSIS — E03.9: ICD-10-CM

## 2021-02-07 DIAGNOSIS — Z88.5: ICD-10-CM

## 2021-02-07 DIAGNOSIS — M19.90: Primary | ICD-10-CM

## 2021-02-07 PROCEDURE — 96372 THER/PROPH/DIAG INJ SC/IM: CPT

## 2021-02-07 PROCEDURE — 80048 BASIC METABOLIC PNL TOTAL CA: CPT

## 2021-02-07 PROCEDURE — 99283 EMERGENCY DEPT VISIT LOW MDM: CPT

## 2021-02-07 PROCEDURE — 85025 COMPLETE CBC W/AUTO DIFF WBC: CPT

## 2021-02-07 PROCEDURE — 84550 ASSAY OF BLOOD/URIC ACID: CPT

## 2021-02-07 PROCEDURE — 36415 COLL VENOUS BLD VENIPUNCTURE: CPT

## 2021-02-07 PROCEDURE — 73140 X-RAY EXAM OF FINGER(S): CPT

## 2021-02-07 NOTE — EDM.PDOC
ED HPI GENERAL MEDICAL PROBLEM





- General


Chief Complaint: Upper Extremity Injury/Pain


Stated Complaint: INJURED THUMB


Time Seen by Provider: 21 12:19


Source of Information: Reports: Patient, RN Notes Reviewed


History Limitations: Reports: No Limitations





- History of Present Illness


INITIAL COMMENTS - FREE TEXT/NARRATIVE: 





65-year-old female presents emergency department with a complaint of left thumb 

pain, she denies any trauma denies any repetitive motion, she states the pain 

started last night and has progressively gotten worse she has had some swelling 

around this joint there are no breaks in the skin she denies any history of gout

did take tramadol which provided some relief





- Related Data


                                    Allergies











Allergy/AdvReac Type Severity Reaction Status Date / Time


 


ciprofloxacin Allergy  Hives Verified 21 12:03


 


clonidine Allergy  Other Verified 21 12:03


 


hydromorphone Allergy  Rash Verified 21 12:03


 


oxycodone [From Percocet] Allergy  Hives Verified 21 12:03











Home Meds: 


                                    Home Meds





Levothyroxine Sodium [Synthroid] 25 mcg PO .ACBREAKFAST 01/29/15 [History]


ondansetron HCL [Zofran] 8 mg PO Q8H PRN 05/21/15 [History]


Furosemide 40 mg PO DAILY PRN 17 [History]


Sertraline HCl [Zoloft] 150 mg PO DAILY 17 [History]


traZODone 50 - 100 mg PO BEDTIME PRN 17 [History]


ClonazePAM [KlonoPIN] 0.5 mg PO DAILY PRN 17 [History]


Penciclovir [Denavir 1% Crm] 1 applic TOP ASDIRECTED 17 [History]


Mometasone Furoate [Elocon 0.1% Crm] 1 applic TOP DAILY 18 [History]


tiZANidine [Zanaflex] 4 mg PO Q8H PRN 18 [History]


Pantoprazole [ProTONIX***] 40 mg PO DAILY 19 [History]


Albuterol [Proventil Neb Soln] 3 ml NEB Q4H PRN 20 [History]


Cetirizine [ZyrTEC] 10 mg PO DAILY 20 [History]


Cholecalciferol (Vitamin D3) [Vitamin D3] 2,000 unit PO DAILY 20 [History]


Cyanocobalamin (Vitamin B-12) [B-12] 500 mcg PO DAILY 20 [History]


Gabapentin [Neurontin] 300 mg PO BEDTIME 20 [History]


Lidocaine 2% [Xylocaine 2% Jelly] 1 applic TOP DAILY 20 [History]


Meclizine [Antivert] 25 mg PO .MORNING 20 [History]


Varenicline [Chantix] 1 mg PO BID 20 [History]


Albuterol [Ventolin HFA] 2 puff INH Q6H PRN 20 [History]


Fluticasone Propionate [Flonase] 2 spr NASBOTH DAILY PRN 20 [History]


Homeopathic Products (Leg Cramp Complex Or) 1 - 2 tab PO BEDTIME 20 

[History]


Umeclidinium Brm/Vilanterol Tr [Anoro Ellipta 62.5-25 MCG] 1 each INH DAILY 

20 [History]


Aspirin [Halfprin] 81 mg PO DAILY 12/15/20 [History]


Clopidogrel [Plavix] 75 mg PO DAILY 20 [History]


Diclofenac Sodium [Voltaren 1% Gel] 1 applic TOP QID 20 [History]


Acetaminophen/HYDROcodone [Norco 325-5 MG] 1 - 2 tab PO Q4H PRN #42 tablet 

21 [Rx]


Docusate Sodium [Colace] 100 mg PO BID #60 cap 21 [Rx]


Fluconazole [Diflucan] 200 mg PO DAILY #5 tablet 21 [Rx]


Nystatin [Mycostatin] 5 ml PO Q6H #200 ml 21 [Rx]


traMADol [Ultram] 50 mg PO Q4HR PRN 21 [History]











Past Medical History


HEENT History: Reports: Allergic Rhinitis, Impaired Vision


Other HEENT History: wears glasses


Cardiovascular History: Reports: Cardiomyopathy, Heart Murmur, SOB on Exertion


Respiratory History: Reports: Bronchitis, Recurrent, COPD, Pneumonia, Recurrent,

 Sleep Apnea, SOB


Gastrointestinal History: Reports: Bowel Obstruction, Cholelithiasis, Chronic 

Diarrhea, Diverticulosis, GERD, Hiatal Hernia


Genitourinary History: Reports: UTI, Recurrent, Other (See Below)


Other Genitourinary History: bladder suspension


OB/GYN History: Reports: Pregnancy, Spontaneous 


Musculoskeletal History: Reports: Arthritis, Back Pain, Chronic, Fibromyalgia, 

Other (See Below)


Other Musculoskeletal History: right knee pain.  ; wound ulcer right lower leg


Neurological History: Reports: Vertigo


Psychiatric History: Reports: Anxiety, Depression, Suicidal Ideation


Endocrine/Metabolic History: Reports: Hypothyroidism, Obesity/BMI 30+


Hematologic History: Reports: None


Immunologic History: Reports: None


Oncologic (Cancer) History: Reports: Cervix, Uterine


Other Oncologic History: complete hysterectomy 2015


Dermatologic History: Reports: Cellulitis, Other (See Below)


Other Dermatologic History: sore on right lower leg that won't go away; [facility] [place] debrid 2020





- Infectious Disease History


Infectious Disease History: Reports: Chicken Pox, MRSA, Other (See Below)


Other Infectious Disease History: MRSA in right leg in 2019





- Past Surgical History


Head Surgeries/Procedures: Reports: None


HEENT Surgical History: Reports: Other (See Below)


Other HEENT Surgeries/Procedures: biopsy on tongue


Cardiovascular Surgical History: Reports: Varicose, Other (See Below)


Other Cardiovascular Surgeries/Procedures: RFA leg veins


Respiratory Surgical History: Reports: None


GI Surgical History: Reports: Appendectomy, Cholecystectomy, Colon, Colonoscopy,

 EGD, Hernia Repair/Other


Female  Surgical History: Reports: Hysterectomy, Salpingo-Oophorectomy, Other 

(See Below)


Other Female  Surgeries/Procedures: bladder suspension


Endocrine Surgical History: Reports: None


Neurological Surgical History: Reports: None


Musculoskeletal Surgical History: Reports: None, Knee Replacement


Other Musculoskeletal Surgeries/Procedures:: right partial knee 6/15/20


Oncologic Surgical History: Reports: None


Other Oncologic Surgeries/Procedures: hysterectomy


Dermatological Surgical History: Reports: None





Social & Family History





- Family History


Family Medical History: No Pertinent Family History


Cardiac: Reports: Arrhythmia, Pacemaker, Other (See Below)


Other Cardiac Family History: pacer/defib; valve replacement


Respiratory: Reports: COPD


Musculoskeletal: Reports: Arthritis, Back pain, Chronic, Gout


Endocrine/Metabolic: Reports: Diabetes, type II


Oncologic: Reports: Breast, Cervix, Uterine





- Tobacco Use


Tobacco Use Status *Q: Former Tobacco User


Used Tobacco, but Quit: Yes


Month/Year Tobacco Last Used: 2021


Second Hand Smoke Exposure: No





- Caffeine Use


Caffeine Use: Reports: Coffee, Soda


Caffeine Use Comment: 2 cups coffee.  2-6 diet cokes daily





- Recreational Drug Use


Recreational Drug Use: No





- Living Situation & Occupation


Living situation: Reports:  (Lives with her 80+ mother, and her sister 

is in the next house. Has 1 son and 2 grandchildren , who she is currently 

estranged from her child and grandchildren.)





Review of Systems





- Review of Systems


Review Of Systems: See Below


Musculoskeletal: Reports: Hand Pain


Skin: Reports: No Symptoms


Neurological: Reports: No Symptoms





ED EXAM, GENERAL





- Physical Exam


Exam: See Below


Free Text/Narrative:: 





Examination of the left hand I do appreciate some edema around the first 

metacarpal it is very tender to the touch slightly warm to the touch, she has 

limited range of motion radial pulses +2


Exam Limited By: No Limitations


General Appearance: Alert, WD/WN, No Apparent Distress





Course





- Vital Signs


Last Recorded V/S: 


                                Last Vital Signs











Temp  97.9 F   21 12:14


 


Pulse  89   21 12:14


 


Resp  16   21 12:14


 


BP  110/57 L  21 12:14


 


Pulse Ox  92 L  21 12:14














- Orders/Labs/Meds


Orders: 


                               Active Orders 24 hr











 Category Date Time Status


 


 Fingers Thumb Lt FA [CR] Stat Exams  21 12:24 Taken











Labs: 


                                Laboratory Tests











  21 Range/Units





  13:30 13:30 13:30 


 


WBC   9.4   (4.5-11.0)  K/uL


 


RBC   3.22 L   (3.30-5.50)  M/uL


 


Hgb   9.0 L   (12.0-15.0)  g/dL


 


Hct   28.6 L   (36.0-48.0)  %


 


MCV   89   (80-98)  fL


 


MCH   28   (27-31)  pg


 


MCHC   32   (32-36)  %


 


Plt Count   383   (150-400)  K/uL


 


Neut % (Auto)   71 H   (36-66)  %


 


Lymph % (Auto)   18 L   (24-44)  %


 


Mono % (Auto)   7 H   (2-6)  %


 


Eos % (Auto)   4   (2-4)  %


 


Baso % (Auto)   0   (0-1)  %


 


Sodium    139 L  (140-148)  mmol/L


 


Potassium    4.0  (3.6-5.2)  mmol/L


 


Chloride    102  (100-108)  mmol/L


 


Carbon Dioxide    26  (21-32)  mmol/L


 


Anion Gap    15.0 H  (5.0-14.0)  mmol/L


 


BUN    13  D  (7-18)  mg/dL


 


Creatinine    0.8  (0.6-1.0)  mg/dL


 


Est Cr Clr Drug Dosing    56.19  mL/min


 


Estimated GFR (MDRD)    > 60  (>60)  


 


Glucose    93  ()  mg/dL


 


Uric Acid  5.0    (2.6-6.2)  mg/dL


 


Calcium    8.6  (8.5-10.1)  mg/dL











Meds: 


Medications














Discontinued Medications














Generic Name Dose Route Start Last Admin





  Trade Name Freq  PRN Reason Stop Dose Admin


 


Ketorolac Tromethamine  30 mg  21 12:24  21 12:59





  Toradol  IM  21 12:25  30 mg





  ONETIME ONE   Administration














Departure





- Departure


Time of Disposition: 14:08


Disposition: Home, Self-Care 01


Condition: Fair


Clinical Impression: 


 Inflammatory arthritis








- Discharge Information


Instructions:  Arthritis, Easy-to-Read


Referrals: 


Gabriel Schmidt MD [Primary Care Provider] - 


Forms:  ED Department Discharge


Additional Instructions: 


Try the prednisone 20 mg once a day for the next 3 days follow-up with your 

primary care after the course of prednisone if no improvement call return to the

emergency department worsening of symptoms,





Sepsis Event Note (ED)





- Evaluation


Sepsis Screening Result: No Definite Risk





- Focused Exam


Vital Signs: 


                                   Vital Signs











  Temp Pulse Resp BP Pulse Ox


 


 21 12:14  97.9 F  89  16  110/57 L  92 L


 


 21 12:09  97.9 F  89  16  110/57 L  92 L














- My Orders


Last 24 Hours: 


My Active Orders





21 12:24


Fingers Thumb Lt FA [CR] Stat 














- Assessment/Plan


Last 24 Hours: 


My Active Orders





21 12:24


Fingers Thumb Lt FA [CR] Stat 











Plan: 





Assessment





Acuity = acute





Site and laterality = inflammatory arthritis right thumb





Etiology  = unknown





Manifestations = pain





Location of injury =  Home





Lab values = CBC unremarkable except for hemoglobin low at 9.0 consistent with 

normochromic anemia, BMP unremarkable uric acid normal x-rays of the thumb shows

 no acute process





Plan


Elected to treat empirically with prednisone 2 mg once a day for 3 days follow-

up primary care 3 to 5 days if no improvement

















 This note was dictated using dragon voice recognition software please call with

 any questions on syntax or grammar.

## 2021-02-08 NOTE — CR
Fingers Thumb Lt FA

 

CLINICAL HISTORY: Persistent pain, no trauma

 

FINDINGS: There is no fracture or osseous lesion. There are severe degenerative

changes in the first carpometacarpal joint with deformity of both the proximal

metacarpal and trapezium. There is some periarticular spurring and calcification

or ossification.

 

IMPRESSION: Advanced degenerative changes of the first carpometacarpal joint.

 

In a patient with persistent pain, a trial of intra-articular steroid injection

as a consideration

## 2021-04-06 ENCOUNTER — HOSPITAL ENCOUNTER (EMERGENCY)
Dept: HOSPITAL 11 - JP.ED | Age: 66
Discharge: HOME | End: 2021-04-06
Payer: MEDICARE

## 2021-04-06 VITALS — HEART RATE: 56 BPM | DIASTOLIC BLOOD PRESSURE: 68 MMHG | SYSTOLIC BLOOD PRESSURE: 140 MMHG

## 2021-04-06 DIAGNOSIS — Z72.0: ICD-10-CM

## 2021-04-06 DIAGNOSIS — E66.9: ICD-10-CM

## 2021-04-06 DIAGNOSIS — J44.9: ICD-10-CM

## 2021-04-06 DIAGNOSIS — Z88.5: ICD-10-CM

## 2021-04-06 DIAGNOSIS — K59.04: Primary | ICD-10-CM

## 2021-04-06 DIAGNOSIS — Z79.899: ICD-10-CM

## 2021-04-06 DIAGNOSIS — Z88.1: ICD-10-CM

## 2021-04-06 DIAGNOSIS — Z79.82: ICD-10-CM

## 2021-04-06 DIAGNOSIS — E03.9: ICD-10-CM

## 2021-04-06 DIAGNOSIS — M19.90: ICD-10-CM

## 2021-04-06 PROCEDURE — 99284 EMERGENCY DEPT VISIT MOD MDM: CPT

## 2021-04-06 PROCEDURE — 80053 COMPREHEN METABOLIC PANEL: CPT

## 2021-04-06 PROCEDURE — 96375 TX/PRO/DX INJ NEW DRUG ADDON: CPT

## 2021-04-06 PROCEDURE — 83690 ASSAY OF LIPASE: CPT

## 2021-04-06 PROCEDURE — 83605 ASSAY OF LACTIC ACID: CPT

## 2021-04-06 PROCEDURE — 74176 CT ABD & PELVIS W/O CONTRAST: CPT

## 2021-04-06 PROCEDURE — 84484 ASSAY OF TROPONIN QUANT: CPT

## 2021-04-06 PROCEDURE — 36415 COLL VENOUS BLD VENIPUNCTURE: CPT

## 2021-04-06 PROCEDURE — 85025 COMPLETE CBC W/AUTO DIFF WBC: CPT

## 2021-04-06 PROCEDURE — 96374 THER/PROPH/DIAG INJ IV PUSH: CPT

## 2021-04-06 NOTE — CT
Abdomen Pelvis wo Cont

 

CLINICAL HISTORY: Generalized abdominal pain 

 

COMPARISON: 1/8/2021.

 

TECHNIQUE: Axial tomographic images are obtained from the dome of the diaphragm

to the pubic symphysis without IV contrast enhancement. Venous access was not

able to be obtained. No oral contrast was used. The dosage reduction and

iterative reconstruction techniques employed.

 

FINDINGS: The lung bases show some minimal patchy atelectasis. There are a few

scattered blebs. The liver shows no mass or biliary dilatation. The gallbladder

has been removed. There are numerous surgical clips in the upper abdomen along

the liver and transverse colon margin. This may be from previous omental

resection. Patient has also had previous small bowel resection and anastomosis.

Soft tissue inflammatory change and fluid collections seen on previous study

have involuted. The small intestinal configuration is nonacute. There are some

scattered colonic diverticula The spleen has a normal size and shape. The

pancreas shows no mass or inflammatory change. There is some fullness of the

left adrenal gland. This is similar to 2018 The kidneys show no stones or

hydronephrosis. The aorta shows moderate atheromatous plaque. There is no

suspicious retroperitoneal adenopathy.

 

IMPRESSION: Multiple prior abdominal pelvic surgeries

 

Previously seen inflammatory change and fluid collections has resolved

 

Diverticulosis without evidence of diverticulitis

## 2021-04-06 NOTE — EDM.PDOC
ED HPI GENERAL MEDICAL PROBLEM





- General


Chief Complaint: Abdominal Pain


Stated Complaint: ABD PAIN


Time Seen by Provider: 21 13:20


Source of Information: Reports: Patient, Family, Old Records, RN Notes Reviewed


History Limitations: Reports: No Limitations





- History of Present Illness


INITIAL COMMENTS - FREE TEXT/NARRATIVE: 





65-year-old female presents emergency department a complaint of abdominal pain, 

she does have an extensive surgical history.  States the abdominal pain started 

today it does come in waves wax and wanes it is generalized she has had some 

nausea no vomiting she has felt feverish


  ** Abdomen


Pain Score (Numeric/FACES): 2





- Related Data


                                    Allergies











Allergy/AdvReac Type Severity Reaction Status Date / Time


 


ciprofloxacin Allergy Intermediate Hives Verified 21 12:34


 


clonidine Allergy Intermediate Other Verified 21 12:34


 


hydromorphone Allergy Intermediate Rash Verified 21 12:34


 


oxycodone [From Percocet] Allergy Intermediate Hives Verified 21 12:34











Home Meds: 


                                    Home Meds





Levothyroxine Sodium [Synthroid] 25 mcg PO .ACBREAKFAST 01/29/15 [History]


ondansetron HCL [Zofran] 8 mg PO Q8H PRN 05/21/15 [History]


Furosemide 40 mg PO DAILY PRN 17 [History]


Sertraline HCl [Zoloft] 150 mg PO DAILY 17 [History]


traZODone 50 - 100 mg PO BEDTIME PRN 17 [History]


ClonazePAM [KlonoPIN] 0.5 mg PO DAILY PRN 17 [History]


Penciclovir [Denavir 1% Crm] 1 applic TOP ASDIRECTED 17 [History]


tiZANidine [Zanaflex] 4 mg PO Q8H PRN 18 [History]


Pantoprazole [ProTONIX***] 40 mg PO DAILY 19 [History]


Albuterol [Proventil Neb Soln] 3 ml NEB Q4H PRN 20 [History]


Cetirizine [ZyrTEC] 10 mg PO DAILY 20 [History]


Cholecalciferol (Vitamin D3) [Vitamin D3] 2,000 unit PO DAILY 20 [History]


Cyanocobalamin (Vitamin B-12) [B-12] 500 mcg PO DAILY 20 [History]


Gabapentin [Neurontin] 300 mg PO BEDTIME 20 [History]


Lidocaine 2% [Xylocaine 2% Jelly] 1 applic TOP DAILY 20 [History]


Varenicline [Chantix] 1 mg PO BID 20 [History]


Albuterol [Ventolin HFA] 2 puff INH Q6H PRN 20 [History]


Fluticasone Propionate [Flonase] 2 spr NASBOTH DAILY PRN 20 [History]


Homeopathic Products (Leg Cramp Complex Or) 1 - 2 tab PO BEDTIME 20 

[History]


Umeclidinium Brm/Vilanterol Tr [Anoro Ellipta 62.5-25 MCG] 1 each INH DAILY 

20 [History]


Aspirin [Halfprin] 81 mg PO DAILY 12/15/20 [History]


Clopidogrel [Plavix] 75 mg PO DAILY 20 [History]


Acetaminophen/HYDROcodone [Norco 325-5 MG] 1 - 2 tab PO Q4H PRN #42 tablet 

21 [Rx]


Docusate Sodium [Colace] 100 mg PO BID #60 cap 21 [Rx]


Nystatin [Mycostatin] 5 ml PO Q6H #200 ml 21 [Rx]











Past Medical History


HEENT History: Reports: Allergic Rhinitis, Impaired Vision


Other HEENT History: wears glasses


Cardiovascular History: Reports: Cardiomyopathy, Heart Murmur, SOB on Exertion


Respiratory History: Reports: Bronchitis, Recurrent, COPD, Pneumonia, Recurrent,

 Sleep Apnea, SOB


Gastrointestinal History: Reports: Bowel Obstruction, Cholelithiasis, Chronic 

Diarrhea, Diverticulosis, GERD, Hiatal Hernia


Genitourinary History: Reports: UTI, Recurrent, Other (See Below)


Other Genitourinary History: bladder suspension


OB/GYN History: Reports: Pregnancy, Spontaneous 


Musculoskeletal History: Reports: Arthritis, Back Pain, Chronic, Fibromyalgia, 

Other (See Below)


Other Musculoskeletal History: right knee pain.  ; wound ulcer right lower leg


Neurological History: Reports: Vertigo


Psychiatric History: Reports: Anxiety, Depression, Suicidal Ideation


Endocrine/Metabolic History: Reports: Hypothyroidism, Obesity/BMI 30+


Hematologic History: Reports: None


Immunologic History: Reports: None


Oncologic (Cancer) History: Reports: Cervix, Uterine


Other Oncologic History: complete hysterectomy 2015


Dermatologic History: Reports: Cellulitis, Other (See Below)


Other Dermatologic History: sore on right lower leg that won't go away; [facility] debrid 2020





- Infectious Disease History


Infectious Disease History: Reports: Chicken Pox, MRSA, Other (See Below)


Other Infectious Disease History: MRSA in right leg in 2019





- Past Surgical History


Head Surgeries/Procedures: Reports: None


HEENT Surgical History: Reports: Other (See Below)


Other HEENT Surgeries/Procedures: biopsy on tongue


Cardiovascular Surgical History: Reports: Varicose, Other (See Below)


Other Cardiovascular Surgeries/Procedures: RFA leg veins


Respiratory Surgical History: Reports: None


GI Surgical History: Reports: Appendectomy, Cholecystectomy, Colon, Colonoscopy,

 EGD, Hernia Repair/Other


Female  Surgical History: Reports: Hysterectomy, Salpingo-Oophorectomy, Other 

(See Below)


Other Female  Surgeries/Procedures: bladder suspension


Endocrine Surgical History: Reports: None


Neurological Surgical History: Reports: None


Musculoskeletal Surgical History: Reports: None, Knee Replacement


Other Musculoskeletal Surgeries/Procedures:: right partial knee 6/15/20


Oncologic Surgical History: Reports: None


Other Oncologic Surgeries/Procedures: hysterectomy


Dermatological Surgical History: Reports: None





Social & Family History





- Family History


Family Medical History: No Pertinent Family History


Cardiac: Reports: Arrhythmia, Pacemaker, Other (See Below)


Other Cardiac Family History: pacer/defib; valve replacement


Respiratory: Reports: COPD


Musculoskeletal: Reports: Arthritis, Back pain, Chronic, Gout


Endocrine/Metabolic: Reports: Diabetes, type II


Oncologic: Reports: Breast, Cervix, Uterine





- Tobacco Use


Tobacco Use Status *Q: Current Every Day Tobacco User


Years of Tobacco use: 45


Packs/Tins Daily: 0.5





- Caffeine Use


Caffeine Use: Reports: Coffee, Soda


Caffeine Use Comment: 2 cups coffee.  2-6 diet cokes daily





- Recreational Drug Use


Recreational Drug Use: No





- Living Situation & Occupation


Living situation: Reports:  (Lives with her 80+ mother, and her sister 

is in the next house. Has 1 son and 2 grandchildren , who she is currently 

estranged from her child and grandchildren.)





ED ROS GENERAL





- Review of Systems


Review Of Systems: See Below


Constitutional: Reports: Fever


HEENT: Reports: No Symptoms


Respiratory: Reports: No Symptoms


Cardiovascular: Reports: No Symptoms


GI/Abdominal: Reports: Abdominal Pain, Flatus, Nausea.  Denies: Vomiting


: Reports: No Symptoms





ED EXAM, GI/ABD





- Physical Exam


Exam: See Below


Exam Limited By: No Limitations


General Appearance: Alert, WD/WN, No Apparent Distress


Respiratory/Chest: No Respiratory Distress, Lungs Clear, Normal Breath Sounds, 

No Accessory Muscle Use, Chest Non-Tender


Cardiovascular: Regular Rate, Rhythm, No Murmur


GI/Abdominal Exam: Normal Bowel Sounds, Soft, Tender (Generalized)





Course





- Vital Signs


Last Recorded V/S: 


                                Last Vital Signs











Temp  97.7 F   21 12:30


 


Pulse  56 L  21 15:20


 


Resp  16   21 15:20


 


BP  140/68   21 15:20


 


Pulse Ox  95   21 15:20














- Orders/Labs/Meds


Orders: 


                               Active Orders 24 hr











 Category Date Time Status


 


 Peripheral IV Care [RC] .AS DIRECTED Care  21 13:38 Active


 


 Lactated Ringers [Ringers, Lactated] 1,000 ml Med  21 13:45 Active





 IV ASDIRECTED   


 


 Sodium Chloride 0.9% [Saline Flush] Med  21 13:37 Active





 10 ml FLUSH ASDIRECTED PRN   


 


 Peripheral IV Insertion Adult [OM.PC] Urgent Oth  21 13:37 Ordered








                                Medication Orders





Lactated Ringer's (Ringers, Lactated)  1,000 mls @ 999 mls/hr IV ASDIRECTED ABEL


   Last Admin: 21 14:06  Dose: 999 mls/hr


   Documented by: KATHE


Sodium Chloride (Sodium Chloride 0.9% 10 Ml Syringe)  10 ml FLUSH ASDIRECTED PRN


   PRN Reason: Keep Vein Open


   Last Admin: 21 14:07  Dose: 10 ml


   Documented by: KATHE








Labs: 


                                Laboratory Tests











  21 Range/Units





  13:50 13:50 13:50 


 


WBC  6.2    (4.5-11.0)  K/uL


 


RBC  3.95    (3.30-5.50)  M/uL


 


Hgb  11.4 L D    (12.0-15.0)  g/dL


 


Hct  35.0 L    (36.0-48.0)  %


 


MCV  89    (80-98)  fL


 


MCH  29    (27-31)  pg


 


MCHC  33    (32-36)  %


 


Plt Count  216    (150-400)  K/uL


 


Neut % (Auto)  55    (36-66)  %


 


Lymph % (Auto)  36    (24-44)  %


 


Mono % (Auto)  7 H    (2-6)  %


 


Eos % (Auto)  2    (2-4)  %


 


Baso % (Auto)  0    (0-1)  %


 


Sodium   144   (140-148)  mmol/L


 


Potassium   4.1   (3.6-5.2)  mmol/L


 


Chloride   106   (100-108)  mmol/L


 


Carbon Dioxide   28   (21-32)  mmol/L


 


Anion Gap   10.1   (5.0-14.0)  mmol/L


 


BUN   20 H D   (7-18)  mg/dL


 


Creatinine   0.7   (0.6-1.0)  mg/dL


 


Est Cr Clr Drug Dosing   61.27   mL/min


 


Estimated GFR (MDRD)   > 60   (>60)  


 


Glucose   79   ()  mg/dL


 


Lactic Acid    0.6  (0.4-2.0)  mmol/L


 


Calcium   9.5   (8.5-10.1)  mg/dL


 


Total Bilirubin   0.3   (0.2-1.0)  mg/dL


 


AST   18   (15-37)  U/L


 


ALT   23   (12-78)  U/L


 


Alkaline Phosphatase   86   ()  U/L


 


Troponin I   < 0.017   (0.000-0.056)  ng/mL


 


Total Protein   7.2   (6.4-8.2)  g/dL


 


Albumin   3.3 L   (3.4-5.0)  g/dL


 


Globulin   3.9 H   (2.3-3.5)  g/dL


 


Albumin/Globulin Ratio   0.9 L   (1.2-2.2)  


 


Lipase   169   ()  U/L











Meds: 


Medications











Generic Name Dose Route Start Last Admin





  Trade Name Freq  PRN Reason Stop Dose Admin


 


Lactated Ringer's  1,000 mls @ 999 mls/hr  21 13:45  21 14:06





  Ringers, Lactated  IV   999 mls/hr





  ASDIRECTED ABEL   Administration


 


Sodium Chloride  10 ml  21 13:37  21 14:07





  Sodium Chloride 0.9% 10 Ml Syringe  FLUSH   10 ml





  ASDIRECTED PRN   Administration





  Keep Vein Open  














Discontinued Medications














Generic Name Dose Route Start Last Admin





  Trade Name Guerda  PRN Reason Stop Dose Admin


 


Fentanyl  50 mcg  21 13:38  21 14:03





  Fentanyl 100 Mcg/2 Ml Sdv  IVPUSH  21 13:39  50 mcg





  ONETIME ONE   Administration


 


Sodium Chloride  77 mls @ 3.5 mls/sec  21 14:15 





  Normal Saline  IV  21 14:16 





  ASDIRECTED ABEL  


 


Iopamidol  111 ml  21 14:14 





  Iopamidol 612 Mg/Ml 500 Ml Multipack Bottle  IV  21 14:15 





  ONETIME ONE  


 


Ondansetron HCl  4 mg  21 13:38  21 14:04





  Ondansetron 4 Mg/2 Ml Sdv  IVPUSH  21 13:39  4 mg





  ONETIME ONE   Administration


 


Sodium Chloride  10 ml  21 14:14 





  Sodium Chloride 0.9% 10 Ml Syringe  FLUSH  21 14:15 





  ONETIME ONE  














Departure





- Departure


Time of Disposition: 15:38


Disposition: Home, Self-Care 01


Condition: Fair


Clinical Impression: 


 Functional constipation








- Discharge Information


Instructions:  Constipation, Adult


Referrals: 


PCP,None [Primary Care Provider] - 


Forms:  ED Department Discharge


Additional Instructions: 


Try the MiraLAX 1 capful per day until loose stools,  please followup with your 

primary care provider in 3-5 days if not better, please call return to the 

emergency department with worsening of symptoms.





Sepsis Event Note (ED)





- Evaluation


Sepsis Screening Result: No Definite Risk





- Focused Exam


Vital Signs: 


                                   Vital Signs











  Temp Pulse Resp BP Pulse Ox


 


 21 15:20   56 L  16  140/68  95


 


 21 12:30  97.7 F  68  16  125/58 L  95














- My Orders


Last 24 Hours: 


My Active Orders





21 13:37


Sodium Chloride 0.9% [Saline Flush]   10 ml FLUSH ASDIRECTED PRN 


Peripheral IV Insertion Adult [OM.PC] Urgent 





21 13:38


Peripheral IV Care [RC] .AS DIRECTED 





21 13:45


Lactated Ringers [Ringers, Lactated] 1,000 ml IV ASDIRECTED 














- Assessment/Plan


Last 24 Hours: 


My Active Orders





21 13:37


Sodium Chloride 0.9% [Saline Flush]   10 ml FLUSH ASDIRECTED PRN 


Peripheral IV Insertion Adult [OM.PC] Urgent 





21 13:38


Peripheral IV Care [RC] .AS DIRECTED 





21 13:45


Lactated Ringers [Ringers, Lactated] 1,000 ml IV ASDIRECTED 











Plan: 





Assessment





Acuity = acute





Site and laterality = functional constipation





Etiology  = slow transit time





Manifestations = abdominal pain





Location of injury =  Home





Lab values = CBC, CMP, lactic acid, lipase, troponin all within normal limits CT

scan of the abdomen shows no acute process





Plan


I did review lab work CT scan results with her plan is to try MiraLAX 1 capful 

per day and follow-up with primary care next 3 to 5 days if not better

















 This note was dictated using dragon voice recognition software please call with

any questions on syntax or grammar.

## 2021-05-28 ENCOUNTER — HOSPITAL ENCOUNTER (EMERGENCY)
Dept: HOSPITAL 11 - JP.ED | Age: 66
Discharge: HOME | End: 2021-05-28
Payer: MEDICARE

## 2021-05-28 VITALS — SYSTOLIC BLOOD PRESSURE: 123 MMHG | DIASTOLIC BLOOD PRESSURE: 39 MMHG | HEART RATE: 72 BPM

## 2021-05-28 DIAGNOSIS — K56.7: Primary | ICD-10-CM

## 2021-05-28 DIAGNOSIS — Z79.82: ICD-10-CM

## 2021-05-28 DIAGNOSIS — E66.9: ICD-10-CM

## 2021-05-28 DIAGNOSIS — Z79.899: ICD-10-CM

## 2021-05-28 DIAGNOSIS — R11.2: ICD-10-CM

## 2021-05-28 DIAGNOSIS — E03.9: ICD-10-CM

## 2021-05-28 DIAGNOSIS — Z79.02: ICD-10-CM

## 2021-05-28 DIAGNOSIS — M19.90: ICD-10-CM

## 2021-05-28 DIAGNOSIS — Z88.8: ICD-10-CM

## 2021-05-28 DIAGNOSIS — R19.7: ICD-10-CM

## 2021-05-28 DIAGNOSIS — Z88.5: ICD-10-CM

## 2021-05-28 DIAGNOSIS — J44.9: ICD-10-CM

## 2021-05-28 DIAGNOSIS — Z88.1: ICD-10-CM

## 2021-05-28 DIAGNOSIS — K21.9: ICD-10-CM

## 2021-05-28 PROCEDURE — 96374 THER/PROPH/DIAG INJ IV PUSH: CPT

## 2021-05-28 PROCEDURE — 99284 EMERGENCY DEPT VISIT MOD MDM: CPT

## 2021-05-28 PROCEDURE — 36415 COLL VENOUS BLD VENIPUNCTURE: CPT

## 2021-05-28 PROCEDURE — 85027 COMPLETE CBC AUTOMATED: CPT

## 2021-05-28 PROCEDURE — 74019 RADEX ABDOMEN 2 VIEWS: CPT

## 2021-05-28 PROCEDURE — 74177 CT ABD & PELVIS W/CONTRAST: CPT

## 2021-05-28 PROCEDURE — 80048 BASIC METABOLIC PNL TOTAL CA: CPT

## 2021-05-28 PROCEDURE — 81001 URINALYSIS AUTO W/SCOPE: CPT

## 2021-05-28 NOTE — CR
Abdomen 2V AP Flat Upright

 

CLINICAL HISTORY: History of obstruction, vomiting and diarrhea

 

FINDINGS: There are dilated loops of small bowel with scattered air-fluid

levels. No free air is identified. There are surgical clips and sutures in the

upper abdomen

 

IMPRESSION: Small bowel distention with scattered air-fluid levels. Small bowel

obstruction is not excluded.

 

Previous abdominal surgery

## 2021-05-28 NOTE — EDM.PDOC
ED HPI GENERAL MEDICAL PROBLEM





- General


Chief Complaint: Gastrointestinal Problem


Stated Complaint: HEADACHE, VOMITING(BROWN), DIARRHEA


Time Seen by Provider: 21 13:40


Source of Information: Reports: Patient, Old Records


History Limitations: Reports: No Limitations





- History of Present Illness


INITIAL COMMENTS - FREE TEXT/NARRATIVE: 





64 yo female here with nausea, vomiting and diarrhea. The diarrhea began about 

36 hrs ago. 12 hrs ago she vomited several times. Has not been eating or 

drinking so no more vomiting, but still has nausea. Felt distended before the 

vomiting began, not now. No blood in her emesis or stool. No fever. Has a PHx of

multiple surgeries and is concerned about bowel obstruction as she has had this 

in the past. 


Onset: Gradual


Onset Date: 21


Duration: Hour(s): (36), Waxing/Waning


Location: Reports: Abdomen


Quality: Reports: Dull


Severity: Mild


Improves with: Denies: Eating (not eating)


Worsens with: Reports: Eating


Context: Reports: Other (See HPI)


Associated Symptoms: Reports: Nausea/Vomiting, Other (diarrhea).  Denies: 

Fever/Chills


Treatments PTA: Reports: Other (see below) (none)





- Related Data


                                    Allergies











Allergy/AdvReac Type Severity Reaction Status Date / Time


 


ciprofloxacin Allergy Intermediate Hives Verified 21 13:29


 


clonidine Allergy Intermediate Other Verified 21 13:29


 


hydromorphone Allergy Intermediate Rash Verified 21 13:29


 


oxycodone [From Percocet] Allergy Intermediate Hives Verified 21 13:29











Home Meds: 


                                    Home Meds





Levothyroxine Sodium [Synthroid] 25 mcg PO .ACBREAKFAST 01/29/15 [History]


ondansetron HCL [Zofran] 8 mg PO Q8H PRN 05/21/15 [History]


Furosemide 40 mg PO DAILY PRN 17 [History]


Sertraline HCl [Zoloft] 150 mg PO DAILY 17 [History]


traZODone 50 - 100 mg PO BEDTIME PRN 17 [History]


ClonazePAM [KlonoPIN] 0.5 mg PO DAILY PRN 17 [History]


Penciclovir [Denavir 1% Crm] 1 applic TOP ASDIRECTED 17 [History]


tiZANidine [Zanaflex] 4 mg PO Q8H PRN 18 [History]


Pantoprazole [ProTONIX***] 40 mg PO DAILY 19 [History]


Albuterol [Proventil Neb Soln] 3 ml NEB Q4H PRN 20 [History]


Cetirizine [ZyrTEC] 10 mg PO DAILY 20 [History]


Cholecalciferol (Vitamin D3) [Vitamin D3] 2,000 unit PO DAILY 20 [History]


Cyanocobalamin (Vitamin B-12) [B-12] 500 mcg PO DAILY 20 [History]


Gabapentin [Neurontin] 300 mg PO BEDTIME 20 [History]


Lidocaine 2% [Xylocaine 2% Jelly] 1 applic TOP DAILY 20 [History]


Varenicline [Chantix] 1 mg PO BID 20 [History]


Albuterol [Ventolin HFA] 2 puff INH Q6H PRN 20 [History]


Fluticasone Propionate [Flonase] 2 spr NASBOTH DAILY PRN 20 [History]


Homeopathic Products (Leg Cramp Complex Or) 1 - 2 tab PO BEDTIME 20 

[History]


Umeclidinium Brm/Vilanterol Tr [Anoro Ellipta 62.5-25 MCG] 1 each INH DAILY 

20 [History]


Aspirin [Halfprin] 81 mg PO DAILY 12/15/20 [History]


Clopidogrel [Plavix] 75 mg PO DAILY 20 [History]


Acetaminophen/HYDROcodone [Norco 325-5 MG] 1 - 2 tab PO Q4H PRN #42 tablet 

21 [Rx]


Docusate Sodium [Colace] 100 mg PO BID #60 cap 21 [Rx]


Nystatin [Mycostatin] 5 ml PO Q6H #200 ml 21 [Rx]


Ondansetron [Zofran ODT] 4 mg PO Q6H PRN #10 tab.dis 21 [Rx]











Past Medical History


HEENT History: Reports: Allergic Rhinitis, Impaired Vision


Other HEENT History: wears glasses


Cardiovascular History: Reports: Cardiomyopathy, Heart Murmur, SOB on Exertion


Respiratory History: Reports: Bronchitis, Recurrent, COPD, Pneumonia, Recurrent,

 Sleep Apnea, SOB


Gastrointestinal History: Reports: Bowel Obstruction, Cholelithiasis, Chronic 

Diarrhea, Diverticulosis, GERD, Hiatal Hernia


Genitourinary History: Reports: UTI, Recurrent, Other (See Below)


Other Genitourinary History: bladder suspension


OB/GYN History: Reports: Pregnancy, Spontaneous 


Musculoskeletal History: Reports: Arthritis, Back Pain, Chronic, Fibromyalgia, 

Other (See Below)


Other Musculoskeletal History: right knee pain.  ; wound ulcer right lower leg


Neurological History: Reports: Vertigo


Psychiatric History: Reports: Anxiety, Depression, Suicidal Ideation


Endocrine/Metabolic History: Reports: Hypothyroidism, Obesity/BMI 30+


Hematologic History: Reports: Anticoagulation Therapy


Immunologic History: Reports: None


Oncologic (Cancer) History: Reports: Cervix, Uterine


Other Oncologic History: complete hysterectomy 2015


Dermatologic History: Reports: Cellulitis, Other (See Below)


Other Dermatologic History: sore on right lower leg that won't go away; [facility] debrid 2020





- Infectious Disease History


Infectious Disease History: Reports: Chicken Pox, MRSA, Other (See Below)


Other Infectious Disease History: MRSA in right leg in 2019





- Past Surgical History


Head Surgeries/Procedures: Reports: None


HEENT Surgical History: Reports: Other (See Below)


Other HEENT Surgeries/Procedures: biopsy on tongue


Cardiovascular Surgical History: Reports: Varicose, Other (See Below)


Other Cardiovascular Surgeries/Procedures: RFA leg veins


Respiratory Surgical History: Reports: None


GI Surgical History: Reports: Appendectomy, Cholecystectomy, Colon, Colonoscopy,

 EGD, Hernia Repair/Other


Female  Surgical History: Reports: Hysterectomy, Salpingo-Oophorectomy, Other 

(See Below)


Other Female  Surgeries/Procedures: bladder suspension


Endocrine Surgical History: Reports: None


Neurological Surgical History: Reports: None


Musculoskeletal Surgical History: Reports: None, Knee Replacement


Other Musculoskeletal Surgeries/Procedures:: right partial knee 6/15/20


Oncologic Surgical History: Reports: None


Other Oncologic Surgeries/Procedures: hysterectomy


Dermatological Surgical History: Reports: None





Social & Family History





- Family History


Family Medical History: No Pertinent Family History


Cardiac: Reports: Arrhythmia, Pacemaker, Other (See Below)


Other Cardiac Family History: pacer/defib; valve replacement


Respiratory: Reports: COPD


Musculoskeletal: Reports: Arthritis, Back pain, Chronic, Gout


Endocrine/Metabolic: Reports: Diabetes, type II


Oncologic: Reports: Breast, Cervix, Uterine





- Caffeine Use


Caffeine Use: Reports: Coffee, Soda


Caffeine Use Comment: 2 cups coffee.  2-6 diet cokes daily





- Living Situation & Occupation


Living situation: Reports:  (Lives with her 80+ mother, and her sister 

is in the next house. Has 1 son and 2 grandchildren , who she is currently est

ranged from her child and grandchildren.)





ED ROS GENERAL





- Review of Systems


Review Of Systems: See Below


Constitutional: Reports: No Symptoms


HEENT: Reports: No Symptoms


Respiratory: Reports: No Symptoms


Cardiovascular: Reports: No Symptoms


GI/Abdominal: Reports: Abdominal Pain, Diarrhea, Nausea, Vomiting.  Denies: 

Black Stool, Bloody Stool, Constipation, Distension (now improved), Hematemesis,

 Hematochezia, Melena


: Reports: No Symptoms


Musculoskeletal: Reports: No Symptoms


Skin: Reports: No Symptoms


Neurological: Reports: No Symptoms





ED EXAM, GI/ABD





- Physical Exam


Exam: See Below


Exam Limited By: No Limitations


General Appearance: Alert, WD/WN, No Apparent Distress


Eyes: Bilateral: Normal Appearance


Ears: Normal External Exam, Normal Canal, Hearing Grossly Normal


Nose: Normal Inspection, No Blood


Throat/Mouth: Normal Inspection, Normal Lips, Normal Oropharynx, Normal Voice, 

No Airway Compromise


Head: Atraumatic, Normocephalic


Neck: Normal Inspection


Respiratory/Chest: No Respiratory Distress, Lungs Clear, Normal Breath Sounds, 

No Accessory Muscle Use


Cardiovascular: Regular Rate, Rhythm, No Edema


GI/Abdominal Exam: Normal Bowel Sounds, Soft, Non-Tender, No Distention, 

Abnormal Bowel Sounds (slightly decreased).  No: Distended, Guarding, Rigid, 

Rebound, Tender


Back Exam: Normal Inspection


Extremities: Normal Inspection, Normal Range of Motion, Non-Tender, No Pedal 

Edema


Neurological: Alert, Oriented, CN II-XII Intact, Normal Cognition, No 

Motor/Sensory Deficits


Psychiatric: Normal Affect, Normal Mood


Skin Exam: Warm, Dry, Intact, Normal Color, No Rash





Course





- Vital Signs


Last Recorded V/S: 


                                Last Vital Signs











Temp  36.8 C   21 13:34


 


Pulse  64   21 15:40


 


Resp  16   21 13:34


 


BP  112/64   21 15:40


 


Pulse Ox  92 L  21 15:40














- Orders/Labs/Meds


Orders: 


                               Active Orders 24 hr











 Category Date Time Status


 


 Iopamidol [Isovue-300 (61%)] Med  21 14:45 Active





 100 ml IV .AS DIRECTED   


 


 Sodium Chloride 0.9% [Normal Saline] 80 ml Med  21 14:45 Active





 IV ASDIRECTED   








                                Medication Orders





Sodium Chloride (Normal Saline)  80 mls @ 3.5 mls/sec IV ASDIRECTED ABEL


   Last Admin: 21 15:10  Dose: 3 mls/sec


   Documented by: GAURAV


Iopamidol (Iopamidol 612 Mg/Ml 100 Ml Bottle)  100 ml IV .AS DIRECTED ABEL


   Last Admin: 21 15:10  Dose: 100 ml


   Documented by: GAURAV








Labs: 


                                Laboratory Tests











  21 Range/Units





  13:58 13:58 14:06 


 


WBC  5.3    (4.5-11.0)  K/uL


 


RBC  4.83    (3.30-5.50)  M/uL


 


Hgb  13.5  D    (12.0-15.0)  g/dL


 


Hct  43.3    (36.0-48.0)  %


 


MCV  90    (80-98)  fL


 


MCH  28    (27-31)  pg


 


MCHC  31 L    (32-36)  %


 


Plt Count  271    (150-400)  K/uL


 


Sodium   139 L   (140-148)  mmol/L


 


Potassium   3.6   (3.6-5.2)  mmol/L


 


Chloride   103   (100-108)  mmol/L


 


Carbon Dioxide   25   (21-32)  mmol/L


 


Anion Gap   14.6 H   (5.0-14.0)  mmol/L


 


BUN   21 H   (7-18)  mg/dL


 


Creatinine   1.0   (0.6-1.0)  mg/dL


 


Est Cr Clr Drug Dosing   42.89   mL/min


 


Estimated GFR (MDRD)   56 L   (>60)  


 


Glucose   85   ()  mg/dL


 


Calcium   8.8   (8.5-10.1)  mg/dL


 


Urine Color    Yellow  (YELLOW)  


 


Urine Appearance    Clear  (CLEAR)  


 


Urine pH    5.5  (5.0-8.0)  


 


Ur Specific Gravity    1.025  (1.008-1.030)  


 


Urine Protein    Negative  (NEGATIVE)  mg/dL


 


Urine Glucose (UA)    Negative  (NEGATIVE)  mg/dL


 


Urine Ketones    Negative  (NEGATIVE)  mg/dL


 


Urine Occult Blood    Negative  (NEGATIVE)  


 


Urine Nitrite    Negative  (NEGATIVE)  


 


Urine Bilirubin    Negative  (NEGATIVE)  


 


Urine Urobilinogen    0.2  (0.2-1.0)  EU/dL


 


Ur Leukocyte Esterase    Negative  (NEGATIVE)  


 


Urine RBC    0-5  (0-5)  


 


Urine WBC    0-5  (0-5)  


 


Ur Epithelial Cells    Few  


 


Amorphous Sediment    Occasional  


 


Urine Bacteria    Occasional  


 


Urine Mucus    Occasional  


 


Urine Other      











Meds: 


Medications











Generic Name Dose Route Start Last Admin





  Trade Name Freq  PRN Reason Stop Dose Admin


 


Sodium Chloride  80 mls @ 3.5 mls/sec  21 14:45  21 15:10





  Normal Saline  IV   3 mls/sec





  ASDIRECTED ABEL   Administration


 


Iopamidol  100 ml  21 14:45  21 15:10





  Iopamidol 612 Mg/Ml 100 Ml Bottle  IV   100 ml





  .AS DIRECTED ABEL   Administration














Discontinued Medications














Generic Name Dose Route Start Last Admin





  Trade Name Freq  PRN Reason Stop Dose Admin


 


Lactated Ringer's  1,000 mls @ 1,000 mls/hr  21 13:45  21 14:02





  Ringers, Lactated  IV  21 14:44  1,000 mls/hr





  BOLUS ONE   Administration


 


Loperamide HCl  4 mg  21 16:15  21 16:12





  Loperamide 2 Mg Cap  PO  21 16:16  4 mg





  ONETIME ONE   Administration


 


Ondansetron HCl  4 mg  21 13:46  21 14:05





  Ondansetron 4 Mg/2 Ml Sdv  IVPUSH  21 13:47  4 mg





  ONETIME ONE   Administration


 


Sodium Chloride  10 ml  21 14:39  21 15:10





  Sodium Chloride 0.9% 10 Ml Syringe  FLUSH  21 14:40  10 ml





  ONETIME ONE   Administration














- Radiology Interpretation


Free Text/Narrative:: 





Flat/upright Abd X-rays-multiple air/fluid levels





CT abd/pelvis with IV contrast-Distended fluid filled colon, mild small bowel 

distention in a nonspecific pattern. 





- Re-Assessments/Exams


Free Text/Narrative Re-Assessment/Exam: 





21 16:22


Not fully resolved of her sx's. No beds available here. Does not want to be 

transferred. Will try it at home over night. 





Departure





- Departure


Time of Disposition: 16:25


Disposition: Home, Self-Care 01


Condition: Fair


Clinical Impression: 


 Nausea vomiting and diarrhea, Ileus








- Discharge Information


*PRESCRIPTION DRUG MONITORING PROGRAM REVIEWED*: Not Applicable


*COPY OF PRESCRIPTION DRUG MONITORING REPORT IN PATIENT KENISHA: Not Applicable


Prescriptions: 


Ondansetron [Zofran ODT] 4 mg PO Q6H PRN #10 tab.dis


 PRN Reason: Nausea


Instructions:  Nausea and Vomiting, Adult, Easy-to-Read, Diarrhea, Adult, 

Easy-to-Read


Referrals: 


Gabriel Schmidt MD [Primary Care Provider] - 


Forms:  ED Department Discharge


Additional Instructions: 


Use Zofran as directed for nausea control. Use loperamide per package 

instructions for diarrhea control. Sips of a clear liquid like Gatorade only 

until feeling better. Return tomorrow if not feeling better as we might have 

available beds by then? 





Sepsis Event Note (ED)





- Evaluation


Sepsis Screening Result: No Definite Risk





- Focused Exam


Vital Signs: 


                                   Vital Signs











  Temp Pulse Resp BP Pulse Ox


 


 21 15:40   64   112/64  92 L


 


 21 13:34  36.8 C  80  16  131/68  95


 


 21 13:29  36.8 C  80  16  131/68  95














- My Orders


Last 24 Hours: 


My Active Orders





21 14:45


Iopamidol [Isovue-300 (61%)]   100 ml IV .AS DIRECTED 


Sodium Chloride 0.9% [Normal Saline] 80 ml IV ASDIRECTED 














- Assessment/Plan


Last 24 Hours: 


My Active Orders





21 14:45


Iopamidol [Isovue-300 (61%)]   100 ml IV .AS DIRECTED 


Sodium Chloride 0.9% [Normal Saline] 80 ml IV ASDIRECTED

## 2021-05-28 NOTE — CT
Abdomen Pelvis w Cont

 

CLINICAL HISTORY: Abdominal pain, distention

 

COMPARISON: April 2021. 

 

TECHNIQUE: Transverse scans were obtained from the base of the lungs to the

pubic symphysis following oral contrast and IV infusion of contrast.Auto dosage

reduction and iterative reconstructiontechniques employed.

 

FINDINGS: There is a distended fluid-filled colon. There is sigmoid

diverticulosis without evidence of diverticulitis There is mild small bowel

distention in a nonspecific pattern. There is a left lower quadrant anastomosis

The lung bases show some patchy pleural parenchymal scarring and a few scattered

blebs. The liver shows no mass or biliary dilatation. The gallbladder has been

removed. There is been previous epigastric surgery. The spleen has a normal size

and shape. The pancreas shows no mass or inflammatory change. The adrenal glands

appear normal bilaterally . The kidneys show no mass, stones or hydronephrosis.

The ureters have a normal course and caliber the bladder has normal contour..

The aorta shows diffuse atheromatous plaque. There is a stent in the right

common iliac artery.

There is no suspicious retroperitoneal adenopathy. 

 

IMPRESSION: Previous gastric and left lower quadrant surgery. This may be

previous bariatric surgery.

 

Distended fluid-filled colon

 

Mild small bowel distention in a nonspecific pattern

## 2021-05-31 ENCOUNTER — HOSPITAL ENCOUNTER (EMERGENCY)
Dept: HOSPITAL 11 - JP.ED | Age: 66
Discharge: HOME | End: 2021-05-31
Payer: MEDICARE

## 2021-05-31 VITALS — HEART RATE: 82 BPM | SYSTOLIC BLOOD PRESSURE: 118 MMHG | DIASTOLIC BLOOD PRESSURE: 57 MMHG

## 2021-05-31 DIAGNOSIS — Z79.02: ICD-10-CM

## 2021-05-31 DIAGNOSIS — E66.9: ICD-10-CM

## 2021-05-31 DIAGNOSIS — Z79.82: ICD-10-CM

## 2021-05-31 DIAGNOSIS — R19.7: Primary | ICD-10-CM

## 2021-05-31 DIAGNOSIS — E03.9: ICD-10-CM

## 2021-05-31 DIAGNOSIS — Z88.5: ICD-10-CM

## 2021-05-31 DIAGNOSIS — Z88.1: ICD-10-CM

## 2021-05-31 DIAGNOSIS — J44.9: ICD-10-CM

## 2021-05-31 PROCEDURE — 87177 OVA AND PARASITES SMEARS: CPT

## 2021-05-31 PROCEDURE — 80048 BASIC METABOLIC PNL TOTAL CA: CPT

## 2021-05-31 PROCEDURE — 85025 COMPLETE CBC W/AUTO DIFF WBC: CPT

## 2021-05-31 PROCEDURE — 99284 EMERGENCY DEPT VISIT MOD MDM: CPT

## 2021-05-31 PROCEDURE — 87899 AGENT NOS ASSAY W/OPTIC: CPT

## 2021-05-31 PROCEDURE — 87209 SMEAR COMPLEX STAIN: CPT

## 2021-05-31 PROCEDURE — 87493 C DIFF AMPLIFIED PROBE: CPT

## 2021-05-31 PROCEDURE — 36415 COLL VENOUS BLD VENIPUNCTURE: CPT

## 2021-05-31 PROCEDURE — 87046 STOOL CULTR AEROBIC BACT EA: CPT

## 2021-05-31 PROCEDURE — 83735 ASSAY OF MAGNESIUM: CPT

## 2021-05-31 NOTE — EDM.PDOC
ED HPI GENERAL MEDICAL PROBLEM





- General


Chief Complaint: Gastrointestinal Problem


Stated Complaint: STOOLS ARE BLACK


Time Seen by Provider: 21 15:00


Source of Information: Reports: Patient


History Limitations: Reports: No Limitations





- History of Present Illness


INITIAL COMMENTS - FREE TEXT/NARRATIVE: 


This is a 65-year-old female with history of prior abdominal surgeries who 

presents with concerns of diarrhea.  She was seen in the ED for the same 

concerns 4 days ago.  Reports at that time she had an emesis accompanied by at 

that time dark diarrhea that has now faded to light brown.  She has had no 

fevers or chills.  She has an diffuse crampy abdominal pain.  She was x-ray of 

anesthesia constipation recently and was on MiraLAX.  For this reason she has 

not been taking loperamide for concern that she may become "bound up".  She has 

not been having any vomiting now.  Last night she ate pork chop and 

accompaniments.





  ** Abdomen


Pain Score (Numeric/FACES): 10





- Related Data


                                    Allergies











Allergy/AdvReac Type Severity Reaction Status Date / Time


 


ciprofloxacin Allergy Intermediate Hives Verified 21 14:25


 


clonidine Allergy Intermediate Other Verified 21 14:25


 


hydromorphone Allergy Intermediate Rash Verified 21 14:25


 


oxycodone [From Percocet] Allergy Intermediate Hives Verified 21 14:25











Home Meds: 


                                    Home Meds





Levothyroxine Sodium [Synthroid] 25 mcg PO .ACBREAKFAST 01/29/15 [History]


ondansetron HCL [Zofran] 8 mg PO Q8H PRN 05/21/15 [History]


Furosemide 40 mg PO DAILY PRN 17 [History]


Sertraline HCl [Zoloft] 150 mg PO DAILY 17 [History]


traZODone 50 - 100 mg PO BEDTIME PRN 17 [History]


ClonazePAM [KlonoPIN] 0.5 mg PO DAILY PRN 17 [History]


Penciclovir [Denavir 1% Crm] 1 applic TOP ASDIRECTED 17 [History]


tiZANidine [Zanaflex] 4 mg PO Q8H PRN 18 [History]


Pantoprazole [ProTONIX***] 40 mg PO DAILY 19 [History]


Albuterol [Proventil Neb Soln] 3 ml NEB Q4H PRN 20 [History]


Cetirizine [ZyrTEC] 10 mg PO DAILY 20 [History]


Cholecalciferol (Vitamin D3) [Vitamin D3] 2,000 unit PO DAILY 20 [History]


Cyanocobalamin (Vitamin B-12) [B-12] 500 mcg PO DAILY 20 [History]


Gabapentin [Neurontin] 300 mg PO BEDTIME 20 [History]


Lidocaine 2% [Xylocaine 2% Jelly] 1 applic TOP DAILY 20 [History]


Varenicline [Chantix] 1 mg PO BID 20 [History]


Albuterol [Ventolin HFA] 2 puff INH Q6H PRN 20 [History]


Fluticasone Propionate [Flonase] 2 spr NASBOTH DAILY PRN 20 [History]


Homeopathic Products (Leg Cramp Complex Or) 1 - 2 tab PO BEDTIME 20 

[History]


Umeclidinium Brm/Vilanterol Tr [Anoro Ellipta 62.5-25 MCG] 1 each INH DAILY 

20 [History]


Aspirin [Halfprin] 81 mg PO DAILY 12/15/20 [History]


Clopidogrel [Plavix] 75 mg PO DAILY 20 [History]


Acetaminophen/HYDROcodone [Norco 325-5 MG] 1 - 2 tab PO Q4H PRN #42 tablet 

21 [Rx]


Docusate Sodium [Colace] 100 mg PO BID #60 cap 21 [Rx]


Nystatin [Mycostatin] 5 ml PO Q6H #200 ml 21 [Rx]


Ondansetron [Zofran ODT] 4 mg PO Q6H PRN #10 tab.dis 21 [Rx]











Past Medical History


HEENT History: Reports: Allergic Rhinitis, Impaired Vision


Other HEENT History: wears glasses


Cardiovascular History: Reports: Cardiomyopathy, Heart Murmur, SOB on Exertion


Respiratory History: Reports: Bronchitis, Recurrent, COPD, Pneumonia, Recurrent,

 Sleep Apnea, SOB


Gastrointestinal History: Reports: Bowel Obstruction, Cholelithiasis, Chronic 

Diarrhea, Diverticulosis, GERD, Hiatal Hernia


Genitourinary History: Reports: UTI, Recurrent, Other (See Below)


Other Genitourinary History: bladder suspension


OB/GYN History: Reports: Pregnancy, Spontaneous 


Musculoskeletal History: Reports: Arthritis, Back Pain, Chronic, Fibromyalgia, 

Other (See Below)


Other Musculoskeletal History: right knee pain.  ; wound ulcer right lower leg


Neurological History: Reports: Vertigo


Psychiatric History: Reports: Anxiety, Depression, Suicidal Ideation


Endocrine/Metabolic History: Reports: Hypothyroidism, Obesity/BMI 30+


Hematologic History: Reports: Anticoagulation Therapy


Immunologic History: Reports: None


Oncologic (Cancer) History: Reports: Cervix, Uterine


Other Oncologic History: complete hysterectomy 2015


Dermatologic History: Reports: Cellulitis, Other (See Below)


Other Dermatologic History: sore on right lower leg that won't go away; [facility] debrid 2020





- Infectious Disease History


Infectious Disease History: Reports: Chicken Pox, MRSA, Other (See Below)


Other Infectious Disease History: MRSA in right leg in 2019





- Past Surgical History


Head Surgeries/Procedures: Reports: None


HEENT Surgical History: Reports: Other (See Below)


Other HEENT Surgeries/Procedures: biopsy on tongue


Cardiovascular Surgical History: Reports: Varicose, Other (See Below)


Other Cardiovascular Surgeries/Procedures: RFA leg veins


Respiratory Surgical History: Reports: None


GI Surgical History: Reports: Appendectomy, Cholecystectomy, Colon, Colonoscopy,

 EGD, Hernia Repair/Other


Female  Surgical History: Reports: Hysterectomy, Salpingo-Oophorectomy, Other 

(See Below)


Other Female  Surgeries/Procedures: bladder suspension


Endocrine Surgical History: Reports: None


Neurological Surgical History: Reports: None


Musculoskeletal Surgical History: Reports: None, Knee Replacement


Other Musculoskeletal Surgeries/Procedures:: right partial knee 6/15/20


Oncologic Surgical History: Reports: None


Other Oncologic Surgeries/Procedures: hysterectomy


Dermatological Surgical History: Reports: None





Social & Family History





- Family History


Family Medical History: No Pertinent Family History


Cardiac: Reports: Arrhythmia, Pacemaker, Other (See Below)


Other Cardiac Family History: pacer/defib; valve replacement


Respiratory: Reports: COPD


Musculoskeletal: Reports: Arthritis, Back pain, Chronic, Gout


Endocrine/Metabolic: Reports: Diabetes, type II


Oncologic: Reports: Breast, Cervix, Uterine





- Tobacco Use


Tobacco Use Status *Q: Never Tobacco User


Second Hand Smoke Exposure: No





- Caffeine Use


Caffeine Use: Reports: Coffee, Soda


Caffeine Use Comment: 2 cups coffee.  2-6 diet cokes daily





- Recreational Drug Use


Recreational Drug Use: No





- Living Situation & Occupation


Living situation: Reports:  (Lives with her 80+ mother, and her sister 

is in the next house. Has 1 son and 2 grandchildren , who she is currently 

estranged from her child and grandchildren.)





ED ROS GENERAL





- Review of Systems


Review Of Systems: See Below


Constitutional: Reports: No Symptoms


HEENT: Reports: No Symptoms


Respiratory: Reports: No Symptoms


Cardiovascular: Reports: No Symptoms


Endocrine: Reports: No Symptoms


GI/Abdominal: Reports: Abdominal Pain, Diarrhea, Nausea


: Reports: No Symptoms


Musculoskeletal: Reports: No Symptoms


Skin: Reports: No Symptoms


Neurological: Reports: No Symptoms


Psychiatric: Reports: No Symptoms


Hematologic/Lymphatic: Reports: No Symptoms


Immunologic: Reports: No Symptoms





ED EXAM, GI/ABD





- Physical Exam


Exam: See Below


Exam Limited By: No Limitations


General Appearance: Alert, No Apparent Distress


Ears: Normal External Exam


Nose: Normal Inspection


Throat/Mouth: Normal Inspection


Head: Atraumatic, Normocephalic


Neck: Normal Inspection


Respiratory/Chest: Lungs Clear


Cardiovascular: Regular Rate, Rhythm


GI/Abdominal Exam: Soft, No Distention, Tender (Mild diffuse abdominal 

tenderness)


Back Exam: Normal Inspection


Extremities: Normal Inspection


Neurological: Alert, Oriented


Psychiatric: Normal Affect, Normal Mood


Skin Exam: Warm, Dry





Course





- Vital Signs


Last Recorded V/S: 


                                Last Vital Signs











Temp  36.8 C   21 14:33


 


Pulse  82   21 14:33


 


Resp  16   21 14:33


 


BP  118/57 L  21 14:33


 


Pulse Ox  96   21 14:33














- Orders/Labs/Meds


Orders: 


                               Active Orders 24 hr











 Category Date Time Status


 


 C Diff [CLOS DIFFICILE PCR W/REFLEX] [RM] Stat Lab  21 16:12 Received


 


 CULTURE STOOL + SHIGATOX [RM] Stat Lab  21 16:12 Received


 


 OVA + PARASITE EXAM Stat Lab  21 16:12 Received











Labs: 


                                Laboratory Tests











  21 Range/Units





  15:31 15:31 


 


WBC  7.1   (4.5-11.0)  K/uL


 


RBC  4.10   (3.30-5.50)  M/uL


 


Hgb  11.6 L   (12.0-15.0)  g/dL


 


Hct  35.5 L   (36.0-48.0)  %


 


MCV  87   (80-98)  fL


 


MCH  28   (27-31)  pg


 


MCHC  33   (32-36)  %


 


Plt Count  224   (150-400)  K/uL


 


Neut % (Auto)  72.9 H   (36-66)  %


 


Lymph % (Auto)  18.0 L   (24-44)  %


 


Mono % (Auto)  7.0 H   (2-6)  %


 


Eos % (Auto)  1.7 L   (2-4)  %


 


Baso % (Auto)  0.4   (0-1)  %


 


Sodium   142  (140-148)  mmol/L


 


Potassium   3.4 L  (3.6-5.2)  mmol/L


 


Chloride   105  (100-108)  mmol/L


 


Carbon Dioxide   26  (21-32)  mmol/L


 


Anion Gap   14.4 H  (5.0-14.0)  mmol/L


 


BUN   18  (7-18)  mg/dL


 


Creatinine   0.8  (0.6-1.0)  mg/dL


 


Est Cr Clr Drug Dosing   53.61  mL/min


 


Estimated GFR (MDRD)   > 60  (>60)  


 


Glucose   81  ()  mg/dL


 


Calcium   8.4 L  (8.5-10.1)  mg/dL


 


Magnesium   1.4 L  (1.8-2.4)  mg/dL











Meds: 


Medications














Discontinued Medications














Generic Name Dose Route Start Last Admin





  Trade Name Freq  PRN Reason Stop Dose Admin


 


Loperamide HCl  4 mg  21 15:45  21 15:39





  Loperamide 2 Mg Cap  PO  21 15:46  4 mg





  ONETIME ONE   Administration














- Re-Assessments/Exams


Free Text/Narrative Re-Assessment/Exam: 


65-year-old female presents with concerns of diarrhea.  She was seen in the ED 

for this 4 days ago, labs and CT imaging at that time reassuring.


On exam now she has normal vitals.  She does have mild diffuse abdominal 

tenderness.


She has not been taking loperamide frequently for concerns for possible 

constipation.


She has no recent antibiotic use or high risk features to her diarrhea.  

Nevertheless, we did send an O&P, stool culture, and C. difficile.  These are 

pending and will need to be followed up by the PCP.  Screening labs otherwise 

reassuring.  She was given a dose of loperamide, discussed dietary changes and 

use of Imodium as needed.





21 16:28








Departure





- Departure


Time of Disposition: 16:28


Disposition: Home, Self-Care 01


Clinical Impression: 


Diarrhea


Qualifiers:


 Diarrhea type: unspecified type Qualified Code(s): R19.7 - Diarrhea, unspec

ified








- Discharge Information


*PRESCRIPTION DRUG MONITORING PROGRAM REVIEWED*: No


*COPY OF PRESCRIPTION DRUG MONITORING REPORT IN PATIENT KENISHA: No


Instructions:  Diarrhea, Adult


Referrals: 


Gabriel Schmidt MD [Primary Care Provider] - 


Forms:  ED Department Discharge


Additional Instructions: 


Your labs today are reassuring.  We have some additional studies pending that 

you will need to follow-up with your primary doctor.





You should be taking your Imodium as follows: 2 mg after every episode of 

diarrhea up to 16 mg a day.  This dosing should only be for a few days until you

can see your primary doctor.





Thank you for trusting us to care for you today.





Sepsis Event Note (ED)





- Evaluation


Sepsis Screening Result: No Definite Risk





- Focused Exam


Vital Signs: 


                                   Vital Signs











  Temp Pulse Resp BP Pulse Ox


 


 21 14:33  36.8 C  82  16  118/57 L  96


 


 21 14:08  36.8 C  82  16  118/57 L  96














- My Orders


Last 24 Hours: 


My Active Orders





21 16:12


C Diff [CLOS DIFFICILE PCR W/REFLEX] [RM] Stat 


CULTURE STOOL + SHIGATOX [RM] Stat 


OVA + PARASITE EXAM Stat 














- Assessment/Plan


Last 24 Hours: 


My Active Orders





21 16:12


C Diff [CLOS DIFFICILE PCR W/REFLEX] [RM] Stat 


CULTURE STOOL + SHIGATOX [RM] Stat 


OVA + PARASITE EXAM Stat

## 2021-09-29 ENCOUNTER — HOSPITAL ENCOUNTER (EMERGENCY)
Dept: HOSPITAL 11 - JP.ED | Age: 66
Discharge: HOME | End: 2021-09-29
Payer: MEDICARE

## 2021-09-29 VITALS — DIASTOLIC BLOOD PRESSURE: 69 MMHG | HEART RATE: 52 BPM | SYSTOLIC BLOOD PRESSURE: 116 MMHG

## 2021-09-29 DIAGNOSIS — Z72.0: ICD-10-CM

## 2021-09-29 DIAGNOSIS — Z79.899: ICD-10-CM

## 2021-09-29 DIAGNOSIS — K21.9: ICD-10-CM

## 2021-09-29 DIAGNOSIS — Z88.1: ICD-10-CM

## 2021-09-29 DIAGNOSIS — Z88.8: ICD-10-CM

## 2021-09-29 DIAGNOSIS — E66.9: ICD-10-CM

## 2021-09-29 DIAGNOSIS — Z88.5: ICD-10-CM

## 2021-09-29 DIAGNOSIS — J44.9: ICD-10-CM

## 2021-09-29 DIAGNOSIS — R07.9: Primary | ICD-10-CM

## 2021-09-29 DIAGNOSIS — E03.9: ICD-10-CM

## 2021-09-29 DIAGNOSIS — Z79.82: ICD-10-CM

## 2021-09-30 NOTE — CR
CHEST: Portable 9/29/2021 at 5:33 PM

 

CLINICAL HISTORY:Pain

 

COMPARISON:CT 9/21/2021

 

FINDINGS:  The heart size, pulmonary vascularity and hilar structures are

normal. No infiltrate effusion or pneumothorax is seen. There are

atherosclerotic changes in the aorta.

 

IMPRESSION: No acute cardiopulmonary process.

## 2021-11-26 ENCOUNTER — HOSPITAL ENCOUNTER (EMERGENCY)
Dept: HOSPITAL 11 - JP.ED | Age: 66
Discharge: HOME | End: 2021-11-26
Payer: MEDICARE

## 2021-11-26 VITALS — HEART RATE: 92 BPM | DIASTOLIC BLOOD PRESSURE: 80 MMHG | SYSTOLIC BLOOD PRESSURE: 153 MMHG

## 2021-11-26 DIAGNOSIS — Z79.82: ICD-10-CM

## 2021-11-26 DIAGNOSIS — E66.9: ICD-10-CM

## 2021-11-26 DIAGNOSIS — E03.9: ICD-10-CM

## 2021-11-26 DIAGNOSIS — J44.9: ICD-10-CM

## 2021-11-26 DIAGNOSIS — Z88.1: ICD-10-CM

## 2021-11-26 DIAGNOSIS — S16.1XXA: Primary | ICD-10-CM

## 2021-11-26 DIAGNOSIS — Z79.899: ICD-10-CM
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
## 2021-11-26 DIAGNOSIS — Z88.5: ICD-10-CM

## 2021-11-26 DIAGNOSIS — Z72.0: ICD-10-CM

## 2021-11-26 DIAGNOSIS — K21.9: ICD-10-CM

## 2021-11-26 PROCEDURE — 99283 EMERGENCY DEPT VISIT LOW MDM: CPT

## 2021-11-26 PROCEDURE — 96372 THER/PROPH/DIAG INJ SC/IM: CPT

## 2021-11-26 NOTE — EDM.PDOC
ED HPI GENERAL MEDICAL PROBLEM





- General


Chief Complaint: Neck Problem


Stated Complaint: BODY ACHES


Time Seen by Provider: 21 00:20


Source of Information: Reports: Patient


History Limitations: Reports: No Limitations





- History of Present Illness


INITIAL COMMENTS - FREE TEXT/NARRATIVE: 





66-year-old female woke up 2 mornings ago with some pain in her right neck and 

back into her shoulder, over the past 2 days she has been taking Tylenol but it 

just does getting "progressively worse".  Pain radiates through the shoulder 

into the anterior right upper chest.  It hurts to rotate her head or leaning her

head forward.  No rash, no shortness of breath, no fever or chills, no recent 

trauma.  She has not had this problem in the past.


Onset: Gradual


Duration: Day(s): (Symptoms for the last 2 days)


Location: Reports: Neck, Chest, Upper Extremity, Right


Quality: Reports: Sharp, Stabbing


Worsens with: Reports: Movement


Associated Symptoms: Reports: No Other Symptoms


  ** Neck


Pain Score (Numeric/FACES): 8





- Related Data


                                    Allergies











Allergy/AdvReac Type Severity Reaction Status Date / Time


 


ciprofloxacin Allergy Intermediate Hives Verified 21 00:16


 


clonidine Allergy Intermediate Other Verified 21 00:16


 


hydromorphone Allergy Intermediate Rash Verified 21 00:16


 


oxycodone [From Percocet] Allergy Intermediate Hives Verified 21 00:16











Home Meds: 


                                    Home Meds





Levothyroxine Sodium [Synthroid] 25 mcg PO .ACBREAKFAST 01/29/15 [History]


ondansetron HCL [Zofran] 8 mg PO Q8H PRN 05/21/15 [History]


Furosemide 40 mg PO DAILY PRN 17 [History]


Sertraline HCl [Zoloft] 150 mg PO DAILY 17 [History]


traZODone 50 - 100 mg PO BEDTIME PRN 17 [History]


ClonazePAM [KlonoPIN] 0.5 mg PO DAILY PRN 17 [History]


Penciclovir [Denavir 1% Crm] 1 applic TOP ASDIRECTED 17 [History]


tiZANidine [Zanaflex] 4 mg PO Q8H PRN 18 [History]


Pantoprazole [ProTONIX***] 40 mg PO DAILY 19 [History]


Albuterol [Proventil Neb Soln] 3 ml NEB Q4H PRN 20 [History]


Cetirizine [ZyrTEC] 10 mg PO DAILY 20 [History]


Cholecalciferol (Vitamin D3) [Vitamin D3] 2,000 unit PO DAILY 20 [History]


Cyanocobalamin (Vitamin B-12) [B-12] 500 mcg PO DAILY 20 [History]


Gabapentin [Neurontin] 300 mg PO BEDTIME 20 [History]


Varenicline [Chantix] 1 mg PO BID 20 [History]


Albuterol [Ventolin HFA] 2 puff INH Q6H PRN 20 [History]


Fluticasone Propionate [Flonase] 2 spr NASBOTH DAILY PRN 20 [History]


Homeopathic Products (Leg Cramp Complex Or) 1 - 2 tab PO BEDTIME 20 

[History]


Umeclidinium Brm/Vilanterol Tr [Anoro Ellipta 62.5-25 MCG] 1 each INH DAILY 

20 [History]


Aspirin [Halfprin] 81 mg PO DAILY 12/15/20 [History]


Docusate Sodium [Colace] 100 mg PO BID #60 cap 21 [Rx]











Past Medical History


HEENT History: Reports: Allergic Rhinitis, Impaired Vision


Other HEENT History: wears glasses


Cardiovascular History: Reports: Cardiomyopathy, Heart Murmur, SOB on Exertion


Respiratory History: Reports: Bronchitis, Recurrent, COPD, Pneumonia, Recurrent,

 Sleep Apnea, SOB


Gastrointestinal History: Reports: Bowel Obstruction, Cholelithiasis, Chronic 

Diarrhea, Diverticulosis, GERD, Hiatal Hernia


Genitourinary History: Reports: UTI, Recurrent, Other (See Below)


Other Genitourinary History: bladder suspension


OB/GYN History: Reports: Pregnancy, Spontaneous 


Musculoskeletal History: Reports: Arthritis, Back Pain, Chronic, Fibromyalgia, 

Other (See Below)


Other Musculoskeletal History: right knee pain.  ; wound ulcer right lower leg


Neurological History: Reports: Vertigo


Psychiatric History: Reports: Anxiety, Depression, Suicidal Ideation


Endocrine/Metabolic History: Reports: Hypothyroidism, Obesity/BMI 30+


Hematologic History: Reports: Anticoagulation Therapy


Immunologic History: Reports: None


Oncologic (Cancer) History: Reports: Cervix, Uterine


Other Oncologic History: complete hysterectomy 2015


Dermatologic History: Reports: Cellulitis, Other (See Below)


Other Dermatologic History: sore on right lower leg that won't go away; [facility] debrid 2020





- Infectious Disease History


Infectious Disease History: Reports: Chicken Pox, MRSA, Other (See Below)


Other Infectious Disease History: MRSA in right leg in 2019





- Past Surgical History


Head Surgeries/Procedures: Reports: None


HEENT Surgical History: Reports: Other (See Below)


Other HEENT Surgeries/Procedures: biopsy on tongue


Cardiovascular Surgical History: Reports: Varicose, Other (See Below)


Other Cardiovascular Surgeries/Procedures: RFA leg veins


Respiratory Surgical History: Reports: None


GI Surgical History: Reports: Appendectomy, Cholecystectomy, Colon, Colonoscopy,

 EGD, Hernia Repair/Other


Female  Surgical History: Reports: Hysterectomy, Salpingo-Oophorectomy, Other 

(See Below)


Other Female  Surgeries/Procedures: bladder suspension


Endocrine Surgical History: Reports: None


Neurological Surgical History: Reports: None


Musculoskeletal Surgical History: Reports: None, Knee Replacement


Other Musculoskeletal Surgeries/Procedures:: right partial knee 6/15/20


Oncologic Surgical History: Reports: None


Other Oncologic Surgeries/Procedures: hysterectomy


Dermatological Surgical History: Reports: None





Social & Family History





- Family History


Family Medical History: No Pertinent Family History


Cardiac: Reports: Arrhythmia, Pacemaker, Other (See Below)


Other Cardiac Family History: pacer/defib; valve replacement


Respiratory: Reports: COPD


Musculoskeletal: Reports: Arthritis, Back pain, Chronic, Gout


Endocrine/Metabolic: Reports: Diabetes, type II


Oncologic: Reports: Breast, Cervix, Uterine





- Tobacco Use


Tobacco Use Status *Q: Current Every Day Tobacco User


Years of Tobacco use: 50


Packs/Tins Daily: 0.7





- Caffeine Use


Caffeine Use: Reports: Coffee


Caffeine Use Comment: 2 cups coffee.  2-6 diet cokes daily





- Recreational Drug Use


Recreational Drug Use: No





- Living Situation & Occupation


Living situation: Reports:  (Lives with her 80+ mother, and her sister 

is in the next house. Has 1 son and 2 grandchildren , who she is currently 

estranged from her child and grandchildren.)





ED ROS GENERAL





- Review of Systems


Review Of Systems: See Below


Constitutional: Denies: Fever, Chills, Malaise


HEENT: Denies: Vision Change


Respiratory: Denies: Shortness of Breath, Pleuritic Chest Pain


Cardiovascular: Reports: Chest Pain (Some extreme right upper chest pain around 

the clavicle continuous with the neck and shoulder pain)


GI/Abdominal: Reports: Abdominal Pain (Chronic abdominal pain from hernias of 

her abdomen)


: Reports: No Symptoms


Musculoskeletal: Reports: Other (See HPI)


Skin: Reports: No Symptoms.  Denies: Rash


Neurological: Denies: Paresthesia (No numbness of the right arm)





ED EXAM, UPPER BACK/NECK PAIN





- Physical Exam


Exam: See Below


Exam Limited By: No Limitations


General Appearance: Alert, Anxious, Mild Distress, Other (Tearful, looks 

uncomfortable)


Head Exam: Atraumatic


Neck Exam: Other (Very point tender to palpation along the paracervical muscle 

on the right neck, especially into the trapezius, supraclavicular and 

subclavicular area of the right shoulder.  Pain is increased with lateral 

bending of the head to rotation)


Cardiovascular/Respiratory: Regular Rate, Rhythm


Neurologic: No Motor/Sensory Deficits, Alert, Normal Mood/Affect, Oriented x 3


Psychiatric: Anxious


Skin Exam: Normal Color, Warm/Dry, Other (No rash over sore area)





Course





- Vital Signs


Last Recorded V/S: 


                                Last Vital Signs











Temp  98.2 F   21 00:10


 


Pulse  92   21 00:10


 


Resp  20   21 00:10


 


BP  153/80 H  21 00:10


 


Pulse Ox  93 L  21 00:10














- Orders/Labs/Meds


Meds: 


Medications














Discontinued Medications














Generic Name Dose Route Start Last Admin





  Trade Name Guerda  PRN Reason Stop Dose Admin


 


Ketorolac Tromethamine  30 mg  21 00:33  21 00:38





  Ketorolac 30 Mg/Ml Sdv  IM  21 00:34  30 mg





  ONETIME ONE   Administration














- Re-Assessments/Exams


Free Text/Narrative Re-Assessment/Exam: 





21 00:42


Patient has a localized inflammatory problem with soft tissue or muscle of the 

right neck and shoulder.  She was given 30 mg of IM Toradol, and will be 

discharged with 10 additional doses as well as 10 doses of hydrocodone for extra

 pain control.  Warm compresses to the area and gentle stretching and range of 

motion may be helpful, she can recheck at the clinic for a physical therapy 

consult if not improving satisfactorily.





Departure





- Departure


Time of Disposition: 00:46


Disposition: Home, Self-Care 01


Clinical Impression: 


Strain of neck


Qualifiers:


 Encounter type: initial encounter Qualified Code(s): S16.1XXA - Strain of 

muscle, fascia and tendon at neck level, initial encounter








- Discharge Information


Instructions:  Muscle Strain


Referrals: 


Gabriel Schmidt MD [Primary Care Provider] - 


Forms:  ED Department Discharge


Care Plan Goals: 


Warm compresses or heating pad may be helpful, gentle stretching and range of 

motion to keep the area loose will help with healing.  Take 1 Toradol every 6 

hours until gone, and add hydrocodone for stronger pain control if needed.  

Recheck at the clinic for physical therapy if not improving in the next few 

days.





Sepsis Event Note (ED)





- Evaluation


Sepsis Screening Result: No Definite Risk

## 2022-01-18 ENCOUNTER — HOSPITAL ENCOUNTER (INPATIENT)
Dept: HOSPITAL 11 - JP.SDSSCHI | Age: 67
LOS: 6 days | Discharge: HOME | DRG: 345 | End: 2022-01-24
Attending: SURGERY | Admitting: SURGERY
Payer: MEDICARE

## 2022-01-18 DIAGNOSIS — Z90.49: ICD-10-CM

## 2022-01-18 DIAGNOSIS — K43.0: Primary | ICD-10-CM

## 2022-01-18 DIAGNOSIS — K66.0: ICD-10-CM

## 2022-01-18 DIAGNOSIS — Z79.899: ICD-10-CM

## 2022-01-18 DIAGNOSIS — H54.7: ICD-10-CM

## 2022-01-18 DIAGNOSIS — Z88.5: ICD-10-CM

## 2022-01-18 DIAGNOSIS — E55.9: ICD-10-CM

## 2022-01-18 DIAGNOSIS — K65.4: ICD-10-CM

## 2022-01-18 DIAGNOSIS — K66.8: ICD-10-CM

## 2022-01-18 DIAGNOSIS — G89.29: ICD-10-CM

## 2022-01-18 DIAGNOSIS — M54.50: ICD-10-CM

## 2022-01-18 DIAGNOSIS — E53.8: ICD-10-CM

## 2022-01-18 DIAGNOSIS — G47.33: ICD-10-CM

## 2022-01-18 DIAGNOSIS — K21.9: ICD-10-CM

## 2022-01-18 DIAGNOSIS — Z88.1: ICD-10-CM

## 2022-01-18 DIAGNOSIS — J44.9: ICD-10-CM

## 2022-01-18 DIAGNOSIS — K57.90: ICD-10-CM

## 2022-01-18 DIAGNOSIS — J02.9: ICD-10-CM

## 2022-01-18 DIAGNOSIS — E66.9: ICD-10-CM

## 2022-01-18 PROCEDURE — C1713 ANCHOR/SCREW BN/BN,TIS/BN: HCPCS

## 2022-01-18 PROCEDURE — 0WUF0JZ SUPPLEMENT ABDOMINAL WALL WITH SYNTHETIC SUBSTITUTE, OPEN APPROACH: ICD-10-PCS | Performed by: SURGERY

## 2022-01-18 PROCEDURE — C1781 MESH (IMPLANTABLE): HCPCS

## 2022-01-18 PROCEDURE — 0TQB0ZZ REPAIR BLADDER, OPEN APPROACH: ICD-10-PCS | Performed by: SURGERY

## 2022-01-18 PROCEDURE — 0DBW0ZZ EXCISION OF PERITONEUM, OPEN APPROACH: ICD-10-PCS | Performed by: SURGERY

## 2022-01-18 PROCEDURE — 3E0M05Z INTRODUCTION OF ADHESION BARRIER INTO PERITONEAL CAVITY, OPEN APPROACH: ICD-10-PCS | Performed by: SURGERY

## 2022-01-18 PROCEDURE — C9113 INJ PANTOPRAZOLE SODIUM, VIA: HCPCS

## 2022-01-18 RX ADMIN — FENTANYL CITRATE PRN MCG: 50 INJECTION, SOLUTION INTRAMUSCULAR; INTRAVENOUS at 08:40

## 2022-01-19 RX ADMIN — CYANOCOBALAMIN TAB 1000 MCG SCH MCG: 1000 TAB at 08:09

## 2022-01-19 RX ADMIN — ONDANSETRON PRN MG: 2 INJECTION, SOLUTION INTRAMUSCULAR; INTRAVENOUS at 18:26

## 2022-01-19 RX ADMIN — Medication SCH: at 08:09

## 2022-01-19 RX ADMIN — TIOTROPIUM BROMIDE AND OLODATEROL SCH GM: 3.124; 2.736 SPRAY, METERED RESPIRATORY (INHALATION) at 07:01

## 2022-01-20 RX ADMIN — ONDANSETRON PRN MG: 2 INJECTION, SOLUTION INTRAMUSCULAR; INTRAVENOUS at 14:02

## 2022-01-20 RX ADMIN — CYANOCOBALAMIN TAB 1000 MCG SCH MCG: 1000 TAB at 08:04

## 2022-01-20 RX ADMIN — TIOTROPIUM BROMIDE AND OLODATEROL SCH GM: 3.124; 2.736 SPRAY, METERED RESPIRATORY (INHALATION) at 07:15

## 2022-01-20 RX ADMIN — Medication SCH: at 08:03

## 2022-01-20 RX ADMIN — ONDANSETRON PRN MG: 2 INJECTION, SOLUTION INTRAMUSCULAR; INTRAVENOUS at 08:15

## 2022-01-20 RX ADMIN — FENTANYL CITRATE PRN MCG: 50 INJECTION, SOLUTION INTRAMUSCULAR; INTRAVENOUS at 06:03

## 2022-01-21 RX ADMIN — HYDROCODONE BITARTRATE AND ACETAMINOPHEN PRN TAB: 5; 325 TABLET ORAL at 10:34

## 2022-01-21 RX ADMIN — CYANOCOBALAMIN TAB 1000 MCG SCH MCG: 1000 TAB at 09:09

## 2022-01-21 RX ADMIN — HYDROCODONE BITARTRATE AND ACETAMINOPHEN PRN TAB: 5; 325 TABLET ORAL at 20:59

## 2022-01-21 RX ADMIN — TIOTROPIUM BROMIDE AND OLODATEROL SCH GM: 3.124; 2.736 SPRAY, METERED RESPIRATORY (INHALATION) at 07:12

## 2022-01-21 RX ADMIN — HYDROCODONE BITARTRATE AND ACETAMINOPHEN PRN TAB: 5; 325 TABLET ORAL at 16:13

## 2022-01-22 RX ADMIN — TIOTROPIUM BROMIDE AND OLODATEROL SCH GM: 3.124; 2.736 SPRAY, METERED RESPIRATORY (INHALATION) at 06:59

## 2022-01-22 RX ADMIN — HYDROCODONE BITARTRATE AND ACETAMINOPHEN PRN TAB: 5; 325 TABLET ORAL at 11:00

## 2022-01-22 RX ADMIN — CYANOCOBALAMIN TAB 1000 MCG SCH MCG: 1000 TAB at 08:34

## 2022-01-23 RX ADMIN — TIOTROPIUM BROMIDE AND OLODATEROL SCH GM: 3.124; 2.736 SPRAY, METERED RESPIRATORY (INHALATION) at 07:02

## 2022-01-23 RX ADMIN — ONDANSETRON PRN MG: 2 INJECTION, SOLUTION INTRAMUSCULAR; INTRAVENOUS at 09:27

## 2022-01-23 RX ADMIN — CYANOCOBALAMIN TAB 1000 MCG SCH MCG: 1000 TAB at 09:11

## 2022-01-24 VITALS — HEART RATE: 51 BPM | SYSTOLIC BLOOD PRESSURE: 109 MMHG | DIASTOLIC BLOOD PRESSURE: 43 MMHG

## 2022-01-24 RX ADMIN — CYANOCOBALAMIN TAB 1000 MCG SCH MCG: 1000 TAB at 08:14

## 2022-01-24 RX ADMIN — TIOTROPIUM BROMIDE AND OLODATEROL SCH GM: 3.124; 2.736 SPRAY, METERED RESPIRATORY (INHALATION) at 07:12

## 2022-05-21 ENCOUNTER — HOSPITAL ENCOUNTER (INPATIENT)
Dept: HOSPITAL 11 - JP.ED | Age: 67
LOS: 3 days | Discharge: HOME | DRG: 377 | End: 2022-05-24
Attending: INTERNAL MEDICINE | Admitting: INTERNAL MEDICINE
Payer: MEDICARE

## 2022-05-21 DIAGNOSIS — M79.7: ICD-10-CM

## 2022-05-21 DIAGNOSIS — K57.31: ICD-10-CM

## 2022-05-21 DIAGNOSIS — Z95.5: ICD-10-CM

## 2022-05-21 DIAGNOSIS — Z85.42: ICD-10-CM

## 2022-05-21 DIAGNOSIS — Z79.899: ICD-10-CM

## 2022-05-21 DIAGNOSIS — G47.30: ICD-10-CM

## 2022-05-21 DIAGNOSIS — K62.5: ICD-10-CM

## 2022-05-21 DIAGNOSIS — Z88.5: ICD-10-CM

## 2022-05-21 DIAGNOSIS — H54.7: ICD-10-CM

## 2022-05-21 DIAGNOSIS — F17.210: ICD-10-CM

## 2022-05-21 DIAGNOSIS — K52.9: ICD-10-CM

## 2022-05-21 DIAGNOSIS — D62: ICD-10-CM

## 2022-05-21 DIAGNOSIS — Z79.890: ICD-10-CM

## 2022-05-21 DIAGNOSIS — I42.2: ICD-10-CM

## 2022-05-21 DIAGNOSIS — F32.A: ICD-10-CM

## 2022-05-21 DIAGNOSIS — Z85.41: ICD-10-CM

## 2022-05-21 DIAGNOSIS — F41.9: ICD-10-CM

## 2022-05-21 DIAGNOSIS — Z79.02: ICD-10-CM

## 2022-05-21 DIAGNOSIS — E03.9: ICD-10-CM

## 2022-05-21 DIAGNOSIS — M19.90: ICD-10-CM

## 2022-05-21 DIAGNOSIS — Z88.8: ICD-10-CM

## 2022-05-21 DIAGNOSIS — Z79.82: ICD-10-CM

## 2022-05-21 DIAGNOSIS — I42.9: ICD-10-CM

## 2022-05-21 DIAGNOSIS — K21.9: ICD-10-CM

## 2022-05-21 DIAGNOSIS — K44.9: ICD-10-CM

## 2022-05-21 DIAGNOSIS — Z88.1: ICD-10-CM

## 2022-05-21 DIAGNOSIS — U07.1: ICD-10-CM

## 2022-05-21 DIAGNOSIS — K92.2: Primary | ICD-10-CM

## 2022-05-21 DIAGNOSIS — J44.9: ICD-10-CM

## 2022-05-21 PROCEDURE — P9016 RBC LEUKOCYTES REDUCED: HCPCS

## 2022-05-22 LAB — SARS-COV-2 RNA RESP QL NAA+PROBE: POSITIVE

## 2022-05-22 PROCEDURE — 30233N1 TRANSFUSION OF NONAUTOLOGOUS RED BLOOD CELLS INTO PERIPHERAL VEIN, PERCUTANEOUS APPROACH: ICD-10-PCS | Performed by: INTERNAL MEDICINE

## 2022-05-22 PROCEDURE — 8E0ZXY6 ISOLATION: ICD-10-PCS | Performed by: INTERNAL MEDICINE

## 2022-05-22 RX ADMIN — SODIUM CHLORIDE AND POTASSIUM CHLORIDE SCH MLS/HR: .9; .15 SOLUTION INTRAVENOUS at 23:35

## 2022-05-23 PROCEDURE — 0DJD8ZZ INSPECTION OF LOWER INTESTINAL TRACT, VIA NATURAL OR ARTIFICIAL OPENING ENDOSCOPIC: ICD-10-PCS | Performed by: SURGERY

## 2022-05-23 RX ADMIN — CYANOCOBALAMIN TAB 1000 MCG SCH MCG: 1000 TAB at 11:16

## 2022-05-23 RX ADMIN — TIOTROPIUM BROMIDE AND OLODATEROL SCH GM: 3.124; 2.736 SPRAY, METERED RESPIRATORY (INHALATION) at 08:55

## 2022-05-23 RX ADMIN — VITAMIN D, TAB 1000IU (100/BT) SCH MCG: 25 TAB at 11:16

## 2022-05-23 RX ADMIN — SODIUM CHLORIDE AND POTASSIUM CHLORIDE SCH MLS/HR: .9; .15 SOLUTION INTRAVENOUS at 10:07

## 2022-05-24 VITALS — HEART RATE: 70 BPM

## 2022-05-24 VITALS — SYSTOLIC BLOOD PRESSURE: 134 MMHG | DIASTOLIC BLOOD PRESSURE: 70 MMHG

## 2022-05-24 RX ADMIN — CYANOCOBALAMIN TAB 1000 MCG SCH MCG: 1000 TAB at 09:06

## 2022-05-24 RX ADMIN — TIOTROPIUM BROMIDE AND OLODATEROL SCH GM: 3.124; 2.736 SPRAY, METERED RESPIRATORY (INHALATION) at 07:21

## 2022-05-24 RX ADMIN — VITAMIN D, TAB 1000IU (100/BT) SCH MCG: 25 TAB at 09:06

## 2022-08-21 ENCOUNTER — HOSPITAL ENCOUNTER (EMERGENCY)
Dept: HOSPITAL 11 - JP.ED | Age: 67
Discharge: HOME | End: 2022-08-21
Payer: MEDICARE

## 2022-08-21 VITALS — HEART RATE: 70 BPM | SYSTOLIC BLOOD PRESSURE: 132 MMHG | DIASTOLIC BLOOD PRESSURE: 66 MMHG

## 2022-08-21 DIAGNOSIS — Z90.710: ICD-10-CM

## 2022-08-21 DIAGNOSIS — J44.9: ICD-10-CM

## 2022-08-21 DIAGNOSIS — Z88.8: ICD-10-CM

## 2022-08-21 DIAGNOSIS — E66.9: ICD-10-CM

## 2022-08-21 DIAGNOSIS — K62.5: Primary | ICD-10-CM

## 2022-08-21 DIAGNOSIS — Z79.899: ICD-10-CM

## 2022-08-21 DIAGNOSIS — Z90.49: ICD-10-CM

## 2022-08-21 DIAGNOSIS — F17.210: ICD-10-CM

## 2022-08-21 DIAGNOSIS — Z88.6: ICD-10-CM

## 2022-08-21 DIAGNOSIS — Z88.1: ICD-10-CM

## 2022-08-21 DIAGNOSIS — Z86.16: ICD-10-CM

## 2022-08-21 DIAGNOSIS — Z88.5: ICD-10-CM

## 2022-08-21 PROCEDURE — 99283 EMERGENCY DEPT VISIT LOW MDM: CPT

## 2022-08-21 PROCEDURE — 80053 COMPREHEN METABOLIC PANEL: CPT

## 2022-08-21 PROCEDURE — 83690 ASSAY OF LIPASE: CPT

## 2022-08-21 PROCEDURE — 96360 HYDRATION IV INFUSION INIT: CPT

## 2022-08-21 PROCEDURE — 36415 COLL VENOUS BLD VENIPUNCTURE: CPT

## 2022-08-21 PROCEDURE — 85025 COMPLETE CBC W/AUTO DIFF WBC: CPT

## 2023-04-23 ENCOUNTER — HOSPITAL ENCOUNTER (EMERGENCY)
Dept: HOSPITAL 11 - JP.ED | Age: 68
Discharge: HOME | End: 2023-04-23
Payer: MEDICARE

## 2023-04-23 VITALS — DIASTOLIC BLOOD PRESSURE: 54 MMHG | HEART RATE: 72 BPM | SYSTOLIC BLOOD PRESSURE: 137 MMHG

## 2023-04-23 DIAGNOSIS — Z88.8: ICD-10-CM

## 2023-04-23 DIAGNOSIS — E03.9: ICD-10-CM

## 2023-04-23 DIAGNOSIS — Z88.1: ICD-10-CM

## 2023-04-23 DIAGNOSIS — E66.9: ICD-10-CM

## 2023-04-23 DIAGNOSIS — Z79.01: ICD-10-CM

## 2023-04-23 DIAGNOSIS — Z79.51: ICD-10-CM

## 2023-04-23 DIAGNOSIS — Z88.5: ICD-10-CM

## 2023-04-23 DIAGNOSIS — K21.9: ICD-10-CM

## 2023-04-23 DIAGNOSIS — J44.9: ICD-10-CM

## 2023-04-23 DIAGNOSIS — F17.210: ICD-10-CM

## 2023-04-23 DIAGNOSIS — Z86.16: ICD-10-CM

## 2023-04-23 DIAGNOSIS — Z79.899: ICD-10-CM

## 2023-04-23 DIAGNOSIS — J30.89: Primary | ICD-10-CM

## 2023-04-23 PROCEDURE — 96372 THER/PROPH/DIAG INJ SC/IM: CPT

## 2023-04-23 PROCEDURE — 99284 EMERGENCY DEPT VISIT MOD MDM: CPT

## 2023-05-14 ENCOUNTER — HOSPITAL ENCOUNTER (EMERGENCY)
Dept: HOSPITAL 11 - JP.ED | Age: 68
LOS: 1 days | Discharge: HOME | End: 2023-05-15
Payer: MEDICARE

## 2023-05-14 DIAGNOSIS — J44.9: ICD-10-CM

## 2023-05-14 DIAGNOSIS — E78.00: ICD-10-CM

## 2023-05-14 DIAGNOSIS — Z88.1: ICD-10-CM

## 2023-05-14 DIAGNOSIS — Z88.5: ICD-10-CM

## 2023-05-14 DIAGNOSIS — M62.838: Primary | ICD-10-CM

## 2023-05-14 DIAGNOSIS — E66.9: ICD-10-CM

## 2023-05-14 DIAGNOSIS — E03.9: ICD-10-CM

## 2023-05-14 DIAGNOSIS — F17.210: ICD-10-CM

## 2023-05-14 PROCEDURE — 99283 EMERGENCY DEPT VISIT LOW MDM: CPT

## 2023-05-14 PROCEDURE — 96372 THER/PROPH/DIAG INJ SC/IM: CPT

## 2023-05-15 VITALS — HEART RATE: 58 BPM | SYSTOLIC BLOOD PRESSURE: 119 MMHG | DIASTOLIC BLOOD PRESSURE: 60 MMHG

## 2024-02-02 ENCOUNTER — HOSPITAL ENCOUNTER (EMERGENCY)
Dept: HOSPITAL 11 - JP.ED | Age: 69
Discharge: HOME | End: 2024-02-02
Payer: MEDICARE

## 2024-02-02 VITALS — HEART RATE: 56 BPM | DIASTOLIC BLOOD PRESSURE: 66 MMHG | SYSTOLIC BLOOD PRESSURE: 113 MMHG

## 2024-02-02 DIAGNOSIS — Z90.49: ICD-10-CM

## 2024-02-02 DIAGNOSIS — E66.9: ICD-10-CM

## 2024-02-02 DIAGNOSIS — I10: ICD-10-CM

## 2024-02-02 DIAGNOSIS — F17.210: ICD-10-CM

## 2024-02-02 DIAGNOSIS — Z88.8: ICD-10-CM

## 2024-02-02 DIAGNOSIS — Z90.710: ICD-10-CM

## 2024-02-02 DIAGNOSIS — E78.00: ICD-10-CM

## 2024-02-02 DIAGNOSIS — K21.9: ICD-10-CM

## 2024-02-02 DIAGNOSIS — K59.00: Primary | ICD-10-CM

## 2024-02-02 DIAGNOSIS — E03.9: ICD-10-CM

## 2024-02-02 DIAGNOSIS — J44.9: ICD-10-CM

## 2024-02-02 DIAGNOSIS — Z79.899: ICD-10-CM

## 2024-02-02 LAB
ALBUMIN SERPL-MCNC: 3.2 G/DL (ref 3.4–5)
ALBUMIN/GLOB SERPL: 0.8 {RATIO} (ref 1.2–2.2)
ALP SERPL-CCNC: 74 U/L (ref 46–116)
ALT SERPL-CCNC: 16 U/L (ref 12–78)
ANION GAP SERPL CALC-SCNC: 12.7 MMOL/L (ref 5–14)
APPEARANCE UR: CLEAR
AST SERPL-CCNC: 16 U/L (ref 15–37)
BACTERIA URNS QL MICRO: (no result)
BASOPHILS # BLD AUTO: 0.05 K/UL (ref 0–0.1)
BASOPHILS NFR BLD AUTO: 0.5 % (ref 0.1–1.3)
BILIRUB SERPL-MCNC: 0.6 MG/DL (ref 0.2–1)
BILIRUB UR STRIP-MCNC: NEGATIVE MG/DL
BUN SERPL-MCNC: 15 MG/DL (ref 7–18)
CALCIUM SERPL-MCNC: 8.4 MG/DL (ref 8.5–10.1)
CHLORIDE SERPL-SCNC: 101 MMOL/L (ref 100–108)
CO2 SERPL-SCNC: 29 MMOL/L (ref 21–32)
COLOR UR: YELLOW
CREAT CL 24H UR+SERPL-VRATE: 54.58 ML/MIN
CREAT SERPL-MCNC: 0.9 MG/DL (ref 0.6–1)
CRP SERPL-MCNC: 0.61 MG/DL (ref ?–0.5)
EOSINOPHIL # BLD AUTO: 0.16 K/UL (ref 0–0.4)
EOSINOPHIL NFR BLD AUTO: 1.5 % (ref 0–5.4)
EPI CELLS #/AREA URNS HPF: (no result) /[HPF]
GLOBULIN SER-MCNC: 3.8 G/DL (ref 2.3–3.5)
GLUCOSE SERPL-MCNC: 78 MG/DL (ref 74–106)
GLUCOSE UR STRIP-MCNC: NEGATIVE MG/DL
HCT VFR BLD AUTO: 42.8 % (ref 34.3–46)
HGB BLD-MCNC: 14.4 G/DL (ref 11.2–15.5)
IMM GRANULOCYTES # BLD: 0.03 K/UL (ref 0–0.23)
IMM GRANULOCYTES NFR BLD: 0.3 % (ref 0–0.7)
KETONES UR STRIP-MCNC: NEGATIVE MG/DL
LYMPHOCYTES # BLD AUTO: 2.1 K/UL (ref 0.8–3.3)
LYMPHOCYTES NFR BLD AUTO: 19.2 % (ref 11.4–47.7)
MCH RBC QN AUTO: 31.1 PG (ref 31.6–35.5)
MCHC RBC AUTO-ENTMCNC: 33.6 G/DL (ref 31.6–35.5)
MCHC RBC AUTO-ENTMCNC: 92.4 FL (ref 81.4–99)
MONOCYTES # BLD AUTO: 0.61 K/UL (ref 0.2–0.9)
MONOCYTES NFR BLD AUTO: 5.6 % (ref 3.3–12.6)
MUCOUS THREADS URNS QL MICRO: (no result)
NEUTROPHILS # BLD AUTO: 7.98 K/UL (ref 1–7.6)
NEUTROPHILS NFR BLD AUTO: 72.9 % (ref 40–78.1)
NITRITE UR QL: NEGATIVE
PH UR STRIP: 5.5 [PH] (ref 5–8)
PLATELET # BLD AUTO: 203 K/UL (ref 130–375)
POTASSIUM SERPL-SCNC: 4.7 MMOL/L (ref 3.6–5.2)
PROT SERPL-MCNC: 7 G/DL (ref 6.4–8.2)
PROT UR STRIP-MCNC: NEGATIVE MG/DL
RBC # BLD AUTO: 4.63 M/UL (ref 3.77–5.24)
RBC # URNS HPF: (no result) /ML (ref 0–5)
RBC UR QL: (no result)
SODIUM SERPL-SCNC: 138 MMOL/L (ref 140–148)
SP GR UR STRIP: 1.01 (ref 1.01–1.03)
UROBILINOGEN UR STRIP-ACNC: 0.2 EU/DL (ref 0.2–1)
WBC # BLD AUTO: 10.9 K/UL (ref 3.2–11)
WBC UR QL: (no result) (ref 0–5)

## 2024-02-02 PROCEDURE — 36415 COLL VENOUS BLD VENIPUNCTURE: CPT

## 2024-02-02 PROCEDURE — 83605 ASSAY OF LACTIC ACID: CPT

## 2024-02-02 PROCEDURE — 85025 COMPLETE CBC W/AUTO DIFF WBC: CPT

## 2024-02-02 PROCEDURE — 81001 URINALYSIS AUTO W/SCOPE: CPT

## 2024-02-02 PROCEDURE — 80053 COMPREHEN METABOLIC PANEL: CPT

## 2024-02-02 PROCEDURE — 99284 EMERGENCY DEPT VISIT MOD MDM: CPT

## 2024-02-02 PROCEDURE — 86140 C-REACTIVE PROTEIN: CPT

## 2024-02-02 PROCEDURE — 74177 CT ABD & PELVIS W/CONTRAST: CPT

## 2024-02-02 RX ADMIN — IOPAMIDOL SCH ML: 612 INJECTION, SOLUTION INTRAVENOUS at 17:28

## 2024-02-02 RX ADMIN — MAGNESIUM CITRATE ONE ML: 1.75 LIQUID ORAL at 19:06

## 2024-02-02 RX ADMIN — Medication PRN ML: at 17:28

## 2024-03-23 NOTE — EDM.PDOC
ED HPI GENERAL MEDICAL PROBLEM





- General


Chief Complaint: Wound Recheck


Stated Complaint: LOWER LEFT LEG PAIN


Time Seen by Provider: 19 01:00


Source of Information: Reports: Patient


History Limitations: Reports: No Limitations





- History of Present Illness


INITIAL COMMENTS - FREE TEXT/NARRATIVE: 





64-year-old female with chronic worsening ulcerative inflamed lesion on her 

right leg.  States she saw Dr. Christine yesterday and was started on 

Augmentin.  She has taken 2 doses but is concerned that the inflammation seems 

to be worsening and she has more discomfort.  No fevers or chills.


Onset: Gradual


Duration: Chronic


Location: Reports: Lower Extremity, Right


Associated Symptoms: Reports: Malaise.  Denies: Confusion, Fever/Chills, Loss 

of Appetite, Nausea/Vomiting


  ** Right Leg


Pain Score (Numeric/FACES): 6





- Related Data


 Allergies











Allergy/AdvReac Type Severity Reaction Status Date / Time


 


ciprofloxacin [From Cipro] Allergy  Hives Verified 19 23:43


 


clonidine HCl [From Catapres] Allergy  Blisters Verified 19 23:43











Home Meds: 


 Home Meds





Levothyroxine Sodium [Synthroid] 25 mcg PO DAILY 01/29/15 [History]


Pantoprazole [ProTONIX Granules***] 40 mg PO BID 01/29/15 [History]


Albuterol Sulfate [Albuterol Sulfate HFA] 2 puff INH Q6H PRN 05/21/15 [History]


Ondansetron HCl [Zofran] 4 mg PO Q4H PRN 05/21/15 [History]


Loratadine 10 mg PO DAILY 16 [History]


buPROPion [Wellbutrin] 150 mg PO BID 09/10/16 [History]


Furosemide 40 mg PO DAILY PRN 17 [History]


Triamcinolone Acetonide [Kenalog 0.1% Crm] 1 applic TOP TID 17 [History]


Albuterol/Ipratropium [DuoNeb 3.0-0.5 MG/3 ML] 3 ml IH Q4H PRN 07/10/17 [History

]


Magnesium Oxide [Magnesium] 250 mg PO ASDIRECTED 17 [History]


Sertraline HCl [Zoloft] 50 mg PO DAILY 17 [History]


lamoTRIgine [Lamotrigine] 150 mg PO DAILY 17 [History]


traZODone 1 - 2 tab PO BEDTIME PRN 17 [History]


ClonazePAM [KlonoPIN] 0.5 mg PO ASDIRECTED PRN 17 [History]


Penciclovir [Denavir 1% Crm] 1.5 gm TP ASDIRECTED 17 [History]


Mometasone Furoate [Elocon 0.1% Crm] 1 applic TOP DAILY 18 [History]


tiZANidine [Zanaflex] 4 mg PO Q8H PRN 18 [History]


Hydrocodone/Acetaminophen [Norco 5-325 Tablet] 1 each PO Q6HR PRN #28 tablet  [Rx]


cephALEXin [Keflex] 500 mg PO QID 19 [History]


Amoxicillin/Potassium Clav [Amox-Clav 875-125 mg Tablet] 1 tab PO BID 19 [

History]











Past Medical History


HEENT History: Reports: Allergic Rhinitis, Impaired Vision


Cardiovascular History: Reports: Cardiomyopathy, SOB on Exertion


Respiratory History: Reports: Bronchitis, Recurrent, COPD, Sleep Apnea


Gastrointestinal History: Reports: Bowel Obstruction, Diverticulosis, GERD, 

Hiatal Hernia


Genitourinary History: Reports: UTI, Recurrent, Other (See Below)


Other Genitourinary History: bladder suspension


OB/GYN History: Reports: Pregnancy, Spontaneous 


Musculoskeletal History: Reports: Arthritis, Back Pain, Chronic, Fibromyalgia


Other Musculoskeletal History: right knee pain


Neurological History: Reports: Vertigo


Psychiatric History: Reports: Anxiety, Depression, Suicidal Ideation


Endocrine/Metabolic History: Reports: Hypothyroidism, Obesity/BMI 30+


Hematologic History: Reports: None


Immunologic History: Reports: None


Oncologic (Cancer) History: Reports: Cervix, Uterine


Other Oncologic History: complete hysterectomy 2015


Dermatologic History: Reports: Cellulitis


Other Dermatologic History: sore on right lower leg that won't go away





- Infectious Disease History


Infectious Disease History: Reports: Helicobacter Pylori, Measles, Mumps





- Past Surgical History


HEENT Surgical History: Reports: Other (See Below)


Other HEENT Surgeries/Procedures: biopsy on tongue


GI Surgical History: Reports: Appendectomy, Colon, Colonoscopy, EGD, Hernia 

Repair/Other


Female  Surgical History: Reports: Hysterectomy, Salpingo-Oophorectomy


Musculoskeletal Surgical History: Reports: None





Social & Family History





- Family History


Family Medical History: Noncontributory





- Tobacco Use


Years of Tobacco use: 45


Packs/Tins Daily: 0.5





- Caffeine Use


Caffeine Use: Reports: Coffee, Soda





- Recreational Drug Use


Recreational Drug Use: No





- Living Situation & Occupation


Living situation: Reports:  (Lives with her 80+ mother, and her sister 

is in the next house. Has 1 son and 2 grandchildren , who she is currently 

estranged from her child and grandchildren.)





ED ROS GENERAL





- Review of Systems


Review Of Systems: See Below


Constitutional: Reports: Malaise.  Denies: Fever, Chills


Respiratory: Denies: Shortness of Breath


Cardiovascular: Denies: Chest Pain


GI/Abdominal: Denies: Nausea, Vomiting


Skin: Reports: Erythema (Significant increasing erythema around the lesion of 

the right leg)


Neurological: Denies: Paresthesia





ED EXAM, SKIN/RASH


Exam: See Below


Exam Limited By: No Limitations


General Appearance: Alert, No Apparent Distress


Respiratory/Chest: No Respiratory Distress


Extremities: Other (Exam is otherwise limited to the right leg.  Patient has a 

4 x 5 cm ulcerative lesion which is irregular on the anterior aspect of the 

right lower leg.  There is surrounding erythema and tenderness.  No significant 

discharge or drainage.)





Course





- Vital Signs


Last Recorded V/S: 


 Last Vital Signs











Temp  98.2 F   19 00:51


 


Pulse  75   19 00:51


 


Resp  16   19 00:51


 


BP  143/72 H  19 00:51


 


Pulse Ox  96   19 00:51














- Orders/Labs/Meds


Meds: 


Medications














Discontinued Medications














Generic Name Dose Route Start Last Admin





  Trade Name Guerda  PRN Reason Stop Dose Admin


 


Ampicillin Sodium/Sulbactam  50 mls @ 100 mls/hr  19 01:25  19 01:35





  Sodium 1.5 gm/ Sodium Chloride  IV  19 01:54  100 mls/hr





  ONETIME ONE   Administration





     





     





     





     


 


Ketorolac Tromethamine  30 mg  19 01:26  19 01:33





  Toradol  IVPUSH  19 01:27  30 mg





  ONETIME ONE   Administration





     





     





     





     














- Re-Assessments/Exams


Free Text/Narrative Re-Assessment/Exam: 





19 01:40


An IV was started and the patient was given 1.5 g of IV Unasyn, and 30 mg of IV 

Toradol.  She was discharged with 10 hydrocodone for extra pain control and 

encouraged to continue with the oral Augmentin and follow-up with Dr. Christine early next week if not improving satisfactorily.








Departure





- Departure


Time of Disposition: 02:17


Disposition: Home, Self-Care 01


Clinical Impression: 


Chronic ulcer of leg


Qualifiers:


 Laterality: right Non-pressure ulcer stage: with fat layer exposed Qualified 

Code(s): L97.912 - Non-pressure chronic ulcer of unspecified part of right 

lower leg with fat layer exposed








- Discharge Information


Instructions:  Wound Care, Adult


Referrals: 


Gabriel Schmidt MD [Primary Care Provider] - 


Forms:  ED Department Discharge


Care Plan Goals: 


Continue your antibiotics as prescribed, and use stronger pain medication as 

needed through the weekend.  Recheck early next week if not improving 

satisfactorily.  It may be helpful to elevate the leg.





Sepsis Event Note





- Evaluation


Sepsis Screening Result: No Definite Risk





- Focused Exam


Vital Signs: 


 Vital Signs











  Temp Pulse Resp BP Pulse Ox


 


 19 00:51  98.2 F  75  16  143/72 H  96


 


 19 00:40  98.2 F  75  16  143/72 H  96











Date Exam was Performed: 19


Time Exam was Performed: 07:00
No

## 2024-07-17 NOTE — PCM.HP
H&P History of Present Illness





- General


Date of Service: 18


Admit Problem/Dx: 


 Admission Diagnosis/Problem





Admission Diagnosis/Problem      Diverticulitis








Source of Information: Patient


History Limitations: Reports: No Limitations





- History of Present Illness


Initial Comments - Free Text/Narative: 





Abdominal pain; this is a 62-year-old female presents emergency room for 

concerns of abdominal pain, she reports on  started to experience 

abdominal pain felt chills but no fever. She felt a little better on Monday 

then today had worsening symptoms of abdominal pain and chills. Denies any 

vomiting, bloody diarrhea, chest pain, fever or chills. Does have chronic 

shortness of breath due to her severe emphysema and COPD.








Onset of Symptoms: Reports: Gradual


Duration of Symptoms: Reports: Day(s): (3 days)


Location: Reports: Abdomen


Quality: Reports: Same as Previous Episode


Severity: Moderate


Improves with: Reports: Medication


Worsens with: Reports: Eating


Context: Reports: Other (Past history of diverticular stenosis)


Associated Symptoms: Reports: Fever/Chills (Chills not fever), Loss of Appetite

, Shortness of Breath (Chronic)


  ** Abdomen


Pain Score (Numeric/FACES): 3





- Related Data


Allergies/Adverse Reactions: 


 Allergies











Allergy/AdvReac Type Severity Reaction Status Date / Time


 


ciprofloxacin [From Cipro] Allergy  Hives Verified 18 15:18


 


ciprofloxacin HCl Allergy  Hives Verified 18 15:18





[From Cipro]     


 


clonidine HCl [From Catapres] Allergy  Blisters Verified 18 15:18


 


hydromorphone Allergy  Itching Verified 18 15:18


 


oxycodone [From Percocet] Allergy  Hives Verified 18 15:18











Home Medications: 


 Home Meds





Levothyroxine Sodium [Synthroid] 25 mcg PO DAILY 01/29/15 [History]


Pantoprazole [ProTONIX Granules***] 40 mg PO BID 01/29/15 [History]


Albuterol Sulfate [Albuterol Sulfate HFA] 2 puff INH Q6H PRN 05/21/15 [History]


Ondansetron HCl [Zofran] 4 mg PO Q4H PRN 05/21/15 [History]


Loratadine 10 mg PO DAILY 16 [History]


buPROPion [Wellbutrin] 150 mg PO BID 09/10/16 [History]


Furosemide 40 mg PO DAILY PRN 17 [History]


Lidocaine 2% [Xylocaine 2% Jelly] 1 applic TOP ASDIRECTED 17 [History]


Triamcinolone Acetonide [Kenalog 0.1% Crm] 1 applic TOP TID 17 [History]


Albuterol/Ipratropium [DuoNeb 3.0-0.5 MG/3 ML] 3 ml IH Q4H PRN 07/10/17 [History

]


Magnesium Oxide [Magnesium] 250 mg PO ASDIRECTED 17 [History]


Sertraline HCl [Zoloft] 50 mg PO DAILY 17 [History]


Varenicline [Chantix] 1 tab PO BID 17 [History]


lamoTRIgine [Lamotrigine] 150 mg PO BEDTIME 17 [History]


traZODone 1 - 2 tab PO BEDTIME PRN 17 [History]


ClonazePAM [KlonoPIN] 0.5 mg PO DAILY 17 [History]


Penciclovir [Denavir 1% Crm] 1.5 gm TP ASDIRECTED 17 [History]


Mometasone Furoate [Elocon 0.1% Crm] 1 applic TOP DAILY 18 [History]


Sucralfate [Carafate] 1 tab PO QIDACANDBED 18 [History]


tiZANidine [Zanaflex] 4 mg PO Q8H PRN 18 [History]


Hydrocodone/Acetaminophen [Hydrocodon-Acetaminophen 5-325] 1 - 2 tab PO Q4H  [History]











Past Medical History


HEENT History: Reports: Allergic Rhinitis, Impaired Vision


Cardiovascular History: Reports: Cardiomyopathy, SOB on Exertion


Respiratory History: Reports: Bronchitis, Recurrent, COPD, Sleep Apnea


Gastrointestinal History: Reports: Bowel Obstruction, Diverticulosis, GERD, 

Hiatal Hernia


Genitourinary History: Reports: UTI, Recurrent, Other (See Below)


Other Genitourinary History: bladder suspension


OB/GYN History: Reports: Pregnancy, Spontaneous 


Musculoskeletal History: Reports: Arthritis, Back Pain, Chronic, Fibromyalgia


Neurological History: Reports: Vertigo


Psychiatric History: Reports: Anxiety, Depression


Endocrine/Metabolic History: Reports: Hypothyroidism, Obesity/BMI 30+


Immunologic History: Reports: None


Oncologic (Cancer) History: Reports: Uterine


Other Oncologic History: complete hysterectomy 2015


Dermatologic History: Reports: Cellulitis


Other Dermatologic History: sore on right lower leg that won't go away





- Infectious Disease History


Infectious Disease History: Reports: Chicken Pox, Measles





- Past Surgical History


HEENT Surgical History: Reports: Other (See Below)


Other HEENT Surgeries/Procedures: biopsy on tongue


GI Surgical History: Reports: Appendectomy, Colon, Colonoscopy, EGD, Hernia 

Repair/Other


Female  Surgical History: Reports: Hysterectomy, Salpingo-Oophorectomy





Social & Family History





- Family History


Family Medical History: Noncontributory





- Tobacco Use


Smoking Status *Q: Light Tobacco Smoker


Years of Tobacco use: 45


Packs/Tins Daily: 0.3





- Caffeine Use


Caffeine Use: Reports: Coffee, Soda





- Recreational Drug Use


Recreational Drug Use: No





- Living Situation & Occupation


Living situation: Reports:  (Lives with her 80+ mother, and her sister 

is in the next house. Has 1 son and 2 grandchildren , who she is currently 

estranged from her child and grandchildren.)





H&P Review of Systems





- Review of Systems:


Review Of Systems: See Below


General: Reports: Chills, Fatigue, Decreased Appetite


HEENT: Reports: No Symptoms


Pulmonary: Reports: Shortness of Breath, Cough, Other (Severe emphysema with 

COPD)


Cardiovascular: Reports: No Symptoms


Gastrointestinal: Reports: Abdominal Pain


Genitourinary: Reports: No Symptoms


Musculoskeletal: Reports: No Symptoms


Skin: Reports: No Symptoms


Psychiatric: Reports: Depression, Other (Depression related to family situation

, anxiety, personality disorder.)


Neurological: Reports: No Symptoms


Hematologic/Lymphatic: Reports: No Symptoms


Immunologic: Reports: No Symptoms





Exam





- Exam


Exam: See Below





- Vital Signs


Vital Signs: 


 Last Vital Signs











Temp  37.1 C   18 15:17


 


Pulse  73   18 18:13


 


Resp  16   18 18:13


 


BP  105/55 L  18 18:13


 


Pulse Ox  93 L  18 18:13











Weight: 89.811 kg





- Exam


Quality Assessment: Supplemental Oxygen, DVT Prophylaxis


General: Alert, Oriented, Cooperative, Mild Distress


HEENT: PERRLA, Conjunctiva Clear, EACs Clear, EOMI, Hearing Intact, Mucosa 

Moist & Pink, Nares Patent, Normal Nasal Septum, Posterior Pharynx Clear, 

Pupils Equal, Pupils Reactive, TMs Clear, Glasses


Neck: Supple, Trachea Midline, 2


Lungs: Decreased Breath Sounds, Wheezing


Cardiovascular: Regular Rate, Regular Rhythm, Normal S1, Normal S2


GI/Abdominal Exam: Soft, Tender (Generalized tenderness noted), Abnormal Bowel 

Sounds (Hypoactive bowel sounds noted)


 (Female) Exam: Deferred


Rectal (Female) Exam: Deferred


Back Exam: Normal Inspection, Full Range of Motion, NT


Extremities: Normal Inspection, Normal Range of Motion, Non-Tender, No Pedal 

Edema, Normal Capillary Refill


Peripheral Pulses: 2+: Radial (L), Radial (R), Posterior Tibial (L), Posterior 

Tibial (R)


Skin: Warm, Dry, Intact


Neurological: Strength Equal Bilateral, Normal Speech, Normal Tone


Neuro Extensive - Mental Status: Alert, Oriented x3, Normal Mood/Affect, Other (

Patient became tearful when discussing her family situation.)


Psychiatric: Alert, Normal Affect, Normal Mood





- Patient Data


Lab Results Last 24 hrs: 


 Laboratory Results - last 24 hr











  18 Range/Units





  15:11 15:11 15:11 


 


WBC   9.9   (4.5-11.0)  K/uL


 


RBC   4.44   (3.30-5.50)  M/uL


 


Hgb   13.1   (12.0-15.0)  g/dL


 


Hct   40.5   (36.0-48.0)  %


 


MCV   91   (80-98)  fL


 


MCH   30   (27-31)  pg


 


MCHC   32   (32-36)  %


 


Plt Count   253   (150-400)  K/uL


 


Neut % (Auto)   75 H   (36-66)  %


 


Lymph % (Auto)   18 L   (24-44)  %


 


Mono % (Auto)   6   (2-6)  %


 


Eos % (Auto)   1 L   (2-4)  %


 


Baso % (Auto)   0   (0-1)  %


 


Sodium    143  (140-148)  mmol/L


 


Potassium    3.8  (3.6-5.2)  mmol/L


 


Chloride    104  (100-108)  mmol/L


 


Carbon Dioxide    31  (21-32)  mmol/L


 


Anion Gap    8.3  (5.0-14.0)  mmol/L


 


BUN    14  (7-18)  mg/dL


 


Creatinine    1.0  D  (0.6-1.0)  mg/dL


 


Est Cr Clr Drug Dosing    44.58  mL/min


 


Estimated GFR (MDRD)    56 L  (>60)  


 


Glucose    101  ()  mg/dL


 


Calcium    8.9  (8.5-10.1)  mg/dL


 


Total Bilirubin    0.4  (0.2-1.0)  mg/dL


 


AST    17  (15-37)  U/L


 


ALT    22  (12-78)  U/L


 


Alkaline Phosphatase    98  ()  U/L


 


Total Protein    7.2  (6.4-8.2)  g/dL


 


Albumin    3.3 L  (3.4-5.0)  g/dL


 


Globulin    3.9 H  (2.3-3.5)  g/dL


 


Albumin/Globulin Ratio    0.9 L  (1.2-2.2)  


 


Amylase    52  ()  U/L


 


Lipase    71 L  ()  U/L


 


Urine Color  Yellow    


 


Urine Appearance  Clear    


 


Urine pH  7.0    (4.5-8.0)  


 


Ur Specific Gravity  1.010    (1.008-1.030)  


 


Urine Protein  Negative    (NEGATIVE)  mg/dL


 


Urine Glucose (UA)  Normal    (NEGATIVE)  mg/dL


 


Urine Ketones  Negative    (NEGATIVE)  mg/dL


 


Urine Occult Blood  Negative    (NEGATIVE)  


 


Urine Nitrite  Negative    (NEGATIVE)  


 


Urine Bilirubin  Negative    (NEGATIVE)  


 


Urine Urobilinogen  Normal    (NORMAL)  mg/dL


 


Ur Leukocyte Esterase  Negative    (NEGATIVE)  


 


Urine RBC  0-5    (0-5)  


 


Urine WBC  0-5    (0-5)  


 


Ur Epithelial Cells  Rare    


 


Amorphous Sediment  Not seen    


 


Urine Bacteria  Moderate    


 


Urine Mucus  Not seen    











Result Diagrams: 


 18 15:11





 18 15:11





- Problem List


(1) COPD (chronic obstructive pulmonary disease) with emphysema


SNOMED Code(s): 43373523


   ICD Code: J43.9 - EMPHYSEMA, UNSPECIFIED   Status: Acute   Current Visit: 

Yes   





(2) Diverticulitis


SNOMED Code(s): 372549182


   ICD Code: K57.92 - DVTRCLI OF INTEST, PART UNSP, W/O PERF OR ABSCESS W/O 

BLEED   Status: Acute   Priority: High   Current Visit: Yes   





(3) COLD, Chronic obstructive lung disease


SNOMED Code(s): 73237996


   ICD Code: J44.9 - CHRONIC OBSTRUCTIVE PULMONARY DISEASE, UNSPECIFIED   Status

: Chronic   Priority: Low   Current Visit: No   


Problem List Initiated/Reviewed/Updated: Yes


Orders Last 24hrs: 


 Active Orders 24 hr











 Category Date Time Status


 


 Patient Status Manage Transfer [TRANSFER] Routine ADT  18 20:02 Active


 


 Peripheral IV Care [RC] .AS DIRECTED Care  18 15:12 Active


 


 Abdomen 2V AP Flat Upright [CR] Stat Exams  18 15:59 Taken


 


 Abdomen Pelvis w Cont [CT] Stat Exams  18 17:11 Taken


 


 UA W/MICROSCOPIC [URIN] Stat Lab  18 15:11 Ordered


 


 Iopamidol [Isovue-300 (61%)] Med  18 17:30 Active





 135 ml IV .AS DIRECTED   


 


 Sodium Chloride 0.9% [Normal Saline] 1,000 ml Med  18 15:30 Active





 IV ASDIRECTED   


 


 Sodium Chloride 0.9% [Normal Saline] 1,000 ml Med  18 19:15 Active





 IV ASDIRECTED   


 


 Sodium Chloride 0.9% [Normal Saline] 80 ml Med  18 17:30 Active





 IV ASDIRECTED   


 


 Sodium Chloride 0.9% [Saline Flush] Med  18 15:12 Active





 10 ml FLUSH ASDIRECTED PRN   


 


 Peripheral IV Insertion Adult [OM.PC] Stat Oth  18 15:12 Ordered


 


 Resuscitation Status Routine Resus Stat  18 20:05 Ordered








 Medication Orders





Sodium Chloride (Normal Saline)  1,000 mls @ 75 mls/hr IV ASDIRECTED [facility]


   Last Admin: 18 15:57  Dose: 75 mls/hr


Sodium Chloride (Normal Saline)  80 mls @ 3 mls/sec IV ASDIRECTED [facility]


   Stop: 18 23:00


   Last Admin: 18 17:44  Dose: 3 mls/sec


Sodium Chloride (Normal Saline)  1,000 mls @ 75 mls/hr IV ASDIRECTED [facility]


   Last Admin: 18 21:00  Dose: 75 mls/hr


Iopamidol (Isovue-300 (61%))  135 ml IV .AS DIRECTED [facility]


   Stop: 18 22:00


   Last Admin: 18 17:44  Dose: 135 ml


   Admin: 18 17:43  Dose: 150 ml


Sodium Chloride (Saline Flush)  10 ml FLUSH ASDIRECTED PRN


   PRN Reason: Keep Vein Open


   Last Admin: 18 15:57  Dose: 10 ml








Assessment/Plan Comment:: 








ASSESSMENT / PLAN


-Abdominal pain; this is a 62-year-old female presents emergency room for 

concerns of abdominal pain, she reports on  started to experience 

abdominal pain felt chills but no fever. She felt a little better on Monday 

then today had worsening symptoms of abdominal pain and chills. Denies any 

vomiting, bloody diarrhea, chest pain, fever or chills. Does have chronic 

shortness of breath due to her severe emphysema and COPD. 





Labs: White count 9.9 hemoglobin 13.1 differential normal, sodium 143 potassium 

3.8 chloride 104 ,bun 14 creatinine 1.0, chloride 101 CO2 31 GFR greater than 56

,  UA negative. CT scan abdomen pelvis, results colonic diverticulosis with 

moderate acute diverticulitis of the mid sigmoid colon.





Colonic diverticulosis with moderate acute diverticulitis of the mid sigmoid 

colon


-Admit to [address] [place] [address] for further monitoring


-IV Fluids for rehydration NS at 125 mL per hour


-IV Antibiotic: Piperacillin tazobact 3.375mg  IV every 6 hours


-IV Antibiotic: Metronidazole 500 gm IV every 8 hours


-IV Solu-Medrol 125 mg, then 62.5 mg IV every 6 hours


-surgical consult Dr. August, will see in am


-And a.m. labs: CBC, BMP





COPD


 -albuterol nebulizer every 4 hours as needed for wheezing and cough


-Duo nebu ; schedule  nebulize every 6 hours     


-Nicotine patch 14mg daily  


-oxygen per nasal cannula to keep sats greater than 92%              


-Advise to notify nurses of any chest pain or other symptoms 





Maintenance issues


-Orders home meds:  ordered discharge 


-Nutrition: NPO diet


-Pablo catheter not indicated at this time


-DVT: SCD


-PPI: IV Protonix 40mg daily





CODE STATUS: FULL CODE





Admission status: Admit to [address] [place] [address]


Admission justification.  This patient will be admitted for inpatient services 

and is medically appropriate meeting medical necessity for inpatient admission 

as outlined in my documentation. I reasonably expect the patient will require 

inpatient services that span.  Time over 2 midnights.  I reasonably expect this 

patient to be discharged or transferred within 96 hours after admission to the 

critical access hospital.





Disposition; home 





Primary care provider: Dr. Brayan MD





Hospitalist: Dr. Nelson





Surgeon: Dr. Arian August
0 = swallows foods/liquids without difficulty

## 2024-08-20 ENCOUNTER — HOSPITAL ENCOUNTER (OUTPATIENT)
Dept: HOSPITAL 11 - JP.SDS | Age: 69
Discharge: HOME | End: 2024-08-20
Attending: SURGERY
Payer: MEDICARE

## 2024-08-20 VITALS — SYSTOLIC BLOOD PRESSURE: 146 MMHG | DIASTOLIC BLOOD PRESSURE: 65 MMHG | HEART RATE: 55 BPM

## 2024-08-20 DIAGNOSIS — K21.9: ICD-10-CM

## 2024-08-20 DIAGNOSIS — R13.10: Primary | ICD-10-CM

## 2024-08-20 DIAGNOSIS — J44.9: ICD-10-CM

## 2024-08-20 DIAGNOSIS — K44.9: ICD-10-CM

## 2024-08-20 DIAGNOSIS — E03.9: ICD-10-CM

## 2024-08-20 PROCEDURE — 00731 ANES UPR GI NDSC PX NOS: CPT

## 2024-08-20 PROCEDURE — 88305 TISSUE EXAM BY PATHOLOGIST: CPT

## 2024-08-20 PROCEDURE — 43249 ESOPH EGD DILATION <30 MM: CPT

## 2024-08-20 PROCEDURE — C1726 CATH, BAL DIL, NON-VASCULAR: HCPCS

## 2024-08-20 PROCEDURE — 43239 EGD BIOPSY SINGLE/MULTIPLE: CPT

## 2024-11-13 ENCOUNTER — HOSPITAL ENCOUNTER (EMERGENCY)
Dept: HOSPITAL 11 - JP.ED | Age: 69
Discharge: HOME | End: 2024-11-13
Payer: MEDICARE

## 2024-11-13 VITALS — SYSTOLIC BLOOD PRESSURE: 146 MMHG | DIASTOLIC BLOOD PRESSURE: 87 MMHG | HEART RATE: 82 BPM

## 2024-11-13 DIAGNOSIS — Z88.8: ICD-10-CM

## 2024-11-13 DIAGNOSIS — J44.9: ICD-10-CM

## 2024-11-13 DIAGNOSIS — Z95.0: ICD-10-CM

## 2024-11-13 DIAGNOSIS — Z88.6: ICD-10-CM

## 2024-11-13 DIAGNOSIS — Z90.710: ICD-10-CM

## 2024-11-13 DIAGNOSIS — K21.9: ICD-10-CM

## 2024-11-13 DIAGNOSIS — Z88.5: ICD-10-CM

## 2024-11-13 DIAGNOSIS — T18.9XXA: Primary | ICD-10-CM

## 2024-11-13 DIAGNOSIS — Z90.49: ICD-10-CM

## 2024-11-13 DIAGNOSIS — Z79.899: ICD-10-CM

## 2024-11-13 DIAGNOSIS — I10: ICD-10-CM

## 2024-11-13 DIAGNOSIS — Z88.1: ICD-10-CM

## 2024-11-13 DIAGNOSIS — E11.9: ICD-10-CM
